# Patient Record
Sex: MALE | Race: OTHER | NOT HISPANIC OR LATINO | URBAN - METROPOLITAN AREA
[De-identification: names, ages, dates, MRNs, and addresses within clinical notes are randomized per-mention and may not be internally consistent; named-entity substitution may affect disease eponyms.]

---

## 2017-04-29 ENCOUNTER — EMERGENCY (EMERGENCY)
Facility: HOSPITAL | Age: 37
LOS: 1 days | Discharge: HOME | End: 2017-04-29
Attending: EMERGENCY MEDICINE | Admitting: EMERGENCY MEDICINE
Payer: MEDICAID

## 2017-04-29 DIAGNOSIS — R53.1 WEAKNESS: ICD-10-CM

## 2017-04-29 PROCEDURE — 93010 ELECTROCARDIOGRAM REPORT: CPT

## 2017-04-29 PROCEDURE — 99284 EMERGENCY DEPT VISIT MOD MDM: CPT | Mod: 25

## 2017-04-29 NOTE — ED ADULT NURSE NOTE - OBJECTIVE STATEMENT
Presents c/o "not feeling like myself", weakness and insomnia x3 months. Was seen by his PMD 2 weeks and told that he has a hyperactive thyroid. Was referred to psychiatry and started on Wellbutrin and Xanax. Pt was to follow with PMD in 2 weeks, but states that he had worsening fatigue recently. Pt A&Ox3. BUENO. Ambulates. Denies SI/HI. Denies CP/SOB. Respirations even/unlabored. Abd soft. Skin WDI.

## 2017-04-30 VITALS
RESPIRATION RATE: 18 BRPM | HEART RATE: 62 BPM | DIASTOLIC BLOOD PRESSURE: 85 MMHG | SYSTOLIC BLOOD PRESSURE: 130 MMHG | OXYGEN SATURATION: 96 %

## 2017-04-30 VITALS
OXYGEN SATURATION: 99 % | DIASTOLIC BLOOD PRESSURE: 84 MMHG | RESPIRATION RATE: 18 BRPM | HEART RATE: 62 BPM | TEMPERATURE: 98 F | SYSTOLIC BLOOD PRESSURE: 127 MMHG

## 2017-04-30 LAB
ALBUMIN SERPL ELPH-MCNC: 4.6 G/DL — SIGNIFICANT CHANGE UP (ref 3.3–5)
ALP SERPL-CCNC: 55 U/L — SIGNIFICANT CHANGE UP (ref 40–120)
ALT FLD-CCNC: 131 U/L RC — HIGH (ref 10–45)
ANION GAP SERPL CALC-SCNC: 14 MMOL/L — SIGNIFICANT CHANGE UP (ref 5–17)
AST SERPL-CCNC: 33 U/L — SIGNIFICANT CHANGE UP (ref 10–40)
BASOPHILS # BLD AUTO: 0.1 K/UL — SIGNIFICANT CHANGE UP (ref 0–0.2)
BASOPHILS NFR BLD AUTO: 0.5 % — SIGNIFICANT CHANGE UP (ref 0–2)
BILIRUB SERPL-MCNC: 0.3 MG/DL — SIGNIFICANT CHANGE UP (ref 0.2–1.2)
BUN SERPL-MCNC: 14 MG/DL — SIGNIFICANT CHANGE UP (ref 7–23)
CALCIUM SERPL-MCNC: 9.4 MG/DL — SIGNIFICANT CHANGE UP (ref 8.4–10.5)
CHLORIDE SERPL-SCNC: 103 MMOL/L — SIGNIFICANT CHANGE UP (ref 96–108)
CO2 SERPL-SCNC: 25 MMOL/L — SIGNIFICANT CHANGE UP (ref 22–31)
CREAT SERPL-MCNC: 0.92 MG/DL — SIGNIFICANT CHANGE UP (ref 0.5–1.3)
EOSINOPHIL # BLD AUTO: 0.1 K/UL — SIGNIFICANT CHANGE UP (ref 0–0.5)
EOSINOPHIL NFR BLD AUTO: 1 % — SIGNIFICANT CHANGE UP (ref 0–6)
GLUCOSE SERPL-MCNC: 93 MG/DL — SIGNIFICANT CHANGE UP (ref 70–99)
HCT VFR BLD CALC: 44.4 % — SIGNIFICANT CHANGE UP (ref 39–50)
HGB BLD-MCNC: 15.4 G/DL — SIGNIFICANT CHANGE UP (ref 13–17)
LYMPHOCYTES # BLD AUTO: 2.8 K/UL — SIGNIFICANT CHANGE UP (ref 1–3.3)
LYMPHOCYTES # BLD AUTO: 24.3 % — SIGNIFICANT CHANGE UP (ref 13–44)
MCHC RBC-ENTMCNC: 30.1 PG — SIGNIFICANT CHANGE UP (ref 27–34)
MCHC RBC-ENTMCNC: 34.6 GM/DL — SIGNIFICANT CHANGE UP (ref 32–36)
MCV RBC AUTO: 86.9 FL — SIGNIFICANT CHANGE UP (ref 80–100)
MONOCYTES # BLD AUTO: 0.9 K/UL — SIGNIFICANT CHANGE UP (ref 0–0.9)
MONOCYTES NFR BLD AUTO: 7.7 % — SIGNIFICANT CHANGE UP (ref 2–14)
NEUTROPHILS # BLD AUTO: 7.6 K/UL — HIGH (ref 1.8–7.4)
NEUTROPHILS NFR BLD AUTO: 66.5 % — SIGNIFICANT CHANGE UP (ref 43–77)
PLATELET # BLD AUTO: 285 K/UL — SIGNIFICANT CHANGE UP (ref 150–400)
POTASSIUM SERPL-MCNC: 3.6 MMOL/L — SIGNIFICANT CHANGE UP (ref 3.5–5.3)
POTASSIUM SERPL-SCNC: 3.6 MMOL/L — SIGNIFICANT CHANGE UP (ref 3.5–5.3)
PROT SERPL-MCNC: 7.3 G/DL — SIGNIFICANT CHANGE UP (ref 6–8.3)
RBC # BLD: 5.11 M/UL — SIGNIFICANT CHANGE UP (ref 4.2–5.8)
RBC # FLD: 11.8 % — SIGNIFICANT CHANGE UP (ref 10.3–14.5)
SODIUM SERPL-SCNC: 142 MMOL/L — SIGNIFICANT CHANGE UP (ref 135–145)
TSH SERPL-MCNC: 0.72 UIU/ML — SIGNIFICANT CHANGE UP (ref 0.27–4.2)
WBC # BLD: 11.4 K/UL — HIGH (ref 3.8–10.5)
WBC # FLD AUTO: 11.4 K/UL — HIGH (ref 3.8–10.5)

## 2017-04-30 PROCEDURE — 93005 ELECTROCARDIOGRAM TRACING: CPT

## 2017-04-30 PROCEDURE — 84443 ASSAY THYROID STIM HORMONE: CPT

## 2017-04-30 PROCEDURE — 80053 COMPREHEN METABOLIC PANEL: CPT

## 2017-04-30 PROCEDURE — 99284 EMERGENCY DEPT VISIT MOD MDM: CPT | Mod: 25

## 2017-04-30 PROCEDURE — 85027 COMPLETE CBC AUTOMATED: CPT

## 2017-04-30 RX ORDER — SODIUM CHLORIDE 9 MG/ML
1000 INJECTION, SOLUTION INTRAVENOUS ONCE
Qty: 0 | Refills: 0 | Status: COMPLETED | OUTPATIENT
Start: 2017-04-30 | End: 2017-04-30

## 2017-04-30 RX ORDER — ALPRAZOLAM 0.25 MG
0.5 TABLET ORAL ONCE
Qty: 0 | Refills: 0 | Status: DISCONTINUED | OUTPATIENT
Start: 2017-04-30 | End: 2017-04-30

## 2017-04-30 RX ADMIN — Medication 0.5 MILLIGRAM(S): at 01:41

## 2017-04-30 RX ADMIN — SODIUM CHLORIDE 1000 MILLILITER(S): 9 INJECTION, SOLUTION INTRAVENOUS at 00:58

## 2017-04-30 NOTE — ED PROVIDER NOTE - OBJECTIVE STATEMENT
38 yo [place] M PMHx Anxiety (Wellbutrin & Xanax) and recently diagnosed "borderline hyperthyroidism" presents with 3 month history of generalized fatigue and weakness. Per patient, he is a  and normally can work 50-60hrs/week with no issue, until 3 months ago when he has been unable to work for 5 hrs at a time. Endorses +bilateral LE weakness +Dizziness, +15lbs weight loss +perspirations. Only sleeps roughly 2-3 hours a night. Patient saw PCP 2-3 weeks ago and received routine blood work and evaluation and was told he is borderline hyperthyroid, but not prescribed any medication and told to continue to take his Wellbutrin/Xanax and improved sleep hygiene.   Non-smoker, no illicit drug use, has abstained from EtOH for 3 weeks but had drank 2-3 beers/d prior

## 2017-04-30 NOTE — ED PROVIDER NOTE - CARE PLAN
Principal Discharge DX:	Fatigue Principal Discharge DX:	Fatigue  Goal:	Resolution  Instructions for follow-up, activity and diet:	Please follow-up with the medicine clinic within 24-48 hrs ([phone])  Please use Melatonin to help sleep, refrain from using alcohol, drink coffee/stimulants sparingly, do not eat, drink or watch TV prior to bed, use your bed for sleep and sex only.  Please return to the ED for new/worsening symptoms Principal Discharge DX:	Fatigue  Goal:	Resolution  Instructions for follow-up, activity and diet:	Please follow-up with the medicine clinic within 24-48 hrs ([phone])  Please use Melatonin to help sleep, refrain from using alcohol, drink coffee/stimulants sparingly, do not eat, drink or watch TV prior to bed, use your bed for sleep and sex only.  Please return to the ED for new/worsening symptoms

## 2017-04-30 NOTE — ED PROVIDER NOTE - PLAN OF CARE
Resolution Please follow-up with the medicine clinic within 24-48 hrs ([phone])  Please use Melatonin to help sleep, refrain from using alcohol, drink coffee/stimulants sparingly, do not eat, drink or watch TV prior to bed, use your bed for sleep and sex only.  Please return to the ED for new/worsening symptoms

## 2017-04-30 NOTE — ED PROVIDER NOTE - CONSTITUTIONAL, MLM
normal... Tired appearing, well nourished, awake, alert, oriented to person, place, time/situation and in no apparent distress.

## 2017-04-30 NOTE — ED PROVIDER NOTE - ATTENDING CONTRIBUTION TO CARE
patient presenting with generalized fatigue. poor sleep hygiene. 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 HD stable. EKG NSR. no fever or chills. will check blood work and out patient f/u.

## 2017-11-05 ENCOUNTER — EMERGENCY (EMERGENCY)
Facility: HOSPITAL | Age: 37
LOS: 1 days | Discharge: HOME | End: 2017-11-05
Attending: EMERGENCY MEDICINE | Admitting: EMERGENCY MEDICINE
Payer: COMMERCIAL

## 2017-11-05 VITALS — OXYGEN SATURATION: 95 % | HEART RATE: 90 BPM | RESPIRATION RATE: 19 BRPM | TEMPERATURE: 101 F

## 2017-11-05 VITALS
RESPIRATION RATE: 18 BRPM | OXYGEN SATURATION: 90 % | TEMPERATURE: 100 F | HEART RATE: 103 BPM | SYSTOLIC BLOOD PRESSURE: 143 MMHG | DIASTOLIC BLOOD PRESSURE: 83 MMHG

## 2017-11-05 LAB
ALBUMIN SERPL ELPH-MCNC: 4.3 G/DL — SIGNIFICANT CHANGE UP (ref 3.3–5)
ALP SERPL-CCNC: 55 U/L — SIGNIFICANT CHANGE UP (ref 40–120)
ALT FLD-CCNC: 100 U/L RC — HIGH (ref 10–45)
ANION GAP SERPL CALC-SCNC: 15 MMOL/L — SIGNIFICANT CHANGE UP (ref 5–17)
AST SERPL-CCNC: 101 U/L — HIGH (ref 10–40)
BILIRUB SERPL-MCNC: 0.8 MG/DL — SIGNIFICANT CHANGE UP (ref 0.2–1.2)
BUN SERPL-MCNC: 23 MG/DL — SIGNIFICANT CHANGE UP (ref 7–23)
CALCIUM SERPL-MCNC: 8.7 MG/DL — SIGNIFICANT CHANGE UP (ref 8.4–10.5)
CHLORIDE SERPL-SCNC: 101 MMOL/L — SIGNIFICANT CHANGE UP (ref 96–108)
CO2 SERPL-SCNC: 22 MMOL/L — SIGNIFICANT CHANGE UP (ref 22–31)
CREAT SERPL-MCNC: 1.02 MG/DL — SIGNIFICANT CHANGE UP (ref 0.5–1.3)
EOSINOPHIL # BLD AUTO: 0.1 K/UL — SIGNIFICANT CHANGE UP (ref 0–0.5)
EOSINOPHIL NFR BLD AUTO: 1 % — SIGNIFICANT CHANGE UP (ref 0–6)
GAS PNL BLDV: SIGNIFICANT CHANGE UP
GLUCOSE SERPL-MCNC: 147 MG/DL — HIGH (ref 70–99)
HCT VFR BLD CALC: 40.5 % — SIGNIFICANT CHANGE UP (ref 39–50)
HGB BLD-MCNC: 13.8 G/DL — SIGNIFICANT CHANGE UP (ref 13–17)
LYMPHOCYTES # BLD AUTO: 0.9 K/UL — LOW (ref 1–3.3)
LYMPHOCYTES # BLD AUTO: 6 % — LOW (ref 13–44)
MCHC RBC-ENTMCNC: 30.3 PG — SIGNIFICANT CHANGE UP (ref 27–34)
MCHC RBC-ENTMCNC: 34.1 GM/DL — SIGNIFICANT CHANGE UP (ref 32–36)
MCV RBC AUTO: 88.8 FL — SIGNIFICANT CHANGE UP (ref 80–100)
MONOCYTES # BLD AUTO: 1 K/UL — HIGH (ref 0–0.9)
MONOCYTES NFR BLD AUTO: 10 % — SIGNIFICANT CHANGE UP (ref 2–14)
NEUTROPHILS # BLD AUTO: 11.7 K/UL — HIGH (ref 1.8–7.4)
NEUTROPHILS NFR BLD AUTO: 72 % — SIGNIFICANT CHANGE UP (ref 43–77)
PLATELET # BLD AUTO: 238 K/UL — SIGNIFICANT CHANGE UP (ref 150–400)
POTASSIUM SERPL-MCNC: 3.7 MMOL/L — SIGNIFICANT CHANGE UP (ref 3.5–5.3)
POTASSIUM SERPL-SCNC: 3.7 MMOL/L — SIGNIFICANT CHANGE UP (ref 3.5–5.3)
PROT SERPL-MCNC: 6.6 G/DL — SIGNIFICANT CHANGE UP (ref 6–8.3)
RBC # BLD: 4.56 M/UL — SIGNIFICANT CHANGE UP (ref 4.2–5.8)
RBC # FLD: 11.5 % — SIGNIFICANT CHANGE UP (ref 10.3–14.5)
SODIUM SERPL-SCNC: 138 MMOL/L — SIGNIFICANT CHANGE UP (ref 135–145)
WBC # BLD: 13.7 K/UL — HIGH (ref 3.8–10.5)
WBC # FLD AUTO: 13.7 K/UL — HIGH (ref 3.8–10.5)

## 2017-11-05 PROCEDURE — 84132 ASSAY OF SERUM POTASSIUM: CPT

## 2017-11-05 PROCEDURE — 96374 THER/PROPH/DIAG INJ IV PUSH: CPT

## 2017-11-05 PROCEDURE — 99284 EMERGENCY DEPT VISIT MOD MDM: CPT | Mod: 25

## 2017-11-05 PROCEDURE — 85014 HEMATOCRIT: CPT

## 2017-11-05 PROCEDURE — 84295 ASSAY OF SERUM SODIUM: CPT

## 2017-11-05 PROCEDURE — 80053 COMPREHEN METABOLIC PANEL: CPT

## 2017-11-05 PROCEDURE — 99285 EMERGENCY DEPT VISIT HI MDM: CPT | Mod: 25

## 2017-11-05 PROCEDURE — 93005 ELECTROCARDIOGRAM TRACING: CPT

## 2017-11-05 PROCEDURE — 76705 ECHO EXAM OF ABDOMEN: CPT

## 2017-11-05 PROCEDURE — 71046 X-RAY EXAM CHEST 2 VIEWS: CPT

## 2017-11-05 PROCEDURE — 82947 ASSAY GLUCOSE BLOOD QUANT: CPT

## 2017-11-05 PROCEDURE — 82330 ASSAY OF CALCIUM: CPT

## 2017-11-05 PROCEDURE — 76705 ECHO EXAM OF ABDOMEN: CPT | Mod: 26,RT

## 2017-11-05 PROCEDURE — 82803 BLOOD GASES ANY COMBINATION: CPT

## 2017-11-05 PROCEDURE — 82435 ASSAY OF BLOOD CHLORIDE: CPT

## 2017-11-05 PROCEDURE — 85027 COMPLETE CBC AUTOMATED: CPT

## 2017-11-05 PROCEDURE — 71020: CPT | Mod: 26

## 2017-11-05 PROCEDURE — 93010 ELECTROCARDIOGRAM REPORT: CPT

## 2017-11-05 PROCEDURE — 83605 ASSAY OF LACTIC ACID: CPT

## 2017-11-05 RX ORDER — KETOROLAC TROMETHAMINE 30 MG/ML
15 SYRINGE (ML) INJECTION ONCE
Qty: 0 | Refills: 0 | Status: DISCONTINUED | OUTPATIENT
Start: 2017-11-05 | End: 2017-11-05

## 2017-11-05 RX ORDER — SODIUM CHLORIDE 9 MG/ML
2000 INJECTION, SOLUTION INTRAVENOUS ONCE
Qty: 0 | Refills: 0 | Status: COMPLETED | OUTPATIENT
Start: 2017-11-05 | End: 2017-11-05

## 2017-11-05 RX ADMIN — Medication 15 MILLIGRAM(S): at 17:11

## 2017-11-05 RX ADMIN — Medication 15 MILLIGRAM(S): at 16:23

## 2017-11-05 RX ADMIN — SODIUM CHLORIDE 2000 MILLILITER(S): 9 INJECTION, SOLUTION INTRAVENOUS at 16:23

## 2017-11-05 NOTE — ED PROVIDER NOTE - PROGRESS NOTE DETAILS
Pt has pna, is hypoxic offered admission, pt now saturating 94% on RA.  The patient is leaving against medical advice. The patient has the capacity to refuse further medical evaluation and care. I had a lengthy discussion with the patient in which appropriate further evaluation and treatment was offered to the patient, and they declined. The patient understands that as a consequence of this decision they may be at risk for clinical deterioration including permanent disability and death and verbalized this understanding. Clear return precautions were discussed. The patient was urged to seek follow-up care, with appropriate referrals made as needed.  Pt refused admission.

## 2017-11-05 NOTE — ED PROVIDER NOTE - OBJECTIVE STATEMENT
37 YOM presents to ed with fever, weakness, cough x 2 days. Pt had near syncope x 2 times today while laying in bed. +productive cough with brown phlegm. Pt denies any abd pain +nausea no vomiting. Pt has diffuse body aches. 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

## 2017-11-05 NOTE — ED PROVIDER NOTE - NS ED ROS FT
CONSTITUTIONAL: + fevers, no chills  Eyes: no visual changes  Ears: no ear drainage, no ear pain  Nose: no nasal congestion  Mouth/Throat: no sore throat  Cardiovascular: No Chest pain  Respiratory: + SOB +Cough  Gastrointestinal: No n/v/d, no abd pain  Genitourinary: no dysuria, no hematuria  SKIN: no rashes.  NEURO: +headache   PSYCHIATRIC: no known mental health issues.

## 2017-11-05 NOTE — ED ADULT NURSE NOTE - CHIEF COMPLAINT QUOTE
fever/cough x "few days"; sent from urgent care; O2 sat in triage 90%, pt placed on 3L NC O2 sat increased to 95%

## 2017-11-05 NOTE — ED ADULT NURSE REASSESSMENT NOTE - NS ED NURSE REASSESS COMMENT FT1
patient did not want to stay. risks of going home against medical advice were explained by md. patient declined to stay. importance of follow up with primary md was explained. patient states he would make an appointment tomorrow with pmd.

## 2017-11-05 NOTE — ED PROVIDER NOTE - MEDICAL DECISION MAKING DETAILS
Rhonchi, fever, weakness, right upper quadrant abdominal pain. Will get CXR, right upper quadrant US, labs, reassess likely admit.

## 2017-11-05 NOTE — ED PROVIDER NOTE - ATTENDING CONTRIBUTION TO CARE
36 yo male, fever, cough, PNA on CXR (community-acquired).  Extensive conversation with patient and wife re: I believe he should stay for IV antibiotics given mild hypoxia and SOB at home.  Patient adamantly refuses, states he has been in the hospital before and there were "complications", will not go into detail, but states he will unequivocally not stay.  See AMA form.  Says he will call PCP tomorrow.  Will send with rx for abx.

## 2017-11-05 NOTE — ED ADULT NURSE NOTE - OBJECTIVE STATEMENT
37 y m came to the ed from urgent care with facial redness and a fever. patient states he never felt like he had a fever but noticed having redness around his eyes and some chest congestion. also c/o of ruq pain. patient has a dry cough. denies any chills, n/v/d. skin is warm and dry. denies any body aches. patient is a/ox3.

## 2017-11-06 ENCOUNTER — INPATIENT (INPATIENT)
Facility: HOSPITAL | Age: 37
LOS: 3 days | Discharge: HOME | DRG: 871 | End: 2017-11-09
Attending: GENERAL ACUTE CARE HOSPITAL | Admitting: GENERAL ACUTE CARE HOSPITAL
Payer: COMMERCIAL

## 2017-11-06 VITALS
DIASTOLIC BLOOD PRESSURE: 76 MMHG | RESPIRATION RATE: 18 BRPM | OXYGEN SATURATION: 89 % | SYSTOLIC BLOOD PRESSURE: 130 MMHG | HEART RATE: 96 BPM | TEMPERATURE: 98 F

## 2017-11-06 DIAGNOSIS — J18.9 PNEUMONIA, UNSPECIFIED ORGANISM: ICD-10-CM

## 2017-11-06 DIAGNOSIS — R55 SYNCOPE AND COLLAPSE: ICD-10-CM

## 2017-11-06 DIAGNOSIS — R74.0 NONSPECIFIC ELEVATION OF LEVELS OF TRANSAMINASE AND LACTIC ACID DEHYDROGENASE [LDH]: ICD-10-CM

## 2017-11-06 DIAGNOSIS — A41.9 SEPSIS, UNSPECIFIED ORGANISM: ICD-10-CM

## 2017-11-06 DIAGNOSIS — Z98.890 OTHER SPECIFIED POSTPROCEDURAL STATES: Chronic | ICD-10-CM

## 2017-11-06 LAB
ALBUMIN SERPL ELPH-MCNC: 3.8 G/DL — SIGNIFICANT CHANGE UP (ref 3.3–5)
ALP SERPL-CCNC: 50 U/L — SIGNIFICANT CHANGE UP (ref 40–120)
ALT FLD-CCNC: 65 U/L RC — HIGH (ref 10–45)
AMPHET UR-MCNC: NEGATIVE — SIGNIFICANT CHANGE UP
ANION GAP SERPL CALC-SCNC: 9 MMOL/L — SIGNIFICANT CHANGE UP (ref 5–17)
APPEARANCE UR: ABNORMAL
APTT BLD: 30.7 SEC — SIGNIFICANT CHANGE UP (ref 27.5–37.4)
AST SERPL-CCNC: 38 U/L — SIGNIFICANT CHANGE UP (ref 10–40)
BARBITURATES UR SCN-MCNC: NEGATIVE — SIGNIFICANT CHANGE UP
BASOPHILS # BLD AUTO: 0 K/UL — SIGNIFICANT CHANGE UP (ref 0–0.2)
BASOPHILS NFR BLD AUTO: 0.2 % — SIGNIFICANT CHANGE UP (ref 0–2)
BENZODIAZ UR-MCNC: NEGATIVE — SIGNIFICANT CHANGE UP
BILIRUB SERPL-MCNC: 0.4 MG/DL — SIGNIFICANT CHANGE UP (ref 0.2–1.2)
BILIRUB UR-MCNC: NEGATIVE — SIGNIFICANT CHANGE UP
BUN SERPL-MCNC: 21 MG/DL — SIGNIFICANT CHANGE UP (ref 7–23)
CALCIUM SERPL-MCNC: 8.1 MG/DL — LOW (ref 8.4–10.5)
CHLORIDE SERPL-SCNC: 102 MMOL/L — SIGNIFICANT CHANGE UP (ref 96–108)
CK MB BLD-MCNC: 0.8 % — SIGNIFICANT CHANGE UP (ref 0–3.5)
CK MB BLD-MCNC: 0.9 % — SIGNIFICANT CHANGE UP (ref 0–3.5)
CK MB CFR SERPL CALC: 2.1 NG/ML — SIGNIFICANT CHANGE UP (ref 0–6.7)
CK MB CFR SERPL CALC: 2.2 NG/ML — SIGNIFICANT CHANGE UP (ref 0–6.7)
CK SERPL-CCNC: 237 U/L — HIGH (ref 30–200)
CK SERPL-CCNC: 262 U/L — HIGH (ref 30–200)
CO2 SERPL-SCNC: 25 MMOL/L — SIGNIFICANT CHANGE UP (ref 22–31)
COCAINE METAB.OTHER UR-MCNC: NEGATIVE — SIGNIFICANT CHANGE UP
COLOR SPEC: YELLOW — SIGNIFICANT CHANGE UP
CREAT SERPL-MCNC: 0.95 MG/DL — SIGNIFICANT CHANGE UP (ref 0.5–1.3)
D DIMER BLD IA.RAPID-MCNC: 540 NG/ML DDU — HIGH
DIFF PNL FLD: NEGATIVE — SIGNIFICANT CHANGE UP
EOSINOPHIL # BLD AUTO: 0.2 K/UL — SIGNIFICANT CHANGE UP (ref 0–0.5)
EOSINOPHIL NFR BLD AUTO: 1.8 % — SIGNIFICANT CHANGE UP (ref 0–6)
FERRITIN SERPL-MCNC: 97 NG/ML — SIGNIFICANT CHANGE UP (ref 30–400)
GAS PNL BLDA: SIGNIFICANT CHANGE UP
GAS PNL BLDV: SIGNIFICANT CHANGE UP
GLUCOSE SERPL-MCNC: 155 MG/DL — HIGH (ref 70–99)
GLUCOSE UR QL: NEGATIVE — SIGNIFICANT CHANGE UP
GRAM STN FLD: SIGNIFICANT CHANGE UP
HCT VFR BLD CALC: 32.7 % — LOW (ref 39–50)
HGB BLD-MCNC: 11.3 G/DL — LOW (ref 13–17)
INR BLD: 1.19 RATIO — HIGH (ref 0.88–1.16)
KETONES UR-MCNC: ABNORMAL
LEUKOCYTE ESTERASE UR-ACNC: NEGATIVE — SIGNIFICANT CHANGE UP
LYMPHOCYTES # BLD AUTO: 1.6 K/UL — SIGNIFICANT CHANGE UP (ref 1–3.3)
LYMPHOCYTES # BLD AUTO: 12.9 % — LOW (ref 13–44)
MAGNESIUM SERPL-MCNC: 2 MG/DL — SIGNIFICANT CHANGE UP (ref 1.6–2.6)
MCHC RBC-ENTMCNC: 31.1 PG — SIGNIFICANT CHANGE UP (ref 27–34)
MCHC RBC-ENTMCNC: 34.6 GM/DL — SIGNIFICANT CHANGE UP (ref 32–36)
MCV RBC AUTO: 89.9 FL — SIGNIFICANT CHANGE UP (ref 80–100)
METHADONE UR-MCNC: NEGATIVE — SIGNIFICANT CHANGE UP
MONOCYTES # BLD AUTO: 0.8 K/UL — SIGNIFICANT CHANGE UP (ref 0–0.9)
MONOCYTES NFR BLD AUTO: 6.3 % — SIGNIFICANT CHANGE UP (ref 2–14)
MYOGLOBIN SERPL-MCNC: 29 NG/ML — SIGNIFICANT CHANGE UP (ref 16–96)
NEUTROPHILS # BLD AUTO: 9.9 K/UL — HIGH (ref 1.8–7.4)
NEUTROPHILS NFR BLD AUTO: 78.7 % — HIGH (ref 43–77)
NITRITE UR-MCNC: NEGATIVE — SIGNIFICANT CHANGE UP
NT-PROBNP SERPL-SCNC: 356 PG/ML — HIGH (ref 0–300)
OPIATES UR-MCNC: NEGATIVE — SIGNIFICANT CHANGE UP
OXYCODONE UR-MCNC: POSITIVE
PCP SPEC-MCNC: SIGNIFICANT CHANGE UP
PCP UR-MCNC: NEGATIVE — SIGNIFICANT CHANGE UP
PH UR: 5.5 — SIGNIFICANT CHANGE UP (ref 5–8)
PHOSPHATE SERPL-MCNC: 1.8 MG/DL — LOW (ref 2.5–4.5)
PLATELET # BLD AUTO: 183 K/UL — SIGNIFICANT CHANGE UP (ref 150–400)
POTASSIUM SERPL-MCNC: 3.5 MMOL/L — SIGNIFICANT CHANGE UP (ref 3.5–5.3)
POTASSIUM SERPL-SCNC: 3.5 MMOL/L — SIGNIFICANT CHANGE UP (ref 3.5–5.3)
PROT SERPL-MCNC: 5.8 G/DL — LOW (ref 6–8.3)
PROT UR-MCNC: 30 MG/DL
PROTHROM AB SERPL-ACNC: 13 SEC — HIGH (ref 9.8–12.7)
RAPID RVP RESULT: SIGNIFICANT CHANGE UP
RBC # BLD: 3.64 M/UL — LOW (ref 4.2–5.8)
RBC # FLD: 11.6 % — SIGNIFICANT CHANGE UP (ref 10.3–14.5)
SODIUM SERPL-SCNC: 136 MMOL/L — SIGNIFICANT CHANGE UP (ref 135–145)
SP GR SPEC: >1.03 — HIGH (ref 1.01–1.02)
SPECIMEN SOURCE: SIGNIFICANT CHANGE UP
THC UR QL: NEGATIVE — SIGNIFICANT CHANGE UP
TROPONIN T SERPL-MCNC: <0.01 NG/ML — SIGNIFICANT CHANGE UP (ref 0–0.06)
TROPONIN T SERPL-MCNC: <0.01 NG/ML — SIGNIFICANT CHANGE UP (ref 0–0.06)
UROBILINOGEN FLD QL: NEGATIVE — SIGNIFICANT CHANGE UP
WBC # BLD: 12.6 K/UL — HIGH (ref 3.8–10.5)
WBC # FLD AUTO: 12.6 K/UL — HIGH (ref 3.8–10.5)
WBC UR QL: SIGNIFICANT CHANGE UP /HPF (ref 0–5)

## 2017-11-06 PROCEDURE — 71250 CT THORAX DX C-: CPT | Mod: 26

## 2017-11-06 PROCEDURE — 99223 1ST HOSP IP/OBS HIGH 75: CPT | Mod: GC

## 2017-11-06 PROCEDURE — 99223 1ST HOSP IP/OBS HIGH 75: CPT

## 2017-11-06 PROCEDURE — 99285 EMERGENCY DEPT VISIT HI MDM: CPT | Mod: 25

## 2017-11-06 RX ORDER — SENNA PLUS 8.6 MG/1
2 TABLET ORAL AT BEDTIME
Qty: 0 | Refills: 0 | Status: DISCONTINUED | OUTPATIENT
Start: 2017-11-06 | End: 2017-11-09

## 2017-11-06 RX ORDER — ACETAMINOPHEN 500 MG
650 TABLET ORAL EVERY 6 HOURS
Qty: 0 | Refills: 0 | Status: DISCONTINUED | OUTPATIENT
Start: 2017-11-06 | End: 2017-11-06

## 2017-11-06 RX ORDER — OXYCODONE AND ACETAMINOPHEN 5; 325 MG/1; MG/1
1 TABLET ORAL EVERY 8 HOURS
Qty: 0 | Refills: 0 | Status: DISCONTINUED | OUTPATIENT
Start: 2017-11-06 | End: 2017-11-09

## 2017-11-06 RX ORDER — IPRATROPIUM/ALBUTEROL SULFATE 18-103MCG
3 AEROSOL WITH ADAPTER (GRAM) INHALATION EVERY 6 HOURS
Qty: 0 | Refills: 0 | Status: DISCONTINUED | OUTPATIENT
Start: 2017-11-06 | End: 2017-11-06

## 2017-11-06 RX ORDER — DOCUSATE SODIUM 100 MG
100 CAPSULE ORAL THREE TIMES A DAY
Qty: 0 | Refills: 0 | Status: DISCONTINUED | OUTPATIENT
Start: 2017-11-06 | End: 2017-11-09

## 2017-11-06 RX ORDER — AZITHROMYCIN 500 MG/1
500 TABLET, FILM COATED ORAL ONCE
Qty: 0 | Refills: 0 | Status: COMPLETED | OUTPATIENT
Start: 2017-11-06 | End: 2017-11-06

## 2017-11-06 RX ORDER — IPRATROPIUM/ALBUTEROL SULFATE 18-103MCG
3 AEROSOL WITH ADAPTER (GRAM) INHALATION EVERY 6 HOURS
Qty: 0 | Refills: 0 | Status: DISCONTINUED | OUTPATIENT
Start: 2017-11-06 | End: 2017-11-09

## 2017-11-06 RX ORDER — DULOXETINE HYDROCHLORIDE 30 MG/1
0 CAPSULE, DELAYED RELEASE ORAL
Qty: 0 | Refills: 0 | COMMUNITY

## 2017-11-06 RX ORDER — ACETAMINOPHEN 500 MG
650 TABLET ORAL ONCE
Qty: 0 | Refills: 0 | Status: COMPLETED | OUTPATIENT
Start: 2017-11-06 | End: 2017-11-06

## 2017-11-06 RX ORDER — ALPRAZOLAM 0.25 MG
0 TABLET ORAL
Qty: 0 | Refills: 0 | COMMUNITY

## 2017-11-06 RX ORDER — MORPHINE SULFATE 50 MG/1
1 CAPSULE, EXTENDED RELEASE ORAL ONCE
Qty: 0 | Refills: 0 | Status: DISCONTINUED | OUTPATIENT
Start: 2017-11-06 | End: 2017-11-06

## 2017-11-06 RX ORDER — CEFTRIAXONE 500 MG/1
1 INJECTION, POWDER, FOR SOLUTION INTRAMUSCULAR; INTRAVENOUS ONCE
Qty: 0 | Refills: 0 | Status: COMPLETED | OUTPATIENT
Start: 2017-11-06 | End: 2017-11-06

## 2017-11-06 RX ORDER — CEFTRIAXONE 500 MG/1
1 INJECTION, POWDER, FOR SOLUTION INTRAMUSCULAR; INTRAVENOUS EVERY 24 HOURS
Qty: 0 | Refills: 0 | Status: DISCONTINUED | OUTPATIENT
Start: 2017-11-07 | End: 2017-11-09

## 2017-11-06 RX ORDER — CEFTRIAXONE 500 MG/1
INJECTION, POWDER, FOR SOLUTION INTRAMUSCULAR; INTRAVENOUS
Qty: 0 | Refills: 0 | Status: DISCONTINUED | OUTPATIENT
Start: 2017-11-06 | End: 2017-11-09

## 2017-11-06 RX ORDER — AZITHROMYCIN 500 MG/1
TABLET, FILM COATED ORAL
Qty: 0 | Refills: 0 | Status: DISCONTINUED | OUTPATIENT
Start: 2017-11-06 | End: 2017-11-09

## 2017-11-06 RX ORDER — MELOXICAM 15 MG/1
0 TABLET ORAL
Qty: 0 | Refills: 0 | COMMUNITY

## 2017-11-06 RX ORDER — BUPROPION HYDROCHLORIDE 150 MG/1
0 TABLET, EXTENDED RELEASE ORAL
Qty: 0 | Refills: 0 | COMMUNITY

## 2017-11-06 RX ORDER — IPRATROPIUM/ALBUTEROL SULFATE 18-103MCG
3 AEROSOL WITH ADAPTER (GRAM) INHALATION ONCE
Qty: 0 | Refills: 0 | Status: COMPLETED | OUTPATIENT
Start: 2017-11-06 | End: 2017-11-06

## 2017-11-06 RX ORDER — SODIUM CHLORIDE 9 MG/ML
1000 INJECTION INTRAMUSCULAR; INTRAVENOUS; SUBCUTANEOUS
Qty: 0 | Refills: 0 | Status: DISCONTINUED | OUTPATIENT
Start: 2017-11-06 | End: 2017-11-06

## 2017-11-06 RX ORDER — AZITHROMYCIN 500 MG/1
500 TABLET, FILM COATED ORAL EVERY 24 HOURS
Qty: 0 | Refills: 0 | Status: DISCONTINUED | OUTPATIENT
Start: 2017-11-07 | End: 2017-11-09

## 2017-11-06 RX ORDER — GABAPENTIN 400 MG/1
300 CAPSULE ORAL AT BEDTIME
Qty: 0 | Refills: 0 | Status: DISCONTINUED | OUTPATIENT
Start: 2017-11-06 | End: 2017-11-09

## 2017-11-06 RX ORDER — SODIUM CHLORIDE 9 MG/ML
1000 INJECTION INTRAMUSCULAR; INTRAVENOUS; SUBCUTANEOUS ONCE
Qty: 0 | Refills: 0 | Status: COMPLETED | OUTPATIENT
Start: 2017-11-06 | End: 2017-11-06

## 2017-11-06 RX ADMIN — MORPHINE SULFATE 1 MILLIGRAM(S): 50 CAPSULE, EXTENDED RELEASE ORAL at 17:32

## 2017-11-06 RX ADMIN — MORPHINE SULFATE 1 MILLIGRAM(S): 50 CAPSULE, EXTENDED RELEASE ORAL at 18:42

## 2017-11-06 RX ADMIN — OXYCODONE AND ACETAMINOPHEN 1 TABLET(S): 5; 325 TABLET ORAL at 14:49

## 2017-11-06 RX ADMIN — Medication 3 MILLILITER(S): at 18:21

## 2017-11-06 RX ADMIN — SODIUM CHLORIDE 200 MILLILITER(S): 9 INJECTION INTRAMUSCULAR; INTRAVENOUS; SUBCUTANEOUS at 05:39

## 2017-11-06 RX ADMIN — Medication 3 MILLILITER(S): at 05:39

## 2017-11-06 RX ADMIN — Medication 40 MILLIGRAM(S): at 17:32

## 2017-11-06 RX ADMIN — Medication 650 MILLIGRAM(S): at 03:58

## 2017-11-06 RX ADMIN — Medication 3 MILLILITER(S): at 03:43

## 2017-11-06 RX ADMIN — Medication 3 MILLILITER(S): at 11:55

## 2017-11-06 RX ADMIN — SODIUM CHLORIDE 2000 MILLILITER(S): 9 INJECTION INTRAMUSCULAR; INTRAVENOUS; SUBCUTANEOUS at 00:45

## 2017-11-06 RX ADMIN — OXYCODONE AND ACETAMINOPHEN 1 TABLET(S): 5; 325 TABLET ORAL at 22:22

## 2017-11-06 RX ADMIN — Medication 3 MILLILITER(S): at 23:03

## 2017-11-06 RX ADMIN — CEFTRIAXONE 100 GRAM(S): 500 INJECTION, POWDER, FOR SOLUTION INTRAMUSCULAR; INTRAVENOUS at 14:41

## 2017-11-06 RX ADMIN — OXYCODONE AND ACETAMINOPHEN 1 TABLET(S): 5; 325 TABLET ORAL at 22:55

## 2017-11-06 RX ADMIN — OXYCODONE AND ACETAMINOPHEN 1 TABLET(S): 5; 325 TABLET ORAL at 15:35

## 2017-11-06 RX ADMIN — GABAPENTIN 300 MILLIGRAM(S): 400 CAPSULE ORAL at 22:22

## 2017-11-06 RX ADMIN — AZITHROMYCIN 250 MILLIGRAM(S): 500 TABLET, FILM COATED ORAL at 16:23

## 2017-11-06 NOTE — ED PROVIDER NOTE - ATTENDING CONTRIBUTION TO CARE
ATTENDING MD:  Oleg HOOKS, personally have seen and examined this patient.  I have discussed all aspects of care with the resident physician. Resident note reviewed and agree on plan of care and except where noted.  See HPI, PE, and MDM for details.     GEN: mildly ill appearing, coughing, AOx4  HEENT: mucus membranes are moist, oropharynx is within normal limits, NCAT, neck supple  CHEST: decreased breath sounds and crackles in RLL, otherwise CTA, no wheezing  CV: normal rate, regular rhythm   ABD: soft, nontender  : no CVAT  MSK: no extremity swelling or tenderness  SKIN: warm, dry    MDM: return for pneumonia, worsening symptoms, hypoxia. already s/p levofloxacin 750mg today and yesterday. will admit to medicine. given transaminitis will r/o influenza. Pt is not septic.

## 2017-11-06 NOTE — ED ADULT NURSE NOTE - OBJECTIVE STATEMENT
37y male c/o "not feeling well." A&Ox3, neuro intact. Hx of anxiety. Patient was in [facility] ED erlaier today c/o cough. N/v. Denies chest pain. 37y male c/o "not feeling well." A&Ox3, neuro intact. Hx of hyperthyroidism and anxiety. Patient was in [facility] ED earlier today c/o PNA. [facility] want to admit patient, patient sign out AMA. Given levaquin IV, sent home on levaquin PO. Patient returned feeling. N/v. Denies chest pain. 7M hyperthyroid, anxiety presents for admission for pneumonia.  Pt was seen here earlier today and was diagnosed with pneumonia but refused admission at that time.  Pt left AMA on Levaquin, but felt worse when he got home.  Pt states shortness of breath, fever/chills.  Pt originally had nausea/vomiting prior to first visit, but has not vomited since leaving hospital.  Pt took Tylenol at 9pm. 37y male c/o "not feeling well." A&Ox3, neuro intact. Hx of hyperthyroidism and anxiety. Patient was in [facility] ED earlier today dx PNA. [facility] want to admit patient, patient sign out AMA. Given levaquin IV, sent home on levaquin PO. Patient returned feeling worse. Presents SpO2 88% on room air, placed on 4L O2.  C/o nonproductive cough. Patient report nausea/vomiting earlier, denies vomit since leaving hospital. States fever at home, took Tylenol at 2100. Denies chest pain and diaphoresis. Patient appears anxious and fidgety.

## 2017-11-06 NOTE — ED PROVIDER NOTE - PHYSICAL EXAMINATION
Gen: AOx3, coughing, with O2 via NC  Head: NCAT  HEENT: PERRL, oral mucosa moist, normal conjunctiva, no throat erythema or exudates  Lung: course breath sounds, no resp distress  CV: rrr, no murmurs, Normal perfusion  Abd: soft, NTND, no CVA tenderness  MSK: No edema, no visible deformities  Neuro: No focal neurologic deficits, normal gait  Skin: abrasions on nose 2/2 mask at work  - Ronna Nina, DO

## 2017-11-06 NOTE — CONSULT NOTE ADULT - ASSESSMENT
38 yo, here with initially malaise and GI Sxs, followed by productive cough, fever, and noted multifocal pneumonia- CAP  He is at increased risk for Legionella  RVP negative, sputum GNRs on GS  I suspect bacterial etiology    Plan:  Agree with CTX + Azithro directed at CAP  Await further Micro data  Check urine Leg Ag  Follow temps and CBC/diff   D/w pt and wife at length

## 2017-11-06 NOTE — H&P ADULT - NSHPREVIEWOFSYSTEMS_GEN_ALL_CORE
REVIEW OF SYSTEMS:  CONSTITUTIONAL: +weakness. + fevers. +chills. +rigors. No weight loss. Good appetite.  EYES: No visual changes. No eye pain.  ENT: No hearing difficulty. No vertigo. No dysphagia. No Sinusitis/rhinorrhea.  NECK: No pain. No stiffness/rigidity.  CARDIAC: No chest pain. No palpitations.  RESPIRATORY: +cough. +SOB. No hemoptysis.  GASTROINTESTINAL: No abdominal pain. No nausea. +vomiting. No hematemesis. No diarrhea. +constipation. No melena. No hematochezia.  GENITOURINARY: No dysuria. No frequency. No hesitancy. No hematuria.  NEUROLOGICAL: No numbness/tingling. No focal weakness. No incontinence. No headache.  BACK: No back pain.  EXTREMITIES: No lower extremity edema. Full ROM.  SKIN: No rashes. No itching. No other lesions.  PSYCHIATRIC: No depression. +anxiety. No SI/HI.  ALLERGIC: No lip swelling. No hives.  All other review of systems is negative unless indicated above.  Unless indicated above, unable to assess ROS 2/2

## 2017-11-06 NOTE — ED ADULT NURSE REASSESSMENT NOTE - NS ED NURSE REASSESS COMMENT FT1
RVP negative. Patient admitted to medicine for PNA, RTM. Awaiting bed assignment
RVP negative. Patient given duoneb. Repeat vitals show patient febrile, tylenol PO given. Report given to holding RN. Wife at bedside

## 2017-11-06 NOTE — CONSULT NOTE ADULT - ATTENDING COMMENTS
Agree with above. Patient seen and examined. Patient with a very bad CT scan and mild dyspnea but able to lay flat on 2L NC and able to speak in full sentences  -Pneumonia - CAP - with fevers and mild hypoxia (91% on RA) - would continue Ceftriaxone/Zithromax. Check Legionella. F/u Sputum culture. Maintain O2 sat >90, Incentive spirometer and acapella.   -Fevers - followup all cultures.   -Would hold IVF - patient was receiving 200cc/hr but not hypotensive.  -Will need repeat CT scan in 6-8 weeks   -Pulmonary management as per Dr. Guy. Unclear occupational exposures.    At this point Mr. Patel is hemodynamically stable and not in respiratory distress. He does not require ICU level care at this time. Please reconsult if his clinical status changes.

## 2017-11-06 NOTE — PROGRESS NOTE ADULT - ASSESSMENT
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  No CP , Palpitations but sob upon minmal exertion which is not his usual   no personal or familial hx of clotts  no recent travel   ROS: still with fatigue, cough , sob    no GI / /Neuro /sych sx  1- : sepsis likely sec to pna however pt with sob on exertion , will check CT chest and consult pul    cont abx with levaquin , consider  D-dimer and US duplex   2- acute anemia in a young healthy male , unlcear reason .. will re check and check w/u   3- hoarseness d/w ENT  will await input   4- hypophasphate: replete and monitor     per pharmacy : pt has had xanax  0.5  , ritalin and vyvanse .. will check urine tox

## 2017-11-06 NOTE — ED PROVIDER NOTE - OBJECTIVE STATEMENT
37M hyperthyroid, anxiety presents for admission for pneumonia.  Pt was seen here earlier today and was diagnosed with pneumonia but refused admission at that time.  Pt left AMA on Levaquin, but felt worse when he got home.  Pt states shortness of breath, fever/chills.  Pt originally had nausea/vomiting prior to first visit, but has not vomited since leaving hospital.  Pt took Tylenol at 9pm.  PMD Juan Ch 37M hyperthyroid, anxiety presents for admission for pneumonia.  Pt was seen here earlier today and was diagnosed with pneumonia but refused admission at that time.  Pt left AMA on Levaquin, but felt worse when he got home.  Pt states shortness of breath, fever/chills.  Pt originally had nausea/vomiting prior to first visit, but has not vomited since leaving hospital.  Pt took Tylenol at 9pm.  Pt got levaquin in ED and took dose at home.    PMD Juan Matt

## 2017-11-06 NOTE — CONSULT NOTE ADULT - ASSESSMENT
Community aquired pneumonia with fever, L shift, leukocytosis and focal RLL consolidation    REC:    Continue abx for CAP  f/u imaging per clinical status  Check sats RA Community aquired pneumonia with fever, L shift, leukocytosis and multilobar infiltrates. Patient on NS at 200 ml /hour - possible superimposed pulm edema/capiillary leak in setting of sepsis.     REC:    Continue abx for CAP: change to ceftriaxone and azithromycin  f/u imaging per clinical status  DC IVF  Monitor oxygen sat  Legionella Ag and repeat PCR  Titrate nasal O2 to sat > 90%

## 2017-11-06 NOTE — PROGRESS NOTE ADULT - SUBJECTIVE AND OBJECTIVE BOX
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  No CP , Palpitations but sob upon minmal exertion which is not his usual   no personal or familial hx of clotts  no recent travel   ROS: still with fatigue, cough , sob    no GI / /Neuro /sych sx      Medications:   MEDICATIONS  (STANDING):  ALBUTerol/ipratropium for Nebulization 3 milliLiter(s) Nebulizer every 6 hours  docusate sodium 100 milliGRAM(s) Oral three times a day  gabapentin 300 milliGRAM(s) Oral at bedtime  levoFLOXacin IVPB 750 milliGRAM(s) IV Intermittent every 24 hours  sodium chloride 0.9%. 1000 milliLiter(s) (200 mL/Hr) IV Continuous <Continuous>    MEDICATIONS  (PRN):  senna 2 Tablet(s) Oral at bedtime PRN Constipation  Allergies  Tylenol Cold &amp; Flu Daytime (Short breath)  Intolerances        PAST MEDICAL & SURGICAL HISTORY:  Hyperthyroidism  Anxiety  H/O carpal tunnel repair      Social :    No smoking       No ETOH use            Vital Signs Last 24 Hrs  T(C): 37.1 (06 Nov 2017 08:55), Max: 38.5 (05 Nov 2017 16:13)  T(F): 98.8 (06 Nov 2017 08:55), Max: 101.3 (05 Nov 2017 16:13)  HR: 70 (06 Nov 2017 08:55) (70 - 103)  BP: 133/81 (06 Nov 2017 08:55) (125/61 - 153/78)  BP(mean): --  RR: 18 (06 Nov 2017 08:55) (18 - 20)  SpO2: 97% (06 Nov 2017 08:55) (89% - 99%)  CAPILLARY BLOOD GLUCOSE        Physical Exam:    General:  NAD but with hoarsness   HEENT:  Nonicteric, PERRLA  CV:  RRR, no murmur, no JVD  Lungs:  CTA B/L, no wheezes, rales, rhonchi  Abdomen:  Soft, non-tender, no distended  Extremities:  2+ pulses, no c/c, no edema  Skin:  Warm and dry, no rashes  Neuro:  AAOx3, non-focal, CN II-XII grossly intact  No Restraints    LABS:                        11.3   12.6  )-----------( 183      ( 06 Nov 2017 05:57 )             32.7     11-06    136  |  102  |  21  ----------------------------<  155<H>  3.5   |  25  |  0.95    Ca    8.1<L>      06 Nov 2017 05:57  Phos  1.8     11-06  Mg     2.0     11-06    TPro  5.8<L>  /  Alb  3.8  /  TBili  0.4  /  DBili  x   /  AST  38  /  ALT  65<H>  /  AlkPhos  50  11-06      Urinalysis Basic - ( 06 Nov 2017 02:34 )    Color: Yellow / Appearance: SL Turbid / SG: >1.030 / pH: x  Gluc: x / Ketone: Trace  / Bili: Negative / Urobili: Negative   Blood: x / Protein: 30 mg/dL / Nitrite: Negative   Leuk Esterase: Negative / RBC: x / WBC 6-10 /HPF   Sq Epi: x / Non Sq Epi: x / Bacteria: x        Ferritin, Serum: 97.0 ng/mL (11-06 @ 07:23)        RADIOLOGY & ADDITIONAL TESTS:    ---------------------------------------------------------------------------  I personally reviewed: [  x]EKG  no acute changes   RVR in V1    [X  ]CXR: cosolidation    --------------------------------------------------------------------------- 38 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  No CP , Palpitations but sob upon minmal exertion which is not his usual   no personal or familial hx of clotts  no recent travel   ROS: still with fatigue, cough , sob    no GI / /Neuro /sych sx      Medications:   MEDICATIONS  (STANDING):  ALBUTerol/ipratropium for Nebulization 3 milliLiter(s) Nebulizer every 6 hours  docusate sodium 100 milliGRAM(s) Oral three times a day  gabapentin 300 milliGRAM(s) Oral at bedtime  levoFLOXacin IVPB 750 milliGRAM(s) IV Intermittent every 24 hours  sodium chloride 0.9%. 1000 milliLiter(s) (200 mL/Hr) IV Continuous <Continuous>    MEDICATIONS  (PRN):  senna 2 Tablet(s) Oral at bedtime PRN Constipation  Allergies  Tylenol Cold &amp; Flu Daytime (Short breath)  Intolerances        PAST MEDICAL & SURGICAL HISTORY:  Hyperthyroidism  Anxiety  H/O carpal tunnel repair      Social :    No smoking       No ETOH use            Vital Signs Last 24 Hrs  T(C): 37.1 (06 Nov 2017 08:55), Max: 38.5 (05 Nov 2017 16:13)  T(F): 98.8 (06 Nov 2017 08:55), Max: 101.3 (05 Nov 2017 16:13)  HR: 70 (06 Nov 2017 08:55) (70 - 103)  BP: 133/81 (06 Nov 2017 08:55) (125/61 - 153/78)  BP(mean): --  RR: 18 (06 Nov 2017 08:55) (18 - 20)  SpO2: 97% (06 Nov 2017 08:55) (89% - 99%)  CAPILLARY BLOOD GLUCOSE        Physical Exam:    General:  NAD but with hoarsness   HEENT:  Nonicteric, PERRLA  CV:  RRR, no murmur, no JVD  Lungs:  CTA B/L, no wheezes, rales, rhonchi  Abdomen:  Soft, non-tender, no distended  Extremities:  2+ pulses, no c/c, no edema  Skin:  Warm and dry, no rashes  Neuro:  AAOx3, non-focal, CN II-XII grossly intact  No Restraints    LABS:                        11.3   12.6  )-----------( 183      ( 06 Nov 2017 05:57 )             32.7     11-06    136  |  102  |  21  ----------------------------<  155<H>  3.5   |  25  |  0.95    Ca    8.1<L>      06 Nov 2017 05:57  Phos  1.8     11-06  Mg     2.0     11-06    TPro  5.8<L>  /  Alb  3.8  /  TBili  0.4  /  DBili  x   /  AST  38  /  ALT  65<H>  /  AlkPhos  50  11-06      Urinalysis Basic - ( 06 Nov 2017 02:34 )    Color: Yellow / Appearance: SL Turbid / SG: >1.030 / pH: x  Gluc: x / Ketone: Trace  / Bili: Negative / Urobili: Negative   Blood: x / Protein: 30 mg/dL / Nitrite: Negative   Leuk Esterase: Negative / RBC: x / WBC 6-10 /HPF   Sq Epi: x / Non Sq Epi: x / Bacteria: x        Ferritin, Serum: 97.0 ng/mL (11-06 @ 07:23)        RADIOLOGY & ADDITIONAL TESTS:    ---------------------------------------------------------------------------  I personally reviewed: [  x]EKG  no acute changes   RVR in V1    [X  ]CXR: cosolidation    ---------------------------------------------------------------------------

## 2017-11-06 NOTE — CONSULT NOTE ADULT - SUBJECTIVE AND OBJECTIVE BOX
MICU CONSULT NOTE    CHIEF COMPLAINT: dyspnea    HPI:  Pt is a 38yo English-speaking M from [facility] with PMH hyperthyroidism, ulnar neuropathy (on oxycodone, meloxicam, and gabapentin), enlarged heart (semi-pro former ) p/w two days of fevers, chills, cough, and shortness of breath. He presented to the ED yesterday and left AMA after waiting for a bed; he was discharged with Levaquin. He presented again today with worsening symptoms.  Pulmonary medicine was consulted in the ED for CT chest concerning for PNA, and MICU was called for hypoxia (91% on room air, resolved with NC O2). The pt has a 13-mo child and wife at home with no new sick contacts. 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 The mask has irritated the bridge of his nose, leaving an open wound.  He was receiving 200cc/hr started overnight, stopped by pulmonary medicine after consult this AM. He has a cough productive of sputum (rust-colored yesterday, white today), fevers, chills, fatigue, malaise, and myalgias. He denies new headaches, changes in vision, runny nose, sore throat, chest pain, bone pain, dysuria, testicular pain, BRBPR, melena, diarrhea, worsening constipation, LE swelling. Also of note, pt was treated for bronchitis 8 weeks ago with amoxicillin and steroids with resolution of symptoms after taking all medications to completion as prescribed.     ED course: vitals Tmax 101.1degF, HR 70's-80's, 's/80's, RR 18, 99% on NC O2.  Medications: NS 1L + 200cc/hr x 6 hours, Duoneb x 3.   Labs: WBC 12.6, Hgb 11.3, Plts 103, Ca 8.1, VBG pH 7.39, lactate 1.0, paCO2 44, U/A trace keton/30 protein.  Micro: sputum cx GNR's, RVP negative, blood cx'2 x 2 collected pending    PAST MEDICAL & SURGICAL HISTORY:  Hyperthyroidism  Anxiety  H/O carpal tunnel repair    FAMILY HISTORY:  Family history of coronary artery bypass graft (Grandparent)    SOCIAL HISTORY:  Denies smoking, EtOH, recreational drug use  Works in piping, wears mask to avoid fiberglass inhalation  Sexually active with wife only, wears protection  13-mo child at home  No recent sick contacts  No recent travel  From [facility]    Allergies  Tylenol Cold &amp; Flu Daytime (Short breath)    Intolerances  N/A    HOME MEDICATIONS:  meloxicam 15mg q24h  oxycodone 5mg TID prn  gabapentin 300mg q24h    REVIEW OF SYSTEMS:  Constitutional: [ ] negative [x ] fevers [x ] chills [ ] weight loss [ ] weight gain  HEENT: [ ] negative [ ] dry eyes [ ] eye irritation [ ] postnasal drip [ ] nasal congestion [x] nasal bridge wound  CV: [x ] negative  [ ] chest pain [ ] orthopnea [ ] palpitations [ ] murmur  Resp: [ ] negative [x ] cough [ x] shortness of breath [ x] dyspnea [ ] wheezing [ ] sputum [ ] hemoptysis  GI: [x ] negative [ ] nausea [ ] vomiting [ ] diarrhea [ ] constipation [ ] abd pain [ ] dysphagia   : [x ] negative [ ] dysuria [ ] nocturia [ ] hematuria [ ] increased urinary frequency  Musculoskeletal: [ ] negative [ ] back pain [ ] myalgias [ ] arthralgias [ ] fracture [x] chronic R arm pain  Skin: [ ] negative [ ] rash [ ] itch [x] mid-upper-back rash  Neurological: [x ] negative [ ] headache [ ] dizziness [ ] syncope [ ] weakness [ ] numbness  Psychiatric: [x ] negative [ ] anxiety [ ] depression  Endocrine: [ ] negative [ ] diabetes [x ] thyroid problem  Hematologic/Lymphatic: [x ] negative [ ] anemia [ ] bleeding problem  Allergic/Immunologic: [x ] negative [ ] itchy eyes [ ] nasal discharge [ ] hives [ ] angioedema  [x ] All other systems negative  [ ] Unable to assess ROS because ________    OBJECTIVE:  ICU Vital Signs Last 24 Hrs  T(C): 37.1 (06 Nov 2017 08:55), Max: 38.5 (05 Nov 2017 16:13)  T(F): 98.8 (06 Nov 2017 08:55), Max: 101.3 (05 Nov 2017 16:13)  HR: 80 (06 Nov 2017 12:50) (70 - 103)  BP: 133/81 (06 Nov 2017 08:55) (125/61 - 153/78)  BP(mean): --  ABP: --  ABP(mean): --  RR: 18 (06 Nov 2017 12:50) (18 - 20)  SpO2: 91% (06 Nov 2017 12:50) (89% - 99%)    Vital Signs Last 24 Hrs  T(C): 37.1 (06 Nov 2017 08:55), Max: 38.5 (05 Nov 2017 16:13)  T(F): 98.8 (06 Nov 2017 08:55), Max: 101.3 (05 Nov 2017 16:13)  HR: 80 (06 Nov 2017 12:50) (70 - 103)  BP: 133/81 (06 Nov 2017 08:55) (125/61 - 153/78)  BP(mean): --  RR: 18 (06 Nov 2017 12:50) (18 - 20)  SpO2: 91% (06 Nov 2017 12:50) (89% - 99%)    PHYSICAL EXAM:  GENERAL: NAD lying flat in bed with NC in place, occasionally coughing, conversing in full sentences  HEAD:  Atraumatic, Normocephalic  EYES: EOMI, PERRLA, conjunctiva and sclera clear  ENMT: NC in place, bridge of nose irritated with healing wound, no nasal discharge, no oral lesions or palatal erythema  NECK: Supple, No JVD  NERVOUS SYSTEM: AOX3, motor and sensation grossly intact in b/l UE and b/l LE  PSYCHIATRIC: Appropriate affect and mood  CHEST/LUNG: coarse b/l breath sounds  HEART: Regular rate and rhythm; No murmurs, rubs, or gallops. No LE edema  ABDOMEN: Soft, Nontender, Nondistended; Bowel sounds present  EXTREMITIES:  2+ Peripheral Pulses, No clubbing, cyanosis  SKIN: No rashes or lesions    LINES:     HOSPITAL MEDICATIONS:  Standing Meds:  ALBUTerol/ipratropium for Nebulization 3 milliLiter(s) Nebulizer every 6 hours  azithromycin  IVPB 500 milliGRAM(s) IV Intermittent once  azithromycin  IVPB      cefTRIAXone   IVPB 1 Gram(s) IV Intermittent once  cefTRIAXone   IVPB      docusate sodium 100 milliGRAM(s) Oral three times a day  gabapentin 300 milliGRAM(s) Oral at bedtime      PRN Meds:  senna 2 Tablet(s) Oral at bedtime PRN      LABS:                        11.3   12.6  )-----------( 183      ( 06 Nov 2017 05:57 )             32.7     Hgb Trend: 11.3<--, 13.8<--  11-06    136  |  102  |  21  ----------------------------<  155<H>  3.5   |  25  |  0.95    Ca    8.1<L>      06 Nov 2017 05:57  Phos  1.8     11-06  Mg     2.0     11-06    TPro  5.8<L>  /  Alb  3.8  /  TBili  0.4  /  DBili  x   /  AST  38  /  ALT  65<H>  /  AlkPhos  50  11-06    Creatinine Trend: 0.95<--, 1.02<--    Urinalysis Basic - ( 06 Nov 2017 02:34 )    Color: Yellow / Appearance: SL Turbid / SG: >1.030 / pH: x  Gluc: x / Ketone: Trace  / Bili: Negative / Urobili: Negative   Blood: x / Protein: 30 mg/dL / Nitrite: Negative   Leuk Esterase: Negative / RBC: x / WBC 6-10 /HPF   Sq Epi: x / Non Sq Epi: x / Bacteria: x        Venous Blood Gas:  11-06 @ 05:57  7.39/44/45/26/80  VBG Lactate: 1.0  Venous Blood Gas:  11-05 @ 16:29  7.41/38/42/24/77  VBG Lactate: 2.1      MICROBIOLOGY:     Culture - Sputum . (11.06.17 @ 07:51)    Gram Stain:   Few polymorphonuclear leukocytes per low power field  Rare Squamous epithelial cells per low power field  Rare Gram Negative Rods per oil power field    Specimen Source: .Sputum Sputum cup received      Urinalysis + Microscopic Examination (11.06.17 @ 02:34)    Nitrite: Negative    Ketone - Urine: Trace    Urobilinogen: Negative    Specific Gravity: >1.030    Protein, Urine: 30 mg/dL    pH Urine: 5.5    Leukocyte Esterase Concentration: Negative    Glucose Qualitative, Urine: Negative    Blood, Urine: Negative    Urine Appearance: SL Turbid    Bilirubin: Negative    Color: Yellow    White Blood Cell - Urine: 6-10 /HPF      Rapid Respiratory Viral Panel (11.06.17 @ 01:28)    Rapid RVP Result: NotDetec: The Film[employer] RVP Rapid uses polymerase chain reaction (PCR) and melt  curve analysis to screen for adenovirus; coronavirus HKU1, NL63, 229E,  OC43; human metapneumovirus (hMPV); human enterovirus/rhinovirus  (Entero/RV); influenza A; influenza A/H1;influenza A/H3; influenza  A/H1-2009; influenza B; parainfluenza viruses 1, 2, 3, 4; respiratory  syncytial virus; Bordetella pertussis; Mycoplasma pneumoniae; and  Chlamydophila pneumoniae.        RADIOLOGY:  [ x] Reviewed and interpreted by me  CT chest read pending: on my read b/l PNA R>L  US abdomen RUQ Normal right upper quadrant abdominal ultrasound.  CXR: prelim read R middle L opacity concerning for infection.      EKG: pending

## 2017-11-06 NOTE — H&P ADULT - ASSESSMENT
37M from [facility], [place], c hx hyperthyroidism, anxiety, "athlete's heart", pw sepsis 2/2 CAP

## 2017-11-06 NOTE — CONSULT NOTE ADULT - ASSESSMENT
Pt is a 38yo English-speaking M from [facility], [place] with PMH hyperthyroidism, ulnar neuropathy (on oxycodone, meloxicam, and gabapentin), enlarged heart (semi-pro former ) p/w two days of fevers, chills, cough, and shortness of breath, admitted with community-acquired PNA.    #Community-acquired PNA: pt presents with mild leukocytosis, hemodynamically stable, dyspneic, hypoxic on RA with R>L mid-lung crackles, CXR and CT chest concerning for infection. He was discharged from ED (AMA) yesterday with Levaquin with worsening symptoms over past 24 hours. Lactate 2.1 yesterday improved to 1.0 today.  -Start ceftriaxone and azithromycin.  -Hold IV fluids  -Check urine legionella  -f/u blood cultures  -f/u sputum cx speciation and sensitivities.  -c/w NC O2, weaning as tolerated.  -f/u CT chest and CXR final reads    #Hx of thyroid disorder:     #Dispo: not a MICU candidate at this time. Please reconsult as needed.     Mathew Juan  MICU resident, PGY-2  [id number], [phone] Pt is a 36yo English-speaking M from [facility] with PMH hyperthyroidism, ulnar neuropathy (on oxycodone, meloxicam, and gabapentin), enlarged heart (semi-pro former ) p/w two days of fevers, chills, cough, and shortness of breath, admitted with community-acquired PNA.    #Community-acquired PNA: pt presents with mild leukocytosis, hemodynamically stable, dyspneic, hypoxic on RA with R>L mid-lung crackles, CXR and CT chest concerning for infection. He was discharged from ED (AMA) yesterday with Levaquin with worsening symptoms over past 24 hours. Lactate 2.1 yesterday improved to 1.0 today.  -Start ceftriaxone and azithromycin.  -Hold IV fluids  -Check urine legionella  -f/u blood cultures  -f/u sputum cx speciation and sensitivities.  -c/w NC O2, weaning as tolerated.  -f/u CT chest and CXR final reads  -RVP negative.  -U/A not concerning for infection.    #Hx of thyroid disorder: check baseline TSH.    #Ulnar neuropathy: c/w home gabapentin.    #Dispo: not a MICU candidate at this time. Please reconsult as needed.     Mathew Juan  MICU resident, PGY-2  [id number], [phone] Pt is a 36yo English-speaking M from [facility] with PMH hyperthyroidism, ulnar neuropathy (on oxycodone, meloxicam, and gabapentin), enlarged heart (semi-pro former ) p/w two days of fevers, chills, cough, and shortness of breath, admitted with community-acquired PNA.    #Community-acquired PNA: pt presents with mild leukocytosis, hemodynamically stable, dyspneic, hypoxic on RA with R>L mid-lung crackles, CXR and CT chest concerning for infection. He was discharged from ED (AMA) yesterday with Levaquin with worsening symptoms over past 24 hours. Lactate 2.1 yesterday improved to 1.0 today. Labs o/w show 8% bands on diff yesterday, now resolved.   -Start ceftriaxone and azithromycin.  -Hold IV fluids  -Check urine legionella  -f/u blood cultures  -f/u sputum cx speciation and sensitivities.  -c/w NC O2, weaning as tolerated.  -f/u CT chest and CXR final reads  -RVP negative.  -U/A not concerning for infection.  -check HIV    #Mild transaminitis: improved on repeat CMP today from yesterday. AST/'s yesterday.  -Consider hepatitis panel if no resolution.  -Abd US wnl's.     #Hx of thyroid disorder: check baseline TSH.    #Ulnar neuropathy: c/w home gabapentin.    #Dispo: not a MICU candidate at this time. Please reconsult as needed.     Mathew Juan  MICU resident, PGY-2  [id number], [phone] Pt is a 36yo English-speaking M from [facility] with PMH hyperthyroidism, ulnar neuropathy (on oxycodone, meloxicam, and gabapentin), enlarged heart (semi-pro former ) p/w two days of fevers, chills, cough, and shortness of breath, admitted with community-acquired PNA.    #Community-acquired PNA: pt presents with mild leukocytosis, hemodynamically stable, dyspneic, hypoxic on RA with R>L mid-lung crackles, CXR and CT chest concerning for infection. He was discharged from ED (AMA) yesterday with Levaquin with worsening symptoms over past 24 hours. Lactate 2.1 yesterday improved to 1.0 today. Labs o/w show 8% bands on diff yesterday, now resolved.   -Start ceftriaxone and azithromycin.  -Hold IV fluids  -Check urine legionella  -f/u blood cultures  -f/u sputum cx speciation and sensitivities.  -c/w NC O2, weaning as tolerated.  -f/u CT chest and CXR final reads  -RVP negative.  -U/A not concerning for infection.  -check HIV    #Mild transaminitis: improved on repeat CMP today from yesterday. AST/'s yesterday.  -Consider hepatitis panel if no resolution.  -Abd US wnl's.     #Hx of thyroid disorder: check baseline TSH.    #Ulnar neuropathy: c/w home gabapentin.    #Hypophosphatemia: replete    #Dispo: not a MICU candidate at this time. Please reconsult as needed.     Mathew Juan  MICU resident, PGY-2  [id number], [phone]

## 2017-11-06 NOTE — H&P ADULT - PROBLEM SELECTOR PLAN 3
- unclear etiology, social intermittent drinker  - pt states he's had transaminitis in the past  - f/u lfts and further workup as outpt

## 2017-11-06 NOTE — H&P ADULT - PROBLEM SELECTOR PLAN 1
- cont levaquin  - f/u blood and sputum culture  - duonebs, chest pt - associated with hypoxic respiratory failure, 93% on 3L NC  - cont levaquin  - f/u blood and sputum culture  - duonebs, chest pt

## 2017-11-06 NOTE — H&P ADULT - NSHPSOCIALHISTORY_GEN_ALL_CORE
Social History:    Marital Status: ( x ) , (  ) Single, (  ) , (  ) , (  )   # of Children: 1  Lives with: (  ) alone, ( x ) children, ( x ) spouse, (  ) parents, (  ) siblings, (  ) friends, (  ) other:   Occupation:     Substance Use/Illicit Drugs: (  ) never used, (  ) other:   Tobacco Usage: ( x ) never smoked, (  ) former smoker, (  ) current smoker, and Total Pack-Years:   Last Alcohol Usage/Frequency/Amount:  3 glasses of wine, 1 week ago

## 2017-11-06 NOTE — H&P ADULT - FAMILY HISTORY
Grandparent  Still living? Unknown  Family history of coronary artery bypass graft, Age at diagnosis: Age Unknown

## 2017-11-06 NOTE — H&P ADULT - HISTORY OF PRESENT ILLNESS
37M from [facility], c hx hyperthyroidism, anxiety, "athlete's heart", pw 2 days fever, chill, cough, presyncope, SOB.    Pt reports having productive cough with brown colored sputum, fevers, chills, body aches, and worsening SOB for the past 2 days. He had 2 episodes of vomiting/coughing up mucous yesterday. He also was lightheaded and passed out on his bed, felt very dry, was not making urine yesterday. Initially presented to ED earlier yesterday, but left AMA with levaquin, presents back because his symptoms were getting worse. He has had 2 doses of his levaquin so far. Reports having SOB at rest, worse on exertion. Denies CP, abd pain, diarrhea. 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    VS: Tm 101.1, P 81, /61, R 18, 93% on 3L NC  In the ED, received NS 1L, duoneb, tylenol

## 2017-11-06 NOTE — CONSULT NOTE ADULT - SUBJECTIVE AND OBJECTIVE BOX
PULMONARY CONSULT  Robles Guy MD  [phone]    Initial HPI on admission:  HPI:  37M from [place], c hx hyperthyroidism, anxiety, "athlete's heart", pw 2 days fever, chill, cough, presyncope, SOB.    Pt reports having productive cough with brown colored sputum, fevers, chills, body aches, and worsening SOB for the past 2 days. He had 2 episodes of vomiting/coughing up mucous yesterday. He also was lightheaded and passed out on his bed, felt very dry, was not making urine yesterday. Initially presented to ED earlier yesterday, but left AMA with levaquin, presents back because his symptoms were getting worse. He has had 2 doses of his levaquin so far. Reports having SOB at rest, worse on exertion. Denies CP, abd pain, diarrhea. 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 Denies history of COPD and is non smoker    VS: Tm 101.1, P 81, /61, R 18, 93% on 3L NC  In the ED, received NS 1L, duoneb, tylenol (06 Nov 2017 04:53)    BRIEF HOSPITAL COURSE: ***    PAST MEDICAL & SURGICAL HISTORY:  Hyperthyroidism  Anxiety  H/O carpal tunnel repair    Allergies    Tylenol Cold &amp; Flu Daytime (Short breath)    Intolerances      FAMILY HISTORY:  Family history of coronary artery bypass graft (Grandparent)    Social history: , spouse, non smoker    Review of Systems: REVIEW OF SYSTEMS:  CONSTITUTIONAL: +weakness. + fevers. +chills. +rigors. No weight loss. Good appetite.  EYES: No visual changes. No eye pain.  ENT: No hearing difficulty. No vertigo. No dysphagia. No Sinusitis/rhinorrhea.  NECK: No pain. No stiffness/rigidity.  CARDIAC: No chest pain. No palpitations.  RESPIRATORY: +cough. +SOB. No hemoptysis.  GASTROINTESTINAL: No abdominal pain. No nausea. +vomiting. No hematemesis. No diarrhea. +constipation. No melena. No hematochezia.  GENITOURINARY: No dysuria. No frequency. No hesitancy. No hematuria.  NEUROLOGICAL: No numbness/tingling. No focal weakness. No incontinence. No headache.  BACK: No back pain.  EXTREMITIES: No lower extremity edema. Full ROM.  SKIN: No rashes. No itching. No other lesions.  PSYCHIATRIC: No depression. +anxiety. No SI/HI.  ALLERGIC: No lip swelling. No hives.  All other review of systems is negative unless indicated above. Unless indicated above, unable to assess ROS 2/2	      Allergies and Intolerances:        Allergies:  	Tylenol Cold & Flu Daytime: Drug, Short breath      Medications:  MEDICATIONS  (STANDING):  ALBUTerol/ipratropium for Nebulization 3 milliLiter(s) Nebulizer every 6 hours  docusate sodium 100 milliGRAM(s) Oral three times a day  gabapentin 300 milliGRAM(s) Oral at bedtime  levoFLOXacin IVPB 750 milliGRAM(s) IV Intermittent every 24 hours  sodium chloride 0.9%. 1000 milliLiter(s) (200 mL/Hr) IV Continuous <Continuous>  sodium phosphate IVPB 15 milliMole(s) IV Intermittent every 6 hours    MEDICATIONS  (PRN):  senna 2 Tablet(s) Oral at bedtime PRN Constipation    Vital Signs Last 24 Hrs  T(C): 37.1 (06 Nov 2017 08:55), Max: 38.5 (05 Nov 2017 16:13)  T(F): 98.8 (06 Nov 2017 08:55), Max: 101.3 (05 Nov 2017 16:13)  HR: 70 (06 Nov 2017 08:55) (70 - 103)  BP: 133/81 (06 Nov 2017 08:55) (125/61 - 153/78)  BP(mean): --  RR: 18 (06 Nov 2017 08:55) (18 - 20)  SpO2: 97% (06 Nov 2017 08:55) (89% - 99%)    LABS:                        11.3   12.6  )-----------( 183      ( 06 Nov 2017 05:57 )             32.7     11-06    136  |  102  |  21  ----------------------------<  155<H>  3.5   |  25  |  0.95    Ca    8.1<L>      06 Nov 2017 05:57  Phos  1.8     11-06  Mg     2.0     11-06    TPro  5.8<L>  /  Alb  3.8  /  TBili  0.4  /  DBili  x   /  AST  38  /  ALT  65<H>  /  AlkPhos  50  11-06    Urinalysis Basic - ( 06 Nov 2017 02:34 )    Color: Yellow / Appearance: SL Turbid / SG: >1.030 / pH: x  Gluc: x / Ketone: Trace  / Bili: Negative / Urobili: Negative   Blood: x / Protein: 30 mg/dL / Nitrite: Negative   Leuk Esterase: Negative / RBC: x / WBC 6-10 /HPF   Sq Epi: x / Non Sq Epi: x / Bacteria: x            CULTURES:        Physical Examination:    General: MIldly toxic,, No acute distress.      HEENT: Pupils equal, reactive to light.  Symmetric.    PULM: Clear without wheeze or rhonchi    CVS: Regular rate and rhythm, no murmurs, rubs, or gallops    ABD: Soft, nondistended, nontender, normoactive bowel sounds, no masses    EXT: No edema, nontender    SKIN: Warm and well perfused, no rashes noted.    NEURO: Alert, oriented, interactive, nonfocal    RADIOLOGY REVIEWED PERSONALLY  CXR: RLL consolidation c/w neumonia: PA and lateral    CT chest:    TTE: PULMONARY CONSULT  Robles Guy MD  [phone]    Initial HPI on admission:  HPI:  37M from [place], c hx hyperthyroidism, anxiety, "athlete's heart", pw 2 days fever, chill, cough, presyncope, SOB.    Pt reports having productive cough with brown colored sputum, fevers, chills, body aches, and worsening SOB for the past 2 days. He had 2 episodes of vomiting/coughing up mucous yesterday. He also was lightheaded and passed out on his bed, felt very dry, was not making urine yesterday. Initially presented to ED earlier yesterday, but left AMA with levaquin, presents back because his symptoms were getting worse. He has had 2 doses of his levaquin so far. Reports having SOB at rest, worse on exertion. Denies CP, abd pain, diarrhea. 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 Denies history of COPD and is non smoker    VS: Tm 101.1, P 81, /61, R 18, 93% on 3L NC  In the ED, received NS 1L, duoneb, tylenol (06 Nov 2017 04:53)    BRIEF HOSPITAL COURSE: ***    PAST MEDICAL & SURGICAL HISTORY:  Hyperthyroidism  Anxiety  H/O carpal tunnel repair    Allergies    Tylenol Cold &amp; Flu Daytime (Short breath)    Intolerances      FAMILY HISTORY:  Family history of coronary artery bypass graft (Grandparent)    Social history: , spouse, non smoker    Review of Systems: REVIEW OF SYSTEMS:  CONSTITUTIONAL: +weakness. + fevers. +chills. +rigors. No weight loss. Good appetite.  EYES: No visual changes. No eye pain.  ENT: No hearing difficulty. No vertigo. No dysphagia. No Sinusitis/rhinorrhea.  NECK: No pain. No stiffness/rigidity.  CARDIAC: No chest pain. No palpitations.  RESPIRATORY: +cough. +SOB. No hemoptysis.  GASTROINTESTINAL: No abdominal pain. No nausea. +vomiting. No hematemesis. No diarrhea. +constipation. No melena. No hematochezia.  GENITOURINARY: No dysuria. No frequency. No hesitancy. No hematuria.  NEUROLOGICAL: No numbness/tingling. No focal weakness. No incontinence. No headache.  BACK: No back pain.  EXTREMITIES: No lower extremity edema. Full ROM.  SKIN: No rashes. No itching. No other lesions.  PSYCHIATRIC: No depression. +anxiety. No SI/HI.  ALLERGIC: No lip swelling. No hives.  All other review of systems is negative unless indicated above. Unless indicated above, unable to assess ROS 2/2	      Allergies and Intolerances:        Allergies:  	Tylenol Cold & Flu Daytime: Drug, Short breath      Medications:  MEDICATIONS  (STANDING):  ALBUTerol/ipratropium for Nebulization 3 milliLiter(s) Nebulizer every 6 hours  docusate sodium 100 milliGRAM(s) Oral three times a day  gabapentin 300 milliGRAM(s) Oral at bedtime  levoFLOXacin IVPB 750 milliGRAM(s) IV Intermittent every 24 hours  sodium chloride 0.9%. 1000 milliLiter(s) (200 mL/Hr) IV Continuous <Continuous>  sodium phosphate IVPB 15 milliMole(s) IV Intermittent every 6 hours    MEDICATIONS  (PRN):  senna 2 Tablet(s) Oral at bedtime PRN Constipation    Vital Signs Last 24 Hrs  T(C): 37.1 (06 Nov 2017 08:55), Max: 38.5 (05 Nov 2017 16:13)  T(F): 98.8 (06 Nov 2017 08:55), Max: 101.3 (05 Nov 2017 16:13)  HR: 70 (06 Nov 2017 08:55) (70 - 103)  BP: 133/81 (06 Nov 2017 08:55) (125/61 - 153/78)  BP(mean): --  RR: 18 (06 Nov 2017 08:55) (18 - 20)  SpO2: 97% (06 Nov 2017 08:55) (89% - 99%)    LABS:                        11.3   12.6  )-----------( 183      ( 06 Nov 2017 05:57 )             32.7     11-06    136  |  102  |  21  ----------------------------<  155<H>  3.5   |  25  |  0.95    Ca    8.1<L>      06 Nov 2017 05:57  Phos  1.8     11-06  Mg     2.0     11-06    TPro  5.8<L>  /  Alb  3.8  /  TBili  0.4  /  DBili  x   /  AST  38  /  ALT  65<H>  /  AlkPhos  50  11-06    Urinalysis Basic - ( 06 Nov 2017 02:34 )    Color: Yellow / Appearance: SL Turbid / SG: >1.030 / pH: x  Gluc: x / Ketone: Trace  / Bili: Negative / Urobili: Negative   Blood: x / Protein: 30 mg/dL / Nitrite: Negative   Leuk Esterase: Negative / RBC: x / WBC 6-10 /HPF   Sq Epi: x / Non Sq Epi: x / Bacteria: x            CULTURES:        Physical Examination:    General: MIldly toxic,, No acute distress.      HEENT: Pupils equal, reactive to light.  Symmetric.    PULM: Clear without wheeze or rhonchi    CVS: Regular rate and rhythm, no murmurs, rubs, or gallops    ABD: Soft, nondistended, nontender, normoactive bowel sounds, no masses    EXT: No edema, nontender    SKIN: Warm and well perfused, no rashes noted.    NEURO: Alert, oriented, interactive, nonfocal    RADIOLOGY REVIEWED PERSONALLY  CXR: RLL consolidation c/w neumonia: PA and lateral    CT chest: Diffuse Multilobar opacities, more pronounced RLL c/w multilobar pna    TTE:

## 2017-11-06 NOTE — H&P ADULT - NSHPLABSRESULTS_GEN_ALL_CORE
Personally reviewed labs.   Personally reviewed imaging.                           13.8   13.7  )-----------( 238      ( 05 Nov 2017 16:29 )             40.5       11-05    138  |  101  |  23  ----------------------------<  147<H>  3.7   |  22  |  1.02    Ca    8.7      05 Nov 2017 16:29    TPro  6.6  /  Alb  4.3  /  TBili  0.8  /  DBili  x   /  AST  101<H>  /  ALT  100<H>  /  AlkPhos  55  11-05            LIVER FUNCTIONS - ( 05 Nov 2017 16:29 )  Alb: 4.3 g/dL / Pro: 6.6 g/dL / ALK PHOS: 55 U/L / ALT: 100 U/L RC / AST: 101 U/L / GGT: x                 Urinalysis Basic - ( 06 Nov 2017 02:34 )    Color: Yellow / Appearance: SL Turbid / SG: >1.030 / pH: x  Gluc: x / Ketone: Trace  / Bili: Negative / Urobili: Negative   Blood: x / Protein: 30 mg/dL / Nitrite: Negative   Leuk Esterase: Negative / RBC: x / WBC 6-10 /HPF   Sq Epi: x / Non Sq Epi: x / Bacteria: x

## 2017-11-06 NOTE — H&P ADULT - NSHPPHYSICALEXAM_GEN_ALL_CORE
PHYSICAL EXAM:   GENERAL: Alert. Not confused. No acute distress. Not cachectic. Not obese. Well-developed.  HEAD:  Atraumatic. Normocephalic.  EYES: EOMI. PERRLA. Normal conjunctiva/sclera.  ENT: Neck supple. No JVD. Moist oral mucosa. Not edentulous. No thrush.  LYMPH: Normal supraclavicular/cervical lymph nodes.   CARDIAC: RRR. S1. S2. No murmur. No rub. No distant heart sounds.  LUNG/CHEST: +R sided wheezes. No rales. No rhonchi.  ABDOMEN: Soft. No tenderness. No distension. No fluid wave. Normal bowel sounds.  BACK: No midline/vertebral tenderness. No flank tenderness.  VASCULAR: +2 b/l radial or ulnar pulses. Palpable DP pulses.  EXTREMITIES:  No clubbing. No cyanosis. No edema. Moving all 4.  NEUROLOGY: A&Ox3. Non-focal exam. Cranial nerves intact. Normal speech.  PSYCH: Normal behavior. Normal affect.  SKIN: No jaundice. No erythema. No rash/lesion.  Vascular Access:       ICU Vital Signs Last 24 Hrs  T(C): 38.4 (06 Nov 2017 03:50), Max: 38.5 (05 Nov 2017 16:13)  T(F): 101.1 (06 Nov 2017 03:50), Max: 101.3 (05 Nov 2017 16:13)  HR: 81 (06 Nov 2017 03:50) (81 - 103)  BP: 125/61 (06 Nov 2017 03:50) (125/61 - 153/78)  BP(mean): --  ABP: --  ABP(mean): --  RR: 18 (06 Nov 2017 03:50) (18 - 20)  SpO2: 99% (06 Nov 2017 03:50) (89% - 99%)      I&O's Summary

## 2017-11-06 NOTE — CONSULT NOTE ADULT - SUBJECTIVE AND OBJECTIVE BOX
HPI:   Patient is a 37y male with little in way of active med problems, prior hyperthyroidism and R carpal tunnel surgery, works in construction- pipe fitting/insulation,  who noted weakness, malaise, nausea, followed by cough as of 4 days ago.  Discolored sputum noted, followed by fever to 103- seen here in ED yest, sent home with Levaquin, but with continued Sxs, returned early this AM- T101 on return, now on Ceftriaxone + Azithro.  No prior h/o lung dz, although treated for bronchitis ~ 2 months ago.  Nonsmoker, no travel or sick contacts.    REVIEW OF SYSTEMS:  All other review of systems negative (Comprehensive ROS)    PAST MEDICAL & SURGICAL HISTORY:  Hyperthyroidism  Anxiety  H/O carpal tunnel repair    Allergies  Tylenol Cold &amp; Flu Daytime (Short breath)    Antimicrobials Day # 1  azithromycin  IVPB      cefTRIAXone   IVPB        Other Medications:  ALBUTerol/ipratropium for Nebulization 3 milliLiter(s) Nebulizer every 6 hours  ALBUTerol/ipratropium for Nebulization 3 milliLiter(s) Nebulizer every 6 hours  docusate sodium 100 milliGRAM(s) Oral three times a day  gabapentin 300 milliGRAM(s) Oral at bedtime  oxyCODONE    5 mG/acetaminophen 325 mG 1 Tablet(s) Oral every 8 hours PRN  senna 2 Tablet(s) Oral at bedtime PRN      FAMILY HISTORY:  Family history of coronary artery bypass graft (Grandparent)      SOCIAL HISTORY:  Smoking: none   ETOH: social   Drug Use: denies        T(F): 98.5 (11-06-17 @ 18:13), Max: 101.1 (11-06-17 @ 03:50)  HR: 69 (11-06-17 @ 18:13)  BP: 143/84 (11-06-17 @ 18:13)  RR: 19 (11-06-17 @ 18:13)  SpO2: 92% (11-06-17 @ 18:13)  Wt(kg): --    PHYSICAL EXAM:  General: alert, no acute distress  Eyes:  anicteric, no conjunctival injection, no discharge  Oropharynx: no lesions or injection 	  Neck: supple, without adenopathy  Lungs: rales R   Heart: regular rate and rhythm; no murmur, rubs or gallops  Abdomen: soft, nondistended, nontender, without mass or organomegaly  Skin: no lesions  Extremities: no clubbing, cyanosis, or edema  Neurologic: alert, oriented, moves all extremities    LAB RESULTS:                        11.3   12.6  )-----------( 183      ( 06 Nov 2017 05:57 )             32.7     11-06    136  |  102  |  21  ----------------------------<  155<H>  3.5   |  25  |  0.95    Ca    8.1<L>      06 Nov 2017 05:57  Phos  1.8     11-06  Mg     2.0     11-06    TPro  5.8<L>  /  Alb  3.8  /  TBili  0.4  /  DBili  x   /  AST  38  /  ALT  65<H>  /  AlkPhos  50  11-06    LIVER FUNCTIONS - ( 06 Nov 2017 05:57 )  Alb: 3.8 g/dL / Pro: 5.8 g/dL / ALK PHOS: 50 U/L / ALT: 65 U/L RC / AST: 38 U/L / GGT: x           Urinalysis Basic - ( 06 Nov 2017 02:34 )    Color: Yellow / Appearance: SL Turbid / SG: >1.030 / pH: x  Gluc: x / Ketone: Trace  / Bili: Negative / Urobili: Negative   Blood: x / Protein: 30 mg/dL / Nitrite: Negative   Leuk Esterase: Negative / RBC: x / WBC 6-10 /HPF   Sq Epi: x / Non Sq Epi: x / Bacteria: x        MICROBIOLOGY:  RECENT CULTURES:  11-06 @ 07:51 .Sputum Sputum cup received       Few polymorphonuclear leukocytes per low power field  Rare Squamous epithelial cells per low power field  Rare Gram Negative Rods per oil power field     Rapid Respiratory Viral Panel (11.06.17 @ 01:28)    Rapid RVP Result: [facility]      RADIOLOGY REVIEWED:  CT Chest No Cont (11.06.17 @ 11:32) >  There are bilateral   groundglass opacities and consolidations throughout both lungs. No   pulmonary nodules.

## 2017-11-06 NOTE — H&P ADULT - ATTENDING COMMENTS
Pt signed out to NP service. Time spent 70 minutes on total encounter. Dr. Johnston to assume care in AM.

## 2017-11-06 NOTE — CHART NOTE - NSCHARTNOTEFT_GEN_A_CORE
JACQUE MAO  37y, Male with PMX of hypothyroid with 101 F, rustic sputum and sob from last night    Chief c/o: sob , fever, cough        Vital Signs:  Vital Signs Last 24 Hrs  T(C): 37.1 (06 Nov 2017 08:55), Max: 38.5 (05 Nov 2017 16:13)  T(F): 98.8 (06 Nov 2017 08:55), Max: 101.3 (05 Nov 2017 16:13)  HR: 80 (06 Nov 2017 12:50) (70 - 103)  BP: 133/81 (06 Nov 2017 08:55) (125/61 - 153/78)  BP(mean): --  RR: 18 (06 Nov 2017 12:50) (18 - 20)  SpO2: 91% (06 Nov 2017 12:50) (89% - 99%)    Labs:                        11.3   12.6  )-----------( 183      ( 06 Nov 2017 05:57 )             32.7     CBC Full  -  ( 06 Nov 2017 05:57 )  WBC Count : 12.6 K/uL  Hemoglobin : 11.3 g/dL  Hematocrit : 32.7 %  Platelet Count - Automated : 183 K/uL  Mean Cell Volume : 89.9 fl  Mean Cell Hemoglobin : 31.1 pg  Mean Cell Hemoglobin Concentration : 34.6 gm/dL  Auto Neutrophil # : 9.9 K/uL  Auto Lymphocyte # : 1.6 K/uL  Auto Monocyte # : 0.8 K/uL  Auto Eosinophil # : 0.2 K/uL  Auto Basophil # : 0.0 K/uL  Auto Neutrophil % : 78.7 %  Auto Lymphocyte % : 12.9 %  Auto Monocyte % : 6.3 %  Auto Eosinophil % : 1.8 %  Auto Basophil % : 0.2 %    11-06    136  |  102  |  21  ----------------------------<  155<H>  3.5   |  25  |  0.95    Ca    8.1<L>      06 Nov 2017 05:57  Phos  1.8     11-06  Mg     2.0     11-06    TPro  5.8<L>  /  Alb  3.8  /  TBili  0.4  /  DBili  x   /  AST  38  /  ALT  65<H>  /  AlkPhos  50  11-06        LIVER FUNCTIONS - ( 06 Nov 2017 05:57 )  Alb: 3.8 g/dL / Pro: 5.8 g/dL / ALK PHOS: 50 U/L / ALT: 65 U/L RC / AST: 38 U/L / GGT: x           ABG - ( 06 Nov 2017 13:23 )  pH: 7.40  /  pCO2: 46    /  pO2: 23    / HCO3: 28    / Base Excess: 2.9   /  SaO2: 35                Adult Advanced Hemodynamics Last 24 Hrs  CVP(mm Hg): --  CVP(cm H2O): --  CO: --  CI: --  PA: --  PA(mean): --  PCWP: --  SVR: --  SVRI: --  PVR: --  PVRI: --      Physical Exam:  PHYSICAL EXAM:      Constitutional:    Eyes:    ENMT:    Neck:    Breasts:    Back:    Respiratory:    Cardiovascular:    Gastrointestinal:    Genitourinary:    Rectal:    Extremities:    Vascular:    Neurological:    Skin:    Lymph Nodes:    Musculoskeletal:    Psychiatric:        Assesment / Plan: JACQUE AMO  37y, Male with PMX of hypothyroid with 101 F, rustic sputum and sob from last night    Chief c/o: sob , fever, cough        Vital Signs:  Vital Signs Last 24 Hrs  T(C): 37.1 (06 Nov 2017 08:55), Max: 38.5 (05 Nov 2017 16:13)  T(F): 98.8 (06 Nov 2017 08:55), Max: 101.3 (05 Nov 2017 16:13)  HR: 80 (06 Nov 2017 12:50) (70 - 103)  BP: 133/81 (06 Nov 2017 08:55) (125/61 - 153/78)  BP(mean): --  RR: 18 (06 Nov 2017 12:50) (18 - 20)  SpO2: 91% (06 Nov 2017 12:50) (89% - 99%)    Labs:                        11.3   12.6  )-----------( 183      ( 06 Nov 2017 05:57 )             32.7     CBC Full  -  ( 06 Nov 2017 05:57 )  WBC Count : 12.6 K/uL  Hemoglobin : 11.3 g/dL  Hematocrit : 32.7 %  Platelet Count - Automated : 183 K/uL  Mean Cell Volume : 89.9 fl  Mean Cell Hemoglobin : 31.1 pg  Mean Cell Hemoglobin Concentration : 34.6 gm/dL  Auto Neutrophil # : 9.9 K/uL  Auto Lymphocyte # : 1.6 K/uL  Auto Monocyte # : 0.8 K/uL  Auto Eosinophil # : 0.2 K/uL  Auto Basophil # : 0.0 K/uL  Auto Neutrophil % : 78.7 %  Auto Lymphocyte % : 12.9 %  Auto Monocyte % : 6.3 %  Auto Eosinophil % : 1.8 %  Auto Basophil % : 0.2 %    11-06    136  |  102  |  21  ----------------------------<  155<H>  3.5   |  25  |  0.95    Ca    8.1<L>      06 Nov 2017 05:57  Phos  1.8     11-06  Mg     2.0     11-06    TPro  5.8<L>  /  Alb  3.8  /  TBili  0.4  /  DBili  x   /  AST  38  /  ALT  65<H>  /  AlkPhos  50  11-06        LIVER FUNCTIONS - ( 06 Nov 2017 05:57 )  Alb: 3.8 g/dL / Pro: 5.8 g/dL / ALK PHOS: 50 U/L / ALT: 65 U/L RC / AST: 38 U/L / GGT: x           ABG - ( 06 Nov 2017 13:23 )  pH: 7.40  /  pCO2: 46    /  pO2: 23    / HCO3: 28    / Base Excess: 2.9   /  SaO2: 35                      Physical Exam:  PHYSICAL EXAM:      Constitutional:   normal build         ENT  no pharyngal congestion    Neck: supple      Back: pt is in [address]    Respiratory: decrease breath sound Lower lobe     Cardiovascular: s1 s2 NSR            Assesment / Plan:37y, Male with PMX of hypothyroid with 101 F, rustic sputum and sob from last night  Pulm - CT chest -  Multifocal PNA  Hypxia - corrected with supplimented oxygen  Pulm consult Dr Guy called  [facility] called - not candidate   ct ceftriaxone and zithromax  D/c with Dr Vickie Perla RPA-C

## 2017-11-07 LAB
ALBUMIN SERPL ELPH-MCNC: 4.4 G/DL — SIGNIFICANT CHANGE UP (ref 3.3–5)
ALP SERPL-CCNC: 72 U/L — SIGNIFICANT CHANGE UP (ref 40–120)
ALT FLD-CCNC: 69 U/L RC — HIGH (ref 10–45)
ANION GAP SERPL CALC-SCNC: 14 MMOL/L — SIGNIFICANT CHANGE UP (ref 5–17)
AST SERPL-CCNC: 28 U/L — SIGNIFICANT CHANGE UP (ref 10–40)
BASOPHILS # BLD AUTO: 0 K/UL — SIGNIFICANT CHANGE UP (ref 0–0.2)
BASOPHILS NFR BLD AUTO: 0 % — SIGNIFICANT CHANGE UP (ref 0–2)
BILIRUB SERPL-MCNC: 0.3 MG/DL — SIGNIFICANT CHANGE UP (ref 0.2–1.2)
BUN SERPL-MCNC: 11 MG/DL — SIGNIFICANT CHANGE UP (ref 7–23)
CALCIUM SERPL-MCNC: 9.2 MG/DL — SIGNIFICANT CHANGE UP (ref 8.4–10.5)
CHLORIDE SERPL-SCNC: 107 MMOL/L — SIGNIFICANT CHANGE UP (ref 96–108)
CK MB BLD-MCNC: 1 % — SIGNIFICANT CHANGE UP (ref 0–3.5)
CK MB CFR SERPL CALC: 1.7 NG/ML — SIGNIFICANT CHANGE UP (ref 0–6.7)
CK SERPL-CCNC: 170 U/L — SIGNIFICANT CHANGE UP (ref 30–200)
CO2 SERPL-SCNC: 24 MMOL/L — SIGNIFICANT CHANGE UP (ref 22–31)
CREAT SERPL-MCNC: 0.78 MG/DL — SIGNIFICANT CHANGE UP (ref 0.5–1.3)
EOSINOPHIL # BLD AUTO: 0 K/UL — SIGNIFICANT CHANGE UP (ref 0–0.5)
EOSINOPHIL NFR BLD AUTO: 0 % — SIGNIFICANT CHANGE UP (ref 0–6)
FERRITIN SERPL-MCNC: 139 NG/ML — SIGNIFICANT CHANGE UP (ref 30–400)
FOLATE SERPL-MCNC: 15.8 NG/ML — SIGNIFICANT CHANGE UP (ref 4.8–24.2)
GLUCOSE SERPL-MCNC: 199 MG/DL — HIGH (ref 70–99)
HCT VFR BLD CALC: 41.6 % — SIGNIFICANT CHANGE UP (ref 39–50)
HGB BLD-MCNC: 13.3 G/DL — SIGNIFICANT CHANGE UP (ref 13–17)
IRON SATN MFR SERPL: 21 UG/DL — LOW (ref 45–165)
IRON SATN MFR SERPL: 7 % — LOW (ref 16–55)
LDH SERPL L TO P-CCNC: 278 U/L — HIGH (ref 50–242)
LYMPHOCYTES # BLD AUTO: 0.8 K/UL — LOW (ref 1–3.3)
LYMPHOCYTES # BLD AUTO: 4 % — LOW (ref 13–44)
MCHC RBC-ENTMCNC: 29.1 PG — SIGNIFICANT CHANGE UP (ref 27–34)
MCHC RBC-ENTMCNC: 32 GM/DL — SIGNIFICANT CHANGE UP (ref 32–36)
MCV RBC AUTO: 91 FL — SIGNIFICANT CHANGE UP (ref 80–100)
MONOCYTES # BLD AUTO: 0.4 K/UL — SIGNIFICANT CHANGE UP (ref 0–0.9)
MONOCYTES NFR BLD AUTO: 4 % — SIGNIFICANT CHANGE UP (ref 2–14)
MYOGLOBIN SERPL-MCNC: <21 NG/ML — SIGNIFICANT CHANGE UP (ref 0–116)
NEUTROPHILS # BLD AUTO: 9.8 K/UL — HIGH (ref 1.8–7.4)
NEUTROPHILS NFR BLD AUTO: 87 % — HIGH (ref 43–77)
NEUTS BAND # BLD: 5 % — SIGNIFICANT CHANGE UP (ref 0–8)
PHOSPHATE SERPL-MCNC: 1.5 MG/DL — LOW (ref 2.5–4.5)
PLAT MORPH BLD: NORMAL — SIGNIFICANT CHANGE UP
PLATELET # BLD AUTO: 206 K/UL — SIGNIFICANT CHANGE UP (ref 150–400)
POTASSIUM SERPL-MCNC: 3.9 MMOL/L — SIGNIFICANT CHANGE UP (ref 3.5–5.3)
POTASSIUM SERPL-SCNC: 3.9 MMOL/L — SIGNIFICANT CHANGE UP (ref 3.5–5.3)
PROT SERPL-MCNC: 7.2 G/DL — SIGNIFICANT CHANGE UP (ref 6–8.3)
RAPID RVP RESULT: SIGNIFICANT CHANGE UP
RBC # BLD: 4.57 M/UL — SIGNIFICANT CHANGE UP (ref 4.2–5.8)
RBC # FLD: 11.4 % — SIGNIFICANT CHANGE UP (ref 10.3–14.5)
RBC BLD AUTO: SIGNIFICANT CHANGE UP
SODIUM SERPL-SCNC: 145 MMOL/L — SIGNIFICANT CHANGE UP (ref 135–145)
T4 FREE SERPL-MCNC: 0.9 NG/DL — SIGNIFICANT CHANGE UP (ref 0.9–1.8)
TIBC SERPL-MCNC: 291 UG/DL — SIGNIFICANT CHANGE UP (ref 220–430)
TROPONIN T SERPL-MCNC: <0.01 NG/ML — SIGNIFICANT CHANGE UP (ref 0–0.06)
TSH SERPL-MCNC: 0.37 UIU/ML — SIGNIFICANT CHANGE UP (ref 0.27–4.2)
UIBC SERPL-MCNC: 270 UG/DL — SIGNIFICANT CHANGE UP (ref 110–370)
VIT B12 SERPL-MCNC: 517 PG/ML — SIGNIFICANT CHANGE UP (ref 243–894)
WBC # BLD: 10.8 K/UL — HIGH (ref 3.8–10.5)
WBC # FLD AUTO: 10.8 K/UL — HIGH (ref 3.8–10.5)

## 2017-11-07 PROCEDURE — 71010: CPT | Mod: 26

## 2017-11-07 RX ORDER — SODIUM,POTASSIUM PHOSPHATES 278-250MG
1 POWDER IN PACKET (EA) ORAL
Qty: 0 | Refills: 0 | Status: COMPLETED | OUTPATIENT
Start: 2017-11-07 | End: 2017-11-09

## 2017-11-07 RX ORDER — ENOXAPARIN SODIUM 100 MG/ML
40 INJECTION SUBCUTANEOUS EVERY 24 HOURS
Qty: 0 | Refills: 0 | Status: DISCONTINUED | OUTPATIENT
Start: 2017-11-07 | End: 2017-11-09

## 2017-11-07 RX ADMIN — CEFTRIAXONE 100 GRAM(S): 500 INJECTION, POWDER, FOR SOLUTION INTRAMUSCULAR; INTRAVENOUS at 13:59

## 2017-11-07 RX ADMIN — OXYCODONE AND ACETAMINOPHEN 1 TABLET(S): 5; 325 TABLET ORAL at 22:31

## 2017-11-07 RX ADMIN — Medication 3 MILLILITER(S): at 23:04

## 2017-11-07 RX ADMIN — ENOXAPARIN SODIUM 40 MILLIGRAM(S): 100 INJECTION SUBCUTANEOUS at 14:41

## 2017-11-07 RX ADMIN — OXYCODONE AND ACETAMINOPHEN 1 TABLET(S): 5; 325 TABLET ORAL at 07:00

## 2017-11-07 RX ADMIN — OXYCODONE AND ACETAMINOPHEN 1 TABLET(S): 5; 325 TABLET ORAL at 06:29

## 2017-11-07 RX ADMIN — Medication 3 MILLILITER(S): at 05:49

## 2017-11-07 RX ADMIN — GABAPENTIN 300 MILLIGRAM(S): 400 CAPSULE ORAL at 22:31

## 2017-11-07 RX ADMIN — Medication 1 TABLET(S): at 17:39

## 2017-11-07 RX ADMIN — AZITHROMYCIN 250 MILLIGRAM(S): 500 TABLET, FILM COATED ORAL at 15:11

## 2017-11-07 RX ADMIN — Medication 3 MILLILITER(S): at 11:26

## 2017-11-07 RX ADMIN — Medication 3 MILLILITER(S): at 17:39

## 2017-11-07 RX ADMIN — OXYCODONE AND ACETAMINOPHEN 1 TABLET(S): 5; 325 TABLET ORAL at 14:41

## 2017-11-07 RX ADMIN — OXYCODONE AND ACETAMINOPHEN 1 TABLET(S): 5; 325 TABLET ORAL at 23:00

## 2017-11-07 RX ADMIN — OXYCODONE AND ACETAMINOPHEN 1 TABLET(S): 5; 325 TABLET ORAL at 15:10

## 2017-11-07 RX ADMIN — Medication 63.75 MILLIMOLE(S): at 16:28

## 2017-11-07 RX ADMIN — Medication 100 MILLIGRAM(S): at 14:41

## 2017-11-07 RX ADMIN — Medication 1 TABLET(S): at 22:31

## 2017-11-07 NOTE — PROGRESS NOTE ADULT - ASSESSMENT
Multilobar commujnity acquired pneumonia  Possible capillary leak/ALI - improved  Hypoxemic resp failure: clinically resolving    REC    Continue antibiotics\  LEgionella AG pending  CHeck RA sats 11/8

## 2017-11-07 NOTE — PROGRESS NOTE ADULT - SUBJECTIVE AND OBJECTIVE BOX
Follow-up Pulm Progress Note  Robles Guy MD  [phone]    AFebrile on ceftriaxone/Axithromycin  Repeat PCR neg; legionella ag pending  CXR this AM: incr density RLL conoslidation  Feels sign betterO2 sat 97% on nasal O2  Sppontaneously diuresing    Medications:  Vital Signs Last 24 Hrs  T(C): 36.6 (07 Nov 2017 08:35), Max: 37.6 (06 Nov 2017 15:51)  T(F): 97.9 (07 Nov 2017 08:35), Max: 99.6 (06 Nov 2017 15:51)  HR: 67 (07 Nov 2017 08:35) (58 - 90)  BP: 125/73 (07 Nov 2017 08:35) (120/72 - 143/84)  BP(mean): --  RR: 18 (07 Nov 2017 08:35) (18 - 19)  SpO2: 96% (07 Nov 2017 08:35) (91% - 98%)  ABG - ( 06 Nov 2017 13:23 )  pH: 7.40  /  pCO2: 46    /  pO2: 23    / HCO3: 28    / Base Excess: 2.9   /  SaO2: 35                  11-06 @ 07:01  -  11-07 @ 07:00  --------------------------------------------------------  IN: 240 mL / OUT: 0 mL / NET: 240 mL        LABS:                        13.3   10.8  )-----------( 206      ( 07 Nov 2017 06:39 )             41.6     11-07    145  |  107  |  11  ----------------------------<  199<H>  3.9   |  24  |  0.78    Ca    9.2      07 Nov 2017 06:39  Phos  1.5     11-07  Mg     2.0     11-06    TPro  7.2  /  Alb  4.4  /  TBili  0.3  /  DBili  x   /  AST  28  /  ALT  69<H>  /  AlkPhos  72  11-07      PT/INR - ( 06 Nov 2017 18:03 )   PT: 13.0 sec;   INR: 1.19 ratio         PTT - ( 06 Nov 2017 18:03 )  PTT:30.7 sec    Serum Pro-Brain Natriuretic Peptide: 356 pg/mL (11-06-17 @ 13:23)      CULTURES:  Culture Results:   Normal Respiratory Maddie present (11-06 @ 07:51)  Culture Results:   No growth to date. (11-06 @ 05:17)  Culture Results:   No growth to date. (11-06 @ 05:17)    Most recent blood culture -- 11-06 @ 07:51   -- -- .Sputum Sputum cup received 11-06 @ 07:51  Most recent blood culture -- 11-06 @ 05:17   -- -- .Blood Blood 11-06 @ 05:17      Physical Examination:  PULM:   CVS: Regular rate and rhythm, no murmurs, rubs, or gallops  ABD: Soft, non-tender  EXT:  No clubbing, cyanosis, or edema    RADIOLOGY REVIEWED  CXR:    CT chest:    TTE:

## 2017-11-07 NOTE — PROGRESS NOTE ADULT - ASSESSMENT
38 yo, here with initially malaise and GI Sxs, followed by productive cough, fever, and noted multifocal pneumonia- CAP  He is at increased risk for Legionella  RVP negative, sputum normal philip, Bld cxs negative- Tx remains empiric  Now afebrile, leukocytosis moderating    Plan:   Continue present regimen- CTX + Azithro while here  Await urine Leg Ag result  If continues to improve, can d/c home with Levaquin as previously prescribed

## 2017-11-07 NOTE — CONSULT NOTE ADULT - SUBJECTIVE AND OBJECTIVE BOX
CARDIOLOGY CONSULT NOTE  PROVIDER: Niko Parks MD  DATE: 11/7/17  REASON: chest pain        HPI:  38 yo M from [facility], with a history of hyperthyroidism, anxiety, "athlete's heart", p/w 2 days fevers, chills, productive cough, dizziness, and SOB. He states that while in the ER he developed chest pain, that was substernal, non-radiating, worsened by deep breathing. It was associated with SOB, but he had SOB without the pain too. Denies palpitations, syncope, N/V/D, diaphoresis. He states he had CP similar to this once about 2 years ago, but he had a cardiac work-up at that time including a stress test, which was normal, per the patient. 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 He has no symptoms at work. He was found to have a multilobar PNA on CT chest.        PAST MEDICAL & SURGICAL HISTORY:  Hyperthyroidism  Anxiety  H/O carpal tunnel repair        MEDICATIONS:  MEDICATIONS  (STANDING):  ALBUTerol/ipratropium for Nebulization 3 milliLiter(s) Nebulizer every 6 hours  azithromycin  IVPB      cefTRIAXone   IVPB      docusate sodium 100 milliGRAM(s) Oral three times a day  gabapentin 300 milliGRAM(s) Oral at bedtime    MEDICATIONS  (PRN):  oxyCODONE    5 mG/acetaminophen 325 mG 1 Tablet(s) Oral every 8 hours PRN back pain  senna 2 Tablet(s) Oral at bedtime PRN Constipation      FAMILY HISTORY:  Family history of coronary artery bypass graft (Grandparent)      SOCIAL HISTORY:  no tobacco or drugs; social alcohol      Allergies    Tylenol Cold &amp; Flu Daytime (Short breath)  	      REVIEW OF SYSTEMS:  CONSTITUTIONAL: No fever, weight loss, or fatigue  EYES: No eye pain, visual disturbances, or discharge  ENMT:  No difficulty hearing, tinnitus, vertigo; No sinus or throat pain  NECK: No pain or stiffness  RESPIRATORY: No cough, wheezing, chills or hemoptysis; No Shortness of Breath  CARDIOVASCULAR: No chest pain, palpitations, loss of consciousness, dizziness, or leg swelling  GASTROINTESTINAL: No abdominal or epigastric pain. No nausea, vomiting, or hematemesis; No diarrhea or constipation. No melena or hematochezia.  GENITOURINARY: No dysuria, frequency, hematuria, or incontinence  NEUROLOGICAL: No headaches, memory loss, loss of strength, numbness, or tremors  SKIN: No itching, burning, rashes, or lesions   	    PHYSICAL EXAM:  Vital Signs Last 24 Hrs  T(C): 36.6 (07 Nov 2017 08:35), Max: 37.6 (06 Nov 2017 15:51)  T(F): 97.9 (07 Nov 2017 08:35), Max: 99.6 (06 Nov 2017 15:51)  HR: 67 (07 Nov 2017 08:35) (58 - 90)  BP: 125/73 (07 Nov 2017 08:35) (120/72 - 143/84)  BP(mean): --  RR: 18 (07 Nov 2017 08:35) (18 - 19)  SpO2: 96% (07 Nov 2017 08:35) (91% - 98%)  I&O's Summary    06 Nov 2017 07:01  -  07 Nov 2017 07:00  --------------------------------------------------------  IN: 240 mL / OUT: 0 mL / NET: 240 mL        Telemetry: sinus 60's-90's    General: NAD, A+Ox3	  HEENT:   No JVD, Normal oral mucosa, EOMI	  Lymphatic: No lymphadenopathy  Cardiovascular: RRR, Normal S1 and S2, No murmurs/gallops/rubs  Respiratory: Lungs clear to auscultation	  Gastrointestinal:  Soft, Non-tender, + BS	  Skin: No rashes, No ecchymoses, No cyanosis	  Neurologic: Non-focal  Extremities: No clubbing, cyanosis or edema  Vascular: normal peripheral pulses palpable bilaterally    ECG: sinus rhythm, borderline QT, otherwise normal ECG    	  LABS:	 	                          13.3   10.8  )-----------( 206      ( 07 Nov 2017 06:39 )             41.6     11-07    145  |  107  |  11  ----------------------------<  199<H>  3.9   |  24  |  0.78    Ca    9.2      07 Nov 2017 06:39  Phos  1.5     11-07  Mg     2.0     11-06    TPro  7.2  /  Alb  4.4  /  TBili  0.3  /  DBili  x   /  AST  28  /  ALT  69<H>  /  AlkPhos  72  11-07    PT/INR - ( 06 Nov 2017 18:03 )   PT: 13.0 sec;   INR: 1.19 ratio         PTT - ( 06 Nov 2017 18:03 )  PTT:30.7 sec  proBNP: Serum Pro-Brain Natriuretic Peptide: 356 pg/mL (11-06 @ 13:23)      HEALTH ISSUES - PROBLEM Dx:  Pre-syncope: Pre-syncope  Transaminitis: Transaminitis  Sepsis, due to unspecified organism: Sepsis, due to unspecified organism  CAP (community acquired pneumonia): CAP (community acquired pneumonia)          ASSESSMENT/PLAN: 	  38 yo M from [facility], with a history of hyperthyroidism, anxiety, "athlete's heart", p/w 2 days fevers, chills, productive cough, dizziness, and SOB - with CP  CP is non-cardiac in nature - reproducible. He has no risk factors for CAD. CPK was mildly elevated with normal MB, MB index and troponin. Likely musculoskeletal CPK elevation. He has no exertional symptoms.  Echo was ordered yesterday. If echo is normal no further cardiac work-up is indicated as an inpatient. If echo shows right heart strain, would consider CTPA to rule out PE, since d-dimer was elevated (although most likely the d-dimer elevation is due to acute infectious process). No evidence of PE on ECG, and pt has no known risk factors for DVT/PE.

## 2017-11-07 NOTE — PROGRESS NOTE ADULT - SUBJECTIVE AND OBJECTIVE BOX
CC: f/u for CAP    Patient reports feeling better, coughing less, now mostly dry; still with weakness and SMITH    REVIEW OF SYSTEMS:  All other review of systems negative (Comprehensive ROS)    Antimicrobials Day # 2  azithromycin  IVPB      cefTRIAXone   IVPB        Other Medications Reviewed    T(F): 98.5 (11-07-17 @ 12:24), Max: 98.7 (11-07-17 @ 00:18)  HR: 72 (11-07-17 @ 12:24)  BP: 126/75 (11-07-17 @ 12:24)  RR: 18 (11-07-17 @ 12:24)  SpO2: 96% (11-07-17 @ 12:24)  Wt(kg): --    PHYSICAL EXAM:  General: alert, no acute distress  Eyes:  anicteric, no conjunctival injection, no discharge  Oropharynx: no lesions or injection 	  Neck: supple, without adenopathy  Lungs: faint rales R  Heart: regular rate and rhythm; no murmur, rubs or gallops  Abdomen: soft, nondistended, nontender, without mass or organomegaly  Skin: no lesions  Extremities: no clubbing, cyanosis, or edema  Neurologic: alert, oriented, moves all extremities    LAB RESULTS:                        13.3   10.8  )-----------( 206      ( 07 Nov 2017 06:39 )             41.6     11-07    145  |  107  |  11  ----------------------------<  199<H>  3.9   |  24  |  0.78    Ca    9.2      07 Nov 2017 06:39  Phos  1.5     11-07  Mg     2.0     11-06    TPro  7.2  /  Alb  4.4  /  TBili  0.3  /  DBili  x   /  AST  28  /  ALT  69<H>  /  AlkPhos  72  11-07    LIVER FUNCTIONS - ( 07 Nov 2017 06:39 )  Alb: 4.4 g/dL / Pro: 7.2 g/dL / ALK PHOS: 72 U/L / ALT: 69 U/L RC / AST: 28 U/L / GGT: x           Urinalysis Basic - ( 06 Nov 2017 02:34 )    Color: Yellow / Appearance: SL Turbid / SG: >1.030 / pH: x  Gluc: x / Ketone: Trace  / Bili: Negative / Urobili: Negative   Blood: x / Protein: 30 mg/dL / Nitrite: Negative   Leuk Esterase: Negative / RBC: x / WBC 6-10 /HPF   Sq Epi: x / Non Sq Epi: x / Bacteria: x      MICROBIOLOGY:  RECENT CULTURES:  11-06 @ 07:51 .Sputum Sputum cup received     Normal Respiratory Maddie present    Few polymorphonuclear leukocytes per low power field  Rare Squamous epithelial cells per low power field  Rare Gram Negative Rods per oil power field    11-06 @ 05:17 .Blood Blood     No growth to date.    Rapid Respiratory Viral Panel (11.07.17 @ 00:07)    Rapid RVP Result: [facility]    RADIOLOGY REVIEWED:  Xray Chest 1 View AP- PORTABLE-Urgent (11.07.17 @ 07:32) >  Worsening right lower lobe opacity.

## 2017-11-07 NOTE — PROGRESS NOTE ADULT - SUBJECTIVE AND OBJECTIVE BOX
Patient is a 37y old  Male who presents with a chief complaint of fever, cough, presyncope, SOB (2017 04:53)                                                               INTERVAL HPI/OVERNIGHT EVENTS:    REVIEW OF SYSTEMS:     CONSTITUTIONAL: No weakness, fevers or chills  RESPIRATORY: No cough, improving  shortness of breath  CARDIOVASCULAR: No chest pain or palpitations  GASTROINTESTINAL: No abdominal pain  . No nausea, vomiting  GENITOURINARY: No dysuria, frequency  NEUROLOGICAL: No numbness or weakness                                                                                                                                                                                                                                                                                    Medications:  MEDICATIONS  (STANDING):  ALBUTerol/ipratropium for Nebulization 3 milliLiter(s) Nebulizer every 6 hours  azithromycin  IVPB      cefTRIAXone   IVPB      docusate sodium 100 milliGRAM(s) Oral three times a day  enoxaparin Injectable 40 milliGRAM(s) SubCutaneous every 24 hours  gabapentin 300 milliGRAM(s) Oral at bedtime    MEDICATIONS  (PRN):  oxyCODONE    5 mG/acetaminophen 325 mG 1 Tablet(s) Oral every 8 hours PRN back pain  senna 2 Tablet(s) Oral at bedtime PRN Constipation       Allergies    Tylenol Cold &amp; Flu Daytime (Short breath)    Intolerances      Vital Signs Last 24 Hrs  T(C): 36.9 (2017 12:24), Max: 37.6 (2017 15:51)  T(F): 98.5 (2017 12:24), Max: 99.6 (2017 15:51)  HR: 72 (2017 12:24) (58 - 90)  BP: 126/75 (2017 12:24) (120/72 - 143/84)  BP(mean): --  RR: 18 (2017 12:24) (18 - 19)  SpO2: 96% (2017 12:24) (92% - 98%)  CAPILLARY BLOOD GLUCOSE           @ :  -   @ 07:00  --------------------------------------------------------  IN: 240 mL / OUT: 0 mL / NET: 240 mL     @ 07: @ 13:06  --------------------------------------------------------  IN: 320 mL / OUT: 0 mL / NET: 320 mL      Physical Exam:    Daily Height in cm: 185.42 (2017 18:13)    Daily Weight in k.9 (2017 04:24)  General:  Well appearing, NAD, not cachetic  HEENT:  Nonicteric, PERRLA  CV:  RRR, S1S2   Lungs:  decreased bs at bases and scattered ronchi/ wheezing   Abdomen:  Soft, non-tender, no distended, positive BS  Extremities:  2+ pulses, no c/c, no edema  Skin:  Warm and dry, no rashes  Neuro:  AAOx3, non-focal, grossly intact                                                                                                                                                                                                                                                                                                LABS:                               13.3   10.8  )-----------( 206      ( 2017 06:39 )             41.6                      11-    145  |  107  |  11  ----------------------------<  199<H>  3.9   |  24  |  0.78    Ca    9.2      2017 06:39  Phos  1.5       Mg     2.0         TPro  7.2  /  Alb  4.4  /  TBili  0.3  /  DBili  x   /  AST  28  /  ALT  69<H>  /  AlkPhos  72

## 2017-11-07 NOTE — PROGRESS NOTE ADULT - ASSESSMENT
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  No CP , Palpitations but sob upon minmal exertion which is not his usual   no personal or familial hx of clotts  no recent travel   ROS: still with fatigue, cough , sob    no GI / /Neuro /sych sx  1- sepsis likely sec to pna however pt with sob on exertion and hypoxia on exertoin,  CT chest: multkifocal pna ..   discussed with Dr. Guy and appreciate input   apprecite ID input    cont abx with cef /zithro  elevated D-dimer likely sec to infection however will check duplex   2- acute anemia in a young healthy male , repeat within normal .. monitor   3- hoarseness : await ent input   4- hypophasphate: replete and monitor   5- episode of CP last night :  now CP free .. d/w Dr. Parks .. appreciate input :  CP is non-cardiac in nature - reproducible. He has no risk factors for CAD. CPK was mildly elevated with normal MB, MB index and troponin. Likely musculoskeletal CPK elevation. He has no exertional symptoms.  Echo was ordered yesterday. If echo is normal no further cardiac work-up is indicated as an inpatient. If echo shows right heart strain, would consider CTPA to rule out PE, since d-dimer was elevated (although most likely the d-dimer elevation is due to acute infectious process). No evidence of PE on ECG, and pt has no known risk factors for DVT/PE.  6-: per pharmacy : pt has had xanax  0.5  , ritalin and vyvanse ..   urine tox neg except for opoid sec to morphine and oxy given in ER ./..  reports that he only occasionaly takes xanax and does not take any ritalin or vynase..  d/w pt and wife

## 2017-11-08 LAB
ALBUMIN SERPL ELPH-MCNC: 3.3 G/DL — SIGNIFICANT CHANGE UP (ref 3.3–5)
ALP SERPL-CCNC: 64 U/L — SIGNIFICANT CHANGE UP (ref 40–120)
ALT FLD-CCNC: 52 U/L RC — HIGH (ref 10–45)
ANION GAP SERPL CALC-SCNC: 13 MMOL/L — SIGNIFICANT CHANGE UP (ref 5–17)
AST SERPL-CCNC: 19 U/L — SIGNIFICANT CHANGE UP (ref 10–40)
BASOPHILS # BLD AUTO: 0 K/UL — SIGNIFICANT CHANGE UP (ref 0–0.2)
BASOPHILS NFR BLD AUTO: 0.4 % — SIGNIFICANT CHANGE UP (ref 0–2)
BILIRUB DIRECT SERPL-MCNC: 0.1 MG/DL — SIGNIFICANT CHANGE UP (ref 0–0.2)
BILIRUB INDIRECT FLD-MCNC: 0.1 MG/DL — LOW (ref 0.2–1)
BILIRUB SERPL-MCNC: 0.2 MG/DL — SIGNIFICANT CHANGE UP (ref 0.2–1.2)
BUN SERPL-MCNC: 17 MG/DL — SIGNIFICANT CHANGE UP (ref 7–23)
CALCIUM SERPL-MCNC: 8.6 MG/DL — SIGNIFICANT CHANGE UP (ref 8.4–10.5)
CHLORIDE SERPL-SCNC: 106 MMOL/L — SIGNIFICANT CHANGE UP (ref 96–108)
CK SERPL-CCNC: 58 U/L — SIGNIFICANT CHANGE UP (ref 30–200)
CO2 SERPL-SCNC: 24 MMOL/L — SIGNIFICANT CHANGE UP (ref 22–31)
CREAT SERPL-MCNC: 0.71 MG/DL — SIGNIFICANT CHANGE UP (ref 0.5–1.3)
CULTURE RESULTS: SIGNIFICANT CHANGE UP
EOSINOPHIL # BLD AUTO: 0.3 K/UL — SIGNIFICANT CHANGE UP (ref 0–0.5)
EOSINOPHIL NFR BLD AUTO: 3 % — SIGNIFICANT CHANGE UP (ref 0–6)
FUNGITELL: 34 PG/ML — SIGNIFICANT CHANGE UP (ref 0–59)
GLUCOSE SERPL-MCNC: 105 MG/DL — HIGH (ref 70–99)
HCT VFR BLD CALC: 37.8 % — LOW (ref 39–50)
HGB BLD-MCNC: 12.7 G/DL — LOW (ref 13–17)
LEGIONELLA AG UR QL: NEGATIVE — SIGNIFICANT CHANGE UP
LYMPHOCYTES # BLD AUTO: 2.8 K/UL — SIGNIFICANT CHANGE UP (ref 1–3.3)
LYMPHOCYTES # BLD AUTO: 30.6 % — SIGNIFICANT CHANGE UP (ref 13–44)
MAGNESIUM SERPL-MCNC: 2.3 MG/DL — SIGNIFICANT CHANGE UP (ref 1.6–2.6)
MCHC RBC-ENTMCNC: 30.5 PG — SIGNIFICANT CHANGE UP (ref 27–34)
MCHC RBC-ENTMCNC: 33.7 GM/DL — SIGNIFICANT CHANGE UP (ref 32–36)
MCV RBC AUTO: 90.5 FL — SIGNIFICANT CHANGE UP (ref 80–100)
MONOCYTES # BLD AUTO: 0.5 K/UL — SIGNIFICANT CHANGE UP (ref 0–0.9)
MONOCYTES NFR BLD AUTO: 5.5 % — SIGNIFICANT CHANGE UP (ref 2–14)
NEUTROPHILS # BLD AUTO: 5.6 K/UL — SIGNIFICANT CHANGE UP (ref 1.8–7.4)
NEUTROPHILS NFR BLD AUTO: 60.5 % — SIGNIFICANT CHANGE UP (ref 43–77)
PHOSPHATE SERPL-MCNC: 4.2 MG/DL — SIGNIFICANT CHANGE UP (ref 2.5–4.5)
PLATELET # BLD AUTO: 225 K/UL — SIGNIFICANT CHANGE UP (ref 150–400)
POTASSIUM SERPL-MCNC: 4 MMOL/L — SIGNIFICANT CHANGE UP (ref 3.5–5.3)
POTASSIUM SERPL-SCNC: 4 MMOL/L — SIGNIFICANT CHANGE UP (ref 3.5–5.3)
PROT SERPL-MCNC: 5.9 G/DL — LOW (ref 6–8.3)
RBC # BLD: 4.18 M/UL — LOW (ref 4.2–5.8)
RBC # FLD: 11.6 % — SIGNIFICANT CHANGE UP (ref 10.3–14.5)
SODIUM SERPL-SCNC: 143 MMOL/L — SIGNIFICANT CHANGE UP (ref 135–145)
SPECIMEN SOURCE: SIGNIFICANT CHANGE UP
WBC # BLD: 9.2 K/UL — SIGNIFICANT CHANGE UP (ref 3.8–10.5)
WBC # FLD AUTO: 9.2 K/UL — SIGNIFICANT CHANGE UP (ref 3.8–10.5)

## 2017-11-08 PROCEDURE — 93306 TTE W/DOPPLER COMPLETE: CPT | Mod: 26

## 2017-11-08 PROCEDURE — 93970 EXTREMITY STUDY: CPT | Mod: 26

## 2017-11-08 RX ORDER — VALACYCLOVIR 500 MG/1
2000 TABLET, FILM COATED ORAL EVERY 12 HOURS
Qty: 0 | Refills: 0 | Status: COMPLETED | OUTPATIENT
Start: 2017-11-08 | End: 2017-11-09

## 2017-11-08 RX ORDER — ACYCLOVIR 50 MG/G
1 OINTMENT TOPICAL ONCE
Qty: 0 | Refills: 0 | Status: COMPLETED | OUTPATIENT
Start: 2017-11-08 | End: 2017-11-08

## 2017-11-08 RX ADMIN — Medication 3 MILLILITER(S): at 05:36

## 2017-11-08 RX ADMIN — CEFTRIAXONE 100 GRAM(S): 500 INJECTION, POWDER, FOR SOLUTION INTRAMUSCULAR; INTRAVENOUS at 15:14

## 2017-11-08 RX ADMIN — OXYCODONE AND ACETAMINOPHEN 1 TABLET(S): 5; 325 TABLET ORAL at 09:02

## 2017-11-08 RX ADMIN — VALACYCLOVIR 2000 MILLIGRAM(S): 500 TABLET, FILM COATED ORAL at 15:09

## 2017-11-08 RX ADMIN — Medication 3 MILLILITER(S): at 18:36

## 2017-11-08 RX ADMIN — AZITHROMYCIN 250 MILLIGRAM(S): 500 TABLET, FILM COATED ORAL at 15:15

## 2017-11-08 RX ADMIN — Medication 63.75 MILLIMOLE(S): at 00:10

## 2017-11-08 RX ADMIN — Medication 3 MILLILITER(S): at 23:08

## 2017-11-08 RX ADMIN — Medication 100 MILLIGRAM(S): at 15:09

## 2017-11-08 RX ADMIN — Medication 1 TABLET(S): at 15:09

## 2017-11-08 RX ADMIN — Medication 1 TABLET(S): at 18:36

## 2017-11-08 RX ADMIN — OXYCODONE AND ACETAMINOPHEN 1 TABLET(S): 5; 325 TABLET ORAL at 10:00

## 2017-11-08 RX ADMIN — Medication 1 TABLET(S): at 09:02

## 2017-11-08 RX ADMIN — OXYCODONE AND ACETAMINOPHEN 1 TABLET(S): 5; 325 TABLET ORAL at 19:44

## 2017-11-08 RX ADMIN — Medication 1 TABLET(S): at 22:13

## 2017-11-08 RX ADMIN — Medication 3 MILLILITER(S): at 15:09

## 2017-11-08 RX ADMIN — ACYCLOVIR 1 APPLICATION(S): 50 OINTMENT TOPICAL at 23:08

## 2017-11-08 RX ADMIN — OXYCODONE AND ACETAMINOPHEN 1 TABLET(S): 5; 325 TABLET ORAL at 20:40

## 2017-11-08 NOTE — PROGRESS NOTE ADULT - SUBJECTIVE AND OBJECTIVE BOX
Follow-up Pulm Progress Note  Robles Guy MD  [phone]    AFebrile on ceftriaxone/Axithromycin  c/o weakness, but no sign dyspnea/cough  Legionella Ag neg    Medications:  Vital Signs Last 24 Hrs  T(C): 36.6 (07 Nov 2017 08:35), Max: 37.6 (06 Nov 2017 15:51)  T(F): 97.9 (07 Nov 2017 08:35), Max: 99.6 (06 Nov 2017 15:51)  HR: 67 (07 Nov 2017 08:35) (58 - 90)  BP: 125/73 (07 Nov 2017 08:35) (120/72 - 143/84)  BP(mean): --  RR: 18 (07 Nov 2017 08:35) (18 - 19)  SpO2: 96% (07 Nov 2017 08:35) (91% - 98%)  ABG - ( 06 Nov 2017 13:23 )  pH: 7.40  /  pCO2: 46    /  pO2: 23    / HCO3: 28    / Base Excess: 2.9   /  SaO2: 35          11-06 @ 07:01  -  11-07 @ 07:00  --------------------------------------------------------  IN: 240 mL / OUT: 0 mL / NET: 240 mL        LABS:                        13.3   10.8  )-----------( 206      ( 07 Nov 2017 06:39 )             41.6     11-07    145  |  107  |  11  ----------------------------<  199<H>  3.9   |  24  |  0.78    Ca    9.2      07 Nov 2017 06:39  Phos  1.5     11-07  Mg     2.0     11-06    TPro  7.2  /  Alb  4.4  /  TBili  0.3  /  DBili  x   /  AST  28  /  ALT  69<H>  /  AlkPhos  72  11-07      PT/INR - ( 06 Nov 2017 18:03 )   PT: 13.0 sec;   INR: 1.19 ratio         PTT - ( 06 Nov 2017 18:03 )  PTT:30.7 sec    Serum Pro-Brain Natriuretic Peptide: 356 pg/mL (11-06-17 @ 13:23)      CULTURES:  Culture Results:   Normal Respiratory Maddie present (11-06 @ 07:51)  Culture Results:   No growth to date. (11-06 @ 05:17)  Culture Results:   No growth to date. (11-06 @ 05:17)    Most recent blood culture -- 11-06 @ 07:51   -- -- .Sputum Sputum cup received 11-06 @ 07:51  Most recent blood culture -- 11-06 @ 05:17   -- -- .Blood Blood 11-06 @ 05:17      Physical Examination:  PULM: Without wheeze or rhonchi  CVS: Regular rate and rhythm, no murmurs, rubs, or gallops  ABD: Soft, non-tender  EXT:  No clubbing, cyanosis, or edema    RADIOLOGY REVIEWED  CXR:    CT chest:    TTE:

## 2017-11-08 NOTE — PROVIDER CONTACT NOTE (OTHER) - ASSESSMENT
Patient fast asleep,easily arousable.denies chest discomfort ,dizziness.Patient states he is very tired and hence in a deep sleep.Vitals BP-125/79,heart rate 57/mt,O 2 sat 98% on 2 liters via nasal cannulaHeart rate currently in the 50's isnus kale..

## 2017-11-08 NOTE — PROGRESS NOTE ADULT - SUBJECTIVE AND OBJECTIVE BOX
Patient is a 37y old  Male who presents with a chief complaint of fever, cough, presyncope, SOB (2017 04:53)                                                               INTERVAL HPI/OVERNIGHT EVENTS:    REVIEW OF SYSTEMS:     CONSTITUTIONAL: No weakness, fevers or chills  RESPIRATORY: No cough, wheezing,  No shortness of breath  CARDIOVASCULAR: No chest pain or palpitations  GASTROINTESTINAL: No abdominal pain  . No nausea, vomiting, or hematemesis; No diarrhea or constipation. No melena or hematochezia.  GENITOURINARY: No dysuria, frequency   NEUROLOGICAL: No numbness or weakness                                                                                                                                                                                                                                                                                     Medications:  MEDICATIONS  (STANDING):  acyclovir Topical 5% Ointment 1 Application(s) Topical once  ALBUTerol/ipratropium for Nebulization 3 milliLiter(s) Nebulizer every 6 hours  azithromycin  IVPB      cefTRIAXone   IVPB      docusate sodium 100 milliGRAM(s) Oral three times a day  enoxaparin Injectable 40 milliGRAM(s) SubCutaneous every 24 hours  gabapentin 300 milliGRAM(s) Oral at bedtime  potassium acid phosphate/sodium acid phosphate tablet (K-PHOS No. 2) 1 Tablet(s) Oral four times a day with meals  valACYclovir 2000 milliGRAM(s) Oral every 12 hours    MEDICATIONS  (PRN):  oxyCODONE    5 mG/acetaminophen 325 mG 1 Tablet(s) Oral every 8 hours PRN back pain  senna 2 Tablet(s) Oral at bedtime PRN Constipation       Allergies    Tylenol Cold &amp; Flu Daytime (Short breath)    Intolerances      Vital Signs Last 24 Hrs  T(C): 36.7 (2017 20:42), Max: 37 (2017 06:39)  T(F): 98.1 (2017 20:42), Max: 98.6 (2017 06:39)  HR: 66 (2017 21:45) (56 - 66)  BP: 138/84 (2017 21:45) (125/79 - 151/95)  BP(mean): --  RR: 18 (2017 20:43) (16 - 18)  SpO2: 98% (2017 20:43) (93% - 99%)  CAPILLARY BLOOD GLUCOSE           @ 07:01  -   @ 07:00  --------------------------------------------------------  IN: 1470 mL / OUT: 0 mL / NET: 1470 mL     @ 07:01  -   @ 22:37  --------------------------------------------------------  IN: 960 mL / OUT: 0 mL / NET: 960 mL      Physical Exam:     Daily Weight in k (2017 06:39)  General:  Well appearing, NAD  HEENT:  Nonicteric, PERRLA  CV:  RRR, S1S2   Lungs:  decreased bs on Lbase  otherwise CT A  Abdomen:  Soft, non-tender, no distended, positive BS  Extremities:  2+ pulses, no c/c, no edema  Neuro:  AAOx3, non-focal, grossly intact                                                                                                                                                                                                                                                                                                LABS:                               12.7   9.2   )-----------( 225      ( 2017 07:09 )             37.8                          143  |  106  |  17  ----------------------------<  105<H>  4.0   |  24  |  0.71    Ca    8.6      2017 07:09  Phos  4.2       Mg     2.3         TPro  5.9<L>  /  Alb  3.3  /  TBili  0.2  /  DBili  0.1  /  AST  19  /  ALT  52<H>  /  AlkPhos  64

## 2017-11-08 NOTE — PROVIDER CONTACT NOTE (OTHER) - ASSESSMENT
Patient fast asleep,easily arousable,denies dizziness,chest discomfort and SOB.Vitals BP-130/71,heart rate 57/mt O 2 sat 98% on 2 liters oxygen via nasal cannula.Respiratory rate 17/mt.Patient not on any  beta blockers.

## 2017-11-08 NOTE — PROGRESS NOTE ADULT - SUBJECTIVE AND OBJECTIVE BOX
CC: f/u for CAP    Patient reports continues to feel better, coughing less, SMITH less, off O2    REVIEW OF SYSTEMS:  All other review of systems negative (Comprehensive ROS)    Antimicrobials Day # 3  azithromycin  IVPB      cefTRIAXone   IVPB        Other Medications Reviewed    Vital Signs Last 24 Hrs  T(F): 98.3 (08 Nov 2017 11:57), Max: 98.6 (07 Nov 2017 20:30)  HR: 58 (08 Nov 2017 11:57) (56 - 64)  BP: 141/88 (08 Nov 2017 11:57) (114/71 - 141/88)  BP(mean): --  RR: 16 (08 Nov 2017 11:57) (16 - 19)  SpO2: 97% (08 Nov 2017 11:57) (94% - 99%)    PHYSICAL EXAM:  General: alert, no acute distress  Eyes:  anicteric, no conjunctival injection, no discharge  Oropharynx: no lesions or injection 	  Neck: supple, without adenopathy  Lungs: faint rales R  Heart: regular rate and rhythm; no murmur, rubs or gallops  Abdomen: soft, nondistended, nontender, without mass or organomegaly  Skin: no lesions  Extremities: no clubbing, cyanosis, or edema  Neurologic: alert, oriented, moves all extremities    LAB RESULTS:                        12.7   9.2   )-----------( 225      ( 08 Nov 2017 07:09 )             37.8   11-08    143  |  106  |  17  ----------------------------<  105<H>  4.0   |  24  |  0.71    Ca    8.6      08 Nov 2017 07:09  Phos  4.2     11-08  Mg     2.3     11-08    TPro  5.9<L>  /  Alb  3.3  /  TBili  0.2  /  DBili  0.1  /  AST  19  /  ALT  52<H>  /  AlkPhos  64  11-08     MICROBIOLOGY:  RECENT CULTURES:  11-06 @ 07:51 .Sputum Sputum cup received     Normal Respiratory Maddie present    Few polymorphonuclear leukocytes per low power field  Rare Squamous epithelial cells per low power field  Rare Gram Negative Rods per oil power field    11-06 @ 05:17 .Blood Blood     No growth to date.    Rapid Respiratory Viral Panel (11.07.17 @ 00:07)    Rapid RVP Result: NotDetec    Legionella pneumophila Antigen, Urine (11.06.17 @ 19:24)    Legionella Antigen, Urine: Negative      RADIOLOGY REVIEWED:  Xray Chest 1 View AP- PORTABLE-Urgent (11.07.17 @ 07:32) >  Worsening right lower lobe opacity.    VA Duplex Lower Ext Vein Scan, Bilat (11.08.17 @ 14:03) >  No evidence of deep vein thrombosis of either lower extremity.

## 2017-11-08 NOTE — PROGRESS NOTE ADULT - ASSESSMENT
38 yo, here with initially malaise and GI Sxs, followed by productive cough, fever, and noted multifocal pneumonia- CAP  At increased risk for Legionella, but urine antigen negative  RVP negative, sputum normal philip, Bld Cxs negative- Tx empiric  Remains afebrile, subjectively improved, leukocytosis resolved    Plan:   Continue present regimen- CTX + Azithro while here  Given Tx will remain empiric and clinical improvement, no objection to d/c home in AM with Levaquin x 5 days  D/w Dr Guy

## 2017-11-08 NOTE — PROGRESS NOTE ADULT - ASSESSMENT
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  No CP , Palpitations but sob upon minmal exertion which is not his usual   no personal or familial hx of clotts  no recent travel   ROS: still with fatigue, cough , sob    no GI / /Neuro /sych sx  1- sepsis sec to pna  CT chest: multkifocal pna ..   discussed with Dr. Guy and appreciate input   apprecite ID input    cont abx with cef /zithro  elevated D-dimer likely sec to infection however . f/u duplex   2- acute anemia in a: young healthy male , repeat within normal .. monitor   3- hoarseness :imprvoing   4- hypophasphate: replete and monitor   5- episode of CP last night :  now CP free .. .. appreciate input :  CP is non-cardiac in nature - reproducible. He has no risk factors for CAD. CPK was mildly elevated with normal MB, MB index and troponin. Likely musculoskeletal CPK elevation. He has no exertional symptoms.   If echo is normal no further cardiac work-up is indicated as an inpatient. If echo shows right heart strain, would consider CTPA to rule out PE, since d-dimer was elevated (although most likely the d-dimer elevation is due to acute infectious process). No evidence of PE on ECG, and pt has no known risk factors for DVT/PE.  6-: per pharmacy : pt has had xanax  0.5  , ritalin and vyvanse ..   urine tox neg except for opoid sec to morphine and oxy given in ER ./..  reports that he only occasionaly takes xanax and does not take any ritalin or vynase..  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  No CP , Palpitations but sob upon minmal exertion which is not his usual   no personal or familial hx of clotts  no recent travel   ROS: still with fatigue, cough , sob    no GI / /Neuro /sych sx  1- sepsis sec to pna  CT chest: multkifocal pna ..   discussed with Dr. Gyu and appreciate input   apprecite ID input    cont abx with cef /zithro  elevated D-dimer likely sec to infection however . f/u duplex   check O2 sat on RA on exertion to evaluate if hypoxia improving   2- acute anemia in a: young healthy male , repeat within normal .. monitor   3- hoarseness :imprvoing   4- hypophasphate: replete and monitor   5- episode of CP last night :  now CP free .. .. appreciate input :  CP is non-cardiac in nature - reproducible. He has no risk factors for CAD. CPK was mildly elevated with normal MB, MB index and troponin. Likely musculoskeletal CPK elevation. He has no exertional symptoms.   If echo is normal no further cardiac work-up is indicated as an inpatient. If echo shows right heart strain, would consider CTPA to rule out PE, since d-dimer was elevated (although most likely the d-dimer elevation is due to acute infectious process). No evidence of PE on ECG, and pt has no known risk factors for DVT/PE.  6-: per pharmacy : pt has had xanax  0.5  , ritalin and vyvanse ..   urine tox neg except for opoid sec to morphine and oxy given in ER ./..  reports that he only occasionaly takes xanax and does not take any ritalin or vynase..  d/w pt and NP

## 2017-11-08 NOTE — PROGRESS NOTE ADULT - SUBJECTIVE AND OBJECTIVE BOX
CARDIOLOGY FOLLOW UP NOTE - DR. VLAD GRISSOM    Patient states no new complaints.    MEDICATIONS  (STANDING):  ALBUTerol/ipratropium for Nebulization 3 milliLiter(s) Nebulizer every 6 hours  azithromycin  IVPB      cefTRIAXone   IVPB      docusate sodium 100 milliGRAM(s) Oral three times a day  enoxaparin Injectable 40 milliGRAM(s) SubCutaneous every 24 hours  gabapentin 300 milliGRAM(s) Oral at bedtime  potassium acid phosphate/sodium acid phosphate tablet (K-PHOS No. 2) 1 Tablet(s) Oral four times a day with meals  valACYclovir 2000 milliGRAM(s) Oral every 12 hours        PHYSICAL EXAM:  Vital Signs Last 24 Hrs  T(C): 36.8 (08 Nov 2017 11:57), Max: 37 (07 Nov 2017 20:30)  T(F): 98.3 (08 Nov 2017 11:57), Max: 98.6 (07 Nov 2017 20:30)  HR: 58 (08 Nov 2017 11:57) (56 - 64)  BP: 141/88 (08 Nov 2017 11:57) (114/71 - 141/88)  BP(mean): --  RR: 16 (08 Nov 2017 11:57) (16 - 19)  SpO2: 97% (08 Nov 2017 11:57) (94% - 99%)  I&O's Summary    07 Nov 2017 07:01  -  08 Nov 2017 07:00  --------------------------------------------------------  IN: 1470 mL / OUT: 0 mL / NET: 1470 mL    08 Nov 2017 07:01  -  08 Nov 2017 15:15  --------------------------------------------------------  IN: 720 mL / OUT: 0 mL / NET: 720 mL        Telemetry:  sinus 60's-90's    General: NAD, A+Ox3	  HEENT:   No JVD  Cardiovascular: RRR, Normal S1 and S2, No murmurs/gallops/rubs  Respiratory: coarse BS	  Gastrointestinal:  Soft, Non-tender, + BS	  Extremities: No edema    	    LABS:                          12.7   9.2   )-----------( 225      ( 08 Nov 2017 07:09 )             37.8     11-08    143  |  106  |  17  ----------------------------<  105<H>  4.0   |  24  |  0.71    Ca    8.6      08 Nov 2017 07:09  Phos  4.2     11-08  Mg     2.3     11-08    TPro  5.9<L>  /  Alb  3.3  /  TBili  0.2  /  DBili  0.1  /  AST  19  /  ALT  52<H>  /  AlkPhos  64  11-08    proBNP:   PT/INR - ( 06 Nov 2017 18:03 )   PT: 13.0 sec;   INR: 1.19 ratio         PTT - ( 06 Nov 2017 18:03 )  PTT:30.7 sec        HEALTH ISSUES - PROBLEM Dx:  Pre-syncope: Pre-syncope  Transaminitis: Transaminitis  Sepsis, due to unspecified organism: Sepsis, due to unspecified organism  CAP (community acquired pneumonia): CAP (community acquired pneumonia)        Assessment and Plan:  36 yo M from [facility], with a history of hyperthyroidism, anxiety, "athlete's heart", p/w 2 days fevers, chills, productive cough, dizziness, and SOB - with CP  CP is non-cardiac in nature - reproducible. He has not had recurrence of pain in the past two days. He has no risk factors for CAD. CPK was mildly elevated with normal MB, MB index and troponin. Likely musculoskeletal CPK elevation. He has no exertional symptoms.  Echo was ordered. I called the echo lab and they will do it today. If echo is normal no further cardiac work-up is indicated as an inpatient. If echo shows right heart strain, would consider CTPA to rule out PE, since d-dimer was elevated (although most likely the d-dimer elevation is due to acute infectious process). No evidence of PE on ECG, and pt has no known risk factors for DVT/PE.

## 2017-11-08 NOTE — PROGRESS NOTE ADULT - ASSESSMENT
Multilobar commujnity acquired pneumonia  Possible capillary leak/ALI - improved  Hypoxemic resp failure: clinically resolving    REC    Continue antibiotics  CHeck RA sats 11/8  DC planning per clinical status: prob fri/sat

## 2017-11-09 ENCOUNTER — TRANSCRIPTION ENCOUNTER (OUTPATIENT)
Age: 37
End: 2017-11-09

## 2017-11-09 VITALS
TEMPERATURE: 98 F | DIASTOLIC BLOOD PRESSURE: 81 MMHG | HEART RATE: 65 BPM | RESPIRATION RATE: 18 BRPM | OXYGEN SATURATION: 96 % | SYSTOLIC BLOOD PRESSURE: 143 MMHG

## 2017-11-09 LAB
ANION GAP SERPL CALC-SCNC: 15 MMOL/L — SIGNIFICANT CHANGE UP (ref 5–17)
BASOPHILS # BLD AUTO: 0.1 K/UL — SIGNIFICANT CHANGE UP (ref 0–0.2)
BASOPHILS NFR BLD AUTO: 0.7 % — SIGNIFICANT CHANGE UP (ref 0–2)
BUN SERPL-MCNC: 14 MG/DL — SIGNIFICANT CHANGE UP (ref 7–23)
CALCIUM SERPL-MCNC: 9.2 MG/DL — SIGNIFICANT CHANGE UP (ref 8.4–10.5)
CHLORIDE SERPL-SCNC: 104 MMOL/L — SIGNIFICANT CHANGE UP (ref 96–108)
CO2 SERPL-SCNC: 24 MMOL/L — SIGNIFICANT CHANGE UP (ref 22–31)
CREAT SERPL-MCNC: 0.8 MG/DL — SIGNIFICANT CHANGE UP (ref 0.5–1.3)
EOSINOPHIL # BLD AUTO: 0.3 K/UL — SIGNIFICANT CHANGE UP (ref 0–0.5)
EOSINOPHIL NFR BLD AUTO: 3.4 % — SIGNIFICANT CHANGE UP (ref 0–6)
GLUCOSE SERPL-MCNC: 100 MG/DL — HIGH (ref 70–99)
HCT VFR BLD CALC: 42.2 % — SIGNIFICANT CHANGE UP (ref 39–50)
HGB BLD-MCNC: 14.4 G/DL — SIGNIFICANT CHANGE UP (ref 13–17)
LYMPHOCYTES # BLD AUTO: 2.4 K/UL — SIGNIFICANT CHANGE UP (ref 1–3.3)
LYMPHOCYTES # BLD AUTO: 29.4 % — SIGNIFICANT CHANGE UP (ref 13–44)
MCHC RBC-ENTMCNC: 30.5 PG — SIGNIFICANT CHANGE UP (ref 27–34)
MCHC RBC-ENTMCNC: 34.2 GM/DL — SIGNIFICANT CHANGE UP (ref 32–36)
MCV RBC AUTO: 89.1 FL — SIGNIFICANT CHANGE UP (ref 80–100)
MONOCYTES # BLD AUTO: 0.5 K/UL — SIGNIFICANT CHANGE UP (ref 0–0.9)
MONOCYTES NFR BLD AUTO: 6.4 % — SIGNIFICANT CHANGE UP (ref 2–14)
NEUTROPHILS # BLD AUTO: 4.9 K/UL — SIGNIFICANT CHANGE UP (ref 1.8–7.4)
NEUTROPHILS NFR BLD AUTO: 60.1 % — SIGNIFICANT CHANGE UP (ref 43–77)
PLATELET # BLD AUTO: 288 K/UL — SIGNIFICANT CHANGE UP (ref 150–400)
POTASSIUM SERPL-MCNC: 4.1 MMOL/L — SIGNIFICANT CHANGE UP (ref 3.5–5.3)
POTASSIUM SERPL-SCNC: 4.1 MMOL/L — SIGNIFICANT CHANGE UP (ref 3.5–5.3)
RBC # BLD: 4.73 M/UL — SIGNIFICANT CHANGE UP (ref 4.2–5.8)
RBC # FLD: 11.6 % — SIGNIFICANT CHANGE UP (ref 10.3–14.5)
SODIUM SERPL-SCNC: 143 MMOL/L — SIGNIFICANT CHANGE UP (ref 135–145)
WBC # BLD: 8.1 K/UL — SIGNIFICANT CHANGE UP (ref 3.8–10.5)
WBC # FLD AUTO: 8.1 K/UL — SIGNIFICANT CHANGE UP (ref 3.8–10.5)

## 2017-11-09 PROCEDURE — 83735 ASSAY OF MAGNESIUM: CPT

## 2017-11-09 PROCEDURE — 94640 AIRWAY INHALATION TREATMENT: CPT

## 2017-11-09 PROCEDURE — 85014 HEMATOCRIT: CPT

## 2017-11-09 PROCEDURE — 85027 COMPLETE CBC AUTOMATED: CPT

## 2017-11-09 PROCEDURE — 82728 ASSAY OF FERRITIN: CPT

## 2017-11-09 PROCEDURE — 82947 ASSAY GLUCOSE BLOOD QUANT: CPT

## 2017-11-09 PROCEDURE — 84295 ASSAY OF SERUM SODIUM: CPT

## 2017-11-09 PROCEDURE — 83605 ASSAY OF LACTIC ACID: CPT

## 2017-11-09 PROCEDURE — 83880 ASSAY OF NATRIURETIC PEPTIDE: CPT

## 2017-11-09 PROCEDURE — 83874 ASSAY OF MYOGLOBIN: CPT

## 2017-11-09 PROCEDURE — 80048 BASIC METABOLIC PNL TOTAL CA: CPT

## 2017-11-09 PROCEDURE — 80307 DRUG TEST PRSMV CHEM ANLYZR: CPT

## 2017-11-09 PROCEDURE — 87070 CULTURE OTHR SPECIMN AEROBIC: CPT

## 2017-11-09 PROCEDURE — 85730 THROMBOPLASTIN TIME PARTIAL: CPT

## 2017-11-09 PROCEDURE — 80076 HEPATIC FUNCTION PANEL: CPT

## 2017-11-09 PROCEDURE — 85610 PROTHROMBIN TIME: CPT

## 2017-11-09 PROCEDURE — 87633 RESP VIRUS 12-25 TARGETS: CPT

## 2017-11-09 PROCEDURE — 83615 LACTATE (LD) (LDH) ENZYME: CPT

## 2017-11-09 PROCEDURE — 84443 ASSAY THYROID STIM HORMONE: CPT

## 2017-11-09 PROCEDURE — 87486 CHLMYD PNEUM DNA AMP PROBE: CPT

## 2017-11-09 PROCEDURE — 80053 COMPREHEN METABOLIC PANEL: CPT

## 2017-11-09 PROCEDURE — 87040 BLOOD CULTURE FOR BACTERIA: CPT

## 2017-11-09 PROCEDURE — 85379 FIBRIN DEGRADATION QUANT: CPT

## 2017-11-09 PROCEDURE — 71250 CT THORAX DX C-: CPT

## 2017-11-09 PROCEDURE — 82803 BLOOD GASES ANY COMBINATION: CPT

## 2017-11-09 PROCEDURE — 87798 DETECT AGENT NOS DNA AMP: CPT

## 2017-11-09 PROCEDURE — 93970 EXTREMITY STUDY: CPT

## 2017-11-09 PROCEDURE — 81001 URINALYSIS AUTO W/SCOPE: CPT

## 2017-11-09 PROCEDURE — 99285 EMERGENCY DEPT VISIT HI MDM: CPT

## 2017-11-09 PROCEDURE — 82607 VITAMIN B-12: CPT

## 2017-11-09 PROCEDURE — 82330 ASSAY OF CALCIUM: CPT

## 2017-11-09 PROCEDURE — 84100 ASSAY OF PHOSPHORUS: CPT

## 2017-11-09 PROCEDURE — 84439 ASSAY OF FREE THYROXINE: CPT

## 2017-11-09 PROCEDURE — 82435 ASSAY OF BLOOD CHLORIDE: CPT

## 2017-11-09 PROCEDURE — 82553 CREATINE MB FRACTION: CPT

## 2017-11-09 PROCEDURE — 71045 X-RAY EXAM CHEST 1 VIEW: CPT

## 2017-11-09 PROCEDURE — 87449 NOS EACH ORGANISM AG IA: CPT

## 2017-11-09 PROCEDURE — 83550 IRON BINDING TEST: CPT

## 2017-11-09 PROCEDURE — 82746 ASSAY OF FOLIC ACID SERUM: CPT

## 2017-11-09 PROCEDURE — 93306 TTE W/DOPPLER COMPLETE: CPT

## 2017-11-09 PROCEDURE — 84132 ASSAY OF SERUM POTASSIUM: CPT

## 2017-11-09 PROCEDURE — 87581 M.PNEUMON DNA AMP PROBE: CPT

## 2017-11-09 PROCEDURE — 84484 ASSAY OF TROPONIN QUANT: CPT

## 2017-11-09 PROCEDURE — 82550 ASSAY OF CK (CPK): CPT

## 2017-11-09 RX ORDER — SENNA PLUS 8.6 MG/1
2 TABLET ORAL
Qty: 60 | Refills: 0 | OUTPATIENT
Start: 2017-11-09 | End: 2017-12-09

## 2017-11-09 RX ORDER — DOCUSATE SODIUM 100 MG
1 CAPSULE ORAL
Qty: 90 | Refills: 0 | OUTPATIENT
Start: 2017-11-09 | End: 2017-12-09

## 2017-11-09 RX ORDER — MELOXICAM 15 MG/1
1 TABLET ORAL
Qty: 0 | Refills: 0 | COMMUNITY

## 2017-11-09 RX ADMIN — OXYCODONE AND ACETAMINOPHEN 1 TABLET(S): 5; 325 TABLET ORAL at 05:12

## 2017-11-09 RX ADMIN — OXYCODONE AND ACETAMINOPHEN 1 TABLET(S): 5; 325 TABLET ORAL at 05:45

## 2017-11-09 RX ADMIN — Medication 3 MILLILITER(S): at 13:17

## 2017-11-09 RX ADMIN — Medication 1 TABLET(S): at 09:29

## 2017-11-09 RX ADMIN — VALACYCLOVIR 2000 MILLIGRAM(S): 500 TABLET, FILM COATED ORAL at 04:50

## 2017-11-09 RX ADMIN — Medication 3 MILLILITER(S): at 04:50

## 2017-11-09 RX ADMIN — OXYCODONE AND ACETAMINOPHEN 1 TABLET(S): 5; 325 TABLET ORAL at 13:11

## 2017-11-09 RX ADMIN — AZITHROMYCIN 250 MILLIGRAM(S): 500 TABLET, FILM COATED ORAL at 13:11

## 2017-11-09 RX ADMIN — CEFTRIAXONE 100 GRAM(S): 500 INJECTION, POWDER, FOR SOLUTION INTRAMUSCULAR; INTRAVENOUS at 13:12

## 2017-11-09 RX ADMIN — Medication 1 TABLET(S): at 13:11

## 2017-11-09 NOTE — PROGRESS NOTE ADULT - ASSESSMENT
Multilobar commujnity acquired pneumonia: rapid clinical improvement    REC    Complete course with po levquin  DC planning  Outpt f/u CXR in 4-6 weeks

## 2017-11-09 NOTE — DISCHARGE NOTE ADULT - CARE PLAN
Principal Discharge DX:	Sepsis, due to unspecified organism  Goal:	completed resolution of infection  Instructions for follow-up, activity and diet:	Follow up with PCP with 1 week of discharge  Secondary Diagnosis:	CAP (community acquired pneumonia)  Goal:	Continue Levaquin x 5 days  Secondary Diagnosis:	Transaminitis  Secondary Diagnosis:	Anxiety Principal Discharge DX:	Sepsis, due to unspecified organism  Goal:	completed resolution of infection  Instructions for follow-up, activity and diet:	Follow up with PCP with 1 week of discharge  Secondary Diagnosis:	CAP (community acquired pneumonia)  Goal:	Complete  Levaquin course  Instructions for follow-up, activity and diet:	Repeat chest xray outpatient in 4-6 weeks  Secondary Diagnosis:	Transaminitis  Secondary Diagnosis:	Anxiety

## 2017-11-09 NOTE — DISCHARGE NOTE ADULT - MEDICATION SUMMARY - MEDICATIONS TO STOP TAKING
I will STOP taking the medications listed below when I get home from the hospital:    DULoxetine  -- milligram(s) by mouth once a day, As Needed    meloxicam 10 mg oral capsule  -- cap(s) by mouth once a day, As Needed    meloxicam 15 mg oral tablet  -- 1 tab(s) by mouth once a day, As Needed

## 2017-11-09 NOTE — DISCHARGE NOTE ADULT - PATIENT PORTAL LINK FT
“You can access the [facility] Patient Portal, offered by [facility], by registering with the following website: [email or web address]”

## 2017-11-09 NOTE — DISCHARGE NOTE ADULT - PLAN OF CARE
completed resolution of infection Follow up with PCP with 1 week of discharge Continue Levaquin x 5 days Complete  Levaquin course Repeat chest xray outpatient in 4-6 weeks

## 2017-11-09 NOTE — DISCHARGE NOTE ADULT - CARE PROVIDER_API CALL
Juan Castro (MD), Family Medicine  [address] [facility]  [address]  [place] [address]  Phone: [phone]  Fax: [phone]

## 2017-11-09 NOTE — PROVIDER CONTACT NOTE (OTHER) - ASSESSMENT
Patient asymptomatic. Patient denies shortness of breath. Patient's O2 Saturation Pre Activity was 98% on Room Air and Post Activity was 95% on Room Air. Patient has an active order for PO Gabapentin 300 mg at bedtime. Patient refusing PO Gabapentin. Patient educated on the benefits of PO Gabapentin and patient verbalizes understanding of education but continues to refuse PO Gabapentin. Patient states that the PO Gabapentin makes him sleepy and groggy and that he will follow up regarding the PO Gabapentin with his outpatient MD.

## 2017-11-09 NOTE — PROGRESS NOTE ADULT - SUBJECTIVE AND OBJECTIVE BOX
Patient is a 37y old  Male who presents with a chief complaint of fever, cough, presyncope, SOB (2017 10:14)                                                               INTERVAL HPI/OVERNIGHT EVENTS:    REVIEW OF SYSTEMS:     CONSTITUTIONAL: No weakness, fevers or chills  RESPIRATORY: No cough, wheezing,  No shortness of breath  CARDIOVASCULAR: No chest pain or palpitations  GASTROINTESTINAL: No abdominal pain  . No nausea, vomiting, nl bm   GENITOURINARY: No dysuria, frequency   NEUROLOGICAL: No numbness or weakness                                                                                                                                                                                                                                                                             Medications:  MEDICATIONS  (STANDING):  ALBUTerol/ipratropium for Nebulization 3 milliLiter(s) Nebulizer every 6 hours  azithromycin  IVPB 500 milliGRAM(s) IV Intermittent every 24 hours  azithromycin  IVPB      cefTRIAXone   IVPB 1 Gram(s) IV Intermittent every 24 hours  cefTRIAXone   IVPB      docusate sodium 100 milliGRAM(s) Oral three times a day  enoxaparin Injectable 40 milliGRAM(s) SubCutaneous every 24 hours  gabapentin 300 milliGRAM(s) Oral at bedtime    MEDICATIONS  (PRN):  oxyCODONE    5 mG/acetaminophen 325 mG 1 Tablet(s) Oral every 8 hours PRN back pain  senna 2 Tablet(s) Oral at bedtime PRN Constipation       Allergies    Tylenol Cold &amp; Flu Daytime (Short breath)    Intolerances      Vital Signs Last 24 Hrs  T(C): 36.8 (2017 12:13), Max: 36.8 (2017 12:13)  T(F): 98.3 (2017 12:13), Max: 98.3 (2017 12:13)  HR: 65 (2017 12:13) (60 - 66)  BP: 143/81 (2017 12:13) (138/84 - 151/95)  BP(mean): --  RR: 18 (2017 12:13) (18 - 18)  SpO2: 96% (2017 12:13) (93% - 98%)  CAPILLARY BLOOD GLUCOSE           @ 07:01  -   @ 07:00  --------------------------------------------------------  IN: 1180 mL / OUT: 0 mL / NET: 1180 mL     @ 07:01  -   @ 15:16  --------------------------------------------------------  IN: 660 mL / OUT: 0 mL / NET: 660 mL      Physical Exam:     Daily Weight in k (2017 04:51)  General:  Well appearing, NAD  HEENT:  Nonicteric, PERRLA  CV:  RRR, S1S2   Lungs:  CTA   Abdomen:  Soft, non-tender, no distended, positive BS  Extremities:  2+ pulses, no c/c, no edema  Skin:  Warm and dry, no rashes  :  No hart  Neuro:  AAOx3, non-focal, grossly intact                                                                                                                                                                                                                                                                                                LABS:                               14.4   8.1   )-----------( 288      ( 2017 06:54 )             42.2                          143  |  104  |  14  ----------------------------<  100<H>  4.1   |  24  |  0.80    Ca    9.2      2017 06:54  Phos  4.2       Mg     2.3         TPro  5.9<L>  /  Alb  3.3  /  TBili  0.2  /  DBili  0.1  /  AST  19  /  ALT  52<H>  /  AlkPhos  64

## 2017-11-09 NOTE — PROGRESS NOTE ADULT - ASSESSMENT
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  No CP , Palpitations but sob upon minmal exertion which is not his usual   no personal or familial hx of clotts  no recent travel   ROS: still with fatigue, cough , sob    no GI / /Neuro /sych sx  1- sepsis sec to pna  CT chest: multkifocal pna ..   d/w Dr. Milan : apprecite ID input, will change to po levaquin    elevated D-dimer likely sec to infection however .  duplex : neg  2- acute anemia in a: young healthy male , repeat within normal .. monitor   3- hoarseness :imprvoing   4- hypophasphate:  improved   5- episode of CP :  now CP free .. .. appreciate input :  Echo NL   no further w/u   6-: per pharmacy : pt has had xanax  0.5  , ritalin and vyvanse .. encouraged to f/u with radha and minimze meds / avoid     d/w pt   with NP and Dr. 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  No CP , Palpitations but sob upon minmal exertion which is not his usual   no personal or familial hx of clotts  no recent travel   ROS: still with fatigue, cough , sob    no GI / /Neuro /sych sx  1- sepsis sec to pna  CT chest: multkifocal pna ..   d/w Dr. Milan : apprecite ID input, will change to po levaquin    elevated D-dimer likely sec to infection however .  duplex : neg  hypoxia resolved ...   2- acute anemia in a: young healthy male , repeat within normal .. monitor   3- hoarseness :imprvoing   4- hypophasphate:  improved   5- episode of CP :  now CP free .. .. appreciate input :  Echo NL   no further w/u   6-: anxiety :  per pharmacy : pt has had xanax  0.5  , ritalin and vyvanse .. pt says he tried them but they never worked and stopped all meds .... encouraged to f/u with radha and minimze meds / avoid     d/w pt   with NP and Dr. Castro

## 2017-11-09 NOTE — PROGRESS NOTE ADULT - PROVIDER SPECIALTY LIST ADULT
Cardiology
Infectious Disease
Infectious Disease
Internal Medicine
Pulmonology
Internal Medicine

## 2017-11-09 NOTE — DISCHARGE NOTE ADULT - MEDICATION SUMMARY - MEDICATIONS TO TAKE
I will START or STAY ON the medications listed below when I get home from the hospital:    acetaminophen-oxycodone 5/325 tablet  -- 1 tab(s) by mouth every 8 hours, As Needed  -- Indication: For Pain    Santino Patel  is medically cleared to return to work 11/14/17  -- Indication: For work note    gabapentin 300 mg oral capsule  -- 1 cap(s) by mouth once a day (at bedtime)  -- Indication: For H/O carpal tunnel repair    docusate sodium 100 mg oral capsule  -- 1 cap(s) by mouth 3 times a day  -- Indication: For Constipation    senna oral tablet  -- 2 tab(s) by mouth once a day (at bedtime), As needed, Constipation  -- Indication: For Constipation    Levaquin 750 mg oral tablet  -- 1 tab(s) by mouth once a day   -- Avoid prolonged or excessive exposure to direct and/or artificial sunlight while taking this medication.  Do not take dairy products, antacids, or iron preparations within one hour of this medication.  Finish all this medication unless otherwise directed by prescriber.  May cause drowsiness or dizziness.  Medication should be taken with plenty of water.    -- Indication: For CAP (community acquired pneumonia)

## 2017-11-09 NOTE — DISCHARGE NOTE ADULT - ADDITIONAL INSTRUCTIONS
Follow up with PCP with 1 week of discharge Follow up with PCP on Monday  - Repeat chest xray outpatient in 4-6 weeks

## 2017-11-09 NOTE — PROGRESS NOTE ADULT - SUBJECTIVE AND OBJECTIVE BOX
Follow-up Pulm Progress Note  Robles Guy MD  [phone]    Notes reviewed  AFebrile on ceftriaxone/Axithromycin  Feels well - wants to go home  TTE essentially normal    Medications:  Vital Signs Last 24 Hrs  T(C): 36.6 (07 Nov 2017 08:35), Max: 37.6 (06 Nov 2017 15:51)  T(F): 97.9 (07 Nov 2017 08:35), Max: 99.6 (06 Nov 2017 15:51)  HR: 67 (07 Nov 2017 08:35) (58 - 90)  BP: 125/73 (07 Nov 2017 08:35) (120/72 - 143/84)  BP(mean): --  RR: 18 (07 Nov 2017 08:35) (18 - 19)  SpO2: 96% (07 Nov 2017 08:35) (91% - 98%)  ABG - ( 06 Nov 2017 13:23 )  pH: 7.40  /  pCO2: 46    /  pO2: 23    / HCO3: 28    / Base Excess: 2.9   /  SaO2: 35          11-06 @ 07:01  -  11-07 @ 07:00  --------------------------------------------------------  IN: 240 mL / OUT: 0 mL / NET: 240 mL        LABS:                        13.3   10.8  )-----------( 206      ( 07 Nov 2017 06:39 )             41.6     11-07    145  |  107  |  11  ----------------------------<  199<H>  3.9   |  24  |  0.78    Ca    9.2      07 Nov 2017 06:39  Phos  1.5     11-07  Mg     2.0     11-06    TPro  7.2  /  Alb  4.4  /  TBili  0.3  /  DBili  x   /  AST  28  /  ALT  69<H>  /  AlkPhos  72  11-07      PT/INR - ( 06 Nov 2017 18:03 )   PT: 13.0 sec;   INR: 1.19 ratio         PTT - ( 06 Nov 2017 18:03 )  PTT:30.7 sec    Serum Pro-Brain Natriuretic Peptide: 356 pg/mL (11-06-17 @ 13:23)      CULTURES:  Culture Results:   Normal Respiratory Maddie present (11-06 @ 07:51)  Culture Results:   No growth to date. (11-06 @ 05:17)  Culture Results:   No growth to date. (11-06 @ 05:17)    Most recent blood culture -- 11-06 @ 07:51   -- -- .Sputum Sputum cup received 11-06 @ 07:51  Most recent blood culture -- 11-06 @ 05:17   -- -- .Blood Blood 11-06 @ 05:17      Physical Examination:  PULM: Without wheeze or rhonchi  CVS: Regular rate and rhythm, no murmurs, rubs, or gallops  ABD: Soft, non-tender  EXT:  No clubbing, cyanosis, or edema    RADIOLOGY REVIEWED  CXR:    CT chest:    TTE:

## 2017-11-11 LAB
CULTURE RESULTS: SIGNIFICANT CHANGE UP
CULTURE RESULTS: SIGNIFICANT CHANGE UP
SPECIMEN SOURCE: SIGNIFICANT CHANGE UP
SPECIMEN SOURCE: SIGNIFICANT CHANGE UP

## 2018-04-28 ENCOUNTER — INPATIENT (INPATIENT)
Facility: HOSPITAL | Age: 38
LOS: 2 days | Discharge: LEFT BEFORE DISCHARGE | DRG: 286 | End: 2018-05-01
Attending: HOSPITALIST | Admitting: INTERNAL MEDICINE
Payer: MEDICAID

## 2018-04-28 VITALS
SYSTOLIC BLOOD PRESSURE: 90 MMHG | TEMPERATURE: 100 F | HEART RATE: 78 BPM | RESPIRATION RATE: 18 BRPM | OXYGEN SATURATION: 88 % | DIASTOLIC BLOOD PRESSURE: 75 MMHG

## 2018-04-28 DIAGNOSIS — I51.4 MYOCARDITIS, UNSPECIFIED: ICD-10-CM

## 2018-04-28 DIAGNOSIS — Z98.890 OTHER SPECIFIED POSTPROCEDURAL STATES: Chronic | ICD-10-CM

## 2018-04-28 LAB
ALBUMIN SERPL ELPH-MCNC: 3.8 G/DL — SIGNIFICANT CHANGE UP (ref 3.3–5)
ALBUMIN SERPL ELPH-MCNC: 3.9 G/DL — SIGNIFICANT CHANGE UP (ref 3.3–5)
ALBUMIN SERPL ELPH-MCNC: 4.1 G/DL — SIGNIFICANT CHANGE UP (ref 3.3–5)
ALP SERPL-CCNC: 53 U/L — SIGNIFICANT CHANGE UP (ref 40–120)
ALP SERPL-CCNC: 58 U/L — SIGNIFICANT CHANGE UP (ref 40–120)
ALP SERPL-CCNC: 60 U/L — SIGNIFICANT CHANGE UP (ref 40–120)
ALT FLD-CCNC: 46 U/L — HIGH (ref 10–45)
ALT FLD-CCNC: 48 U/L — HIGH (ref 10–45)
ALT FLD-CCNC: 51 U/L — HIGH (ref 10–45)
ANION GAP SERPL CALC-SCNC: 13 MMOL/L — SIGNIFICANT CHANGE UP (ref 5–17)
ANION GAP SERPL CALC-SCNC: 13 MMOL/L — SIGNIFICANT CHANGE UP (ref 5–17)
ANION GAP SERPL CALC-SCNC: 16 MMOL/L — SIGNIFICANT CHANGE UP (ref 5–17)
APPEARANCE UR: CLEAR — SIGNIFICANT CHANGE UP
APTT BLD: 108.7 SEC — HIGH (ref 27.5–37.4)
APTT BLD: 48.2 SEC — HIGH (ref 27.5–37.4)
AST SERPL-CCNC: 72 U/L — HIGH (ref 10–40)
AST SERPL-CCNC: 73 U/L — HIGH (ref 10–40)
AST SERPL-CCNC: 86 U/L — HIGH (ref 10–40)
BACTERIA # UR AUTO: NEGATIVE — SIGNIFICANT CHANGE UP
BASE EXCESS BLDMV CALC-SCNC: 3.1 MMOL/L — HIGH (ref -3–3)
BASE EXCESS BLDV CALC-SCNC: -1.5 MMOL/L — SIGNIFICANT CHANGE UP (ref -2–2)
BASOPHILS # BLD AUTO: 0 K/UL — SIGNIFICANT CHANGE UP (ref 0–0.2)
BASOPHILS NFR BLD AUTO: 0.1 % — SIGNIFICANT CHANGE UP (ref 0–2)
BASOPHILS NFR BLD AUTO: 0.2 % — SIGNIFICANT CHANGE UP (ref 0–2)
BASOPHILS NFR BLD AUTO: 0.3 % — SIGNIFICANT CHANGE UP (ref 0–2)
BILIRUB SERPL-MCNC: 0.7 MG/DL — SIGNIFICANT CHANGE UP (ref 0.2–1.2)
BILIRUB SERPL-MCNC: 0.8 MG/DL — SIGNIFICANT CHANGE UP (ref 0.2–1.2)
BILIRUB SERPL-MCNC: 0.9 MG/DL — SIGNIFICANT CHANGE UP (ref 0.2–1.2)
BILIRUB UR-MCNC: NEGATIVE — SIGNIFICANT CHANGE UP
BLD GP AB SCN SERPL QL: NEGATIVE — SIGNIFICANT CHANGE UP
BUN SERPL-MCNC: 17 MG/DL — SIGNIFICANT CHANGE UP (ref 7–23)
BUN SERPL-MCNC: 19 MG/DL — SIGNIFICANT CHANGE UP (ref 7–23)
BUN SERPL-MCNC: 20 MG/DL — SIGNIFICANT CHANGE UP (ref 7–23)
CA-I SERPL-SCNC: 1.1 MMOL/L — LOW (ref 1.12–1.3)
CALCIUM SERPL-MCNC: 8.3 MG/DL — LOW (ref 8.4–10.5)
CALCIUM SERPL-MCNC: 8.4 MG/DL — SIGNIFICANT CHANGE UP (ref 8.4–10.5)
CALCIUM SERPL-MCNC: 8.4 MG/DL — SIGNIFICANT CHANGE UP (ref 8.4–10.5)
CHLORIDE BLDV-SCNC: 108 MMOL/L — SIGNIFICANT CHANGE UP (ref 96–108)
CHLORIDE SERPL-SCNC: 104 MMOL/L — SIGNIFICANT CHANGE UP (ref 96–108)
CHLORIDE SERPL-SCNC: 105 MMOL/L — SIGNIFICANT CHANGE UP (ref 96–108)
CHLORIDE SERPL-SCNC: 105 MMOL/L — SIGNIFICANT CHANGE UP (ref 96–108)
CK MB BLD-MCNC: 10 % — HIGH (ref 0–3.5)
CK MB BLD-MCNC: 9.7 % — HIGH (ref 0–3.5)
CK MB CFR SERPL CALC: 53.8 NG/ML — HIGH (ref 0–6.7)
CK MB CFR SERPL CALC: 64.6 NG/ML — HIGH (ref 0–6.7)
CK MB CFR SERPL CALC: 73.1 NG/ML — HIGH (ref 0–6.7)
CK SERPL-CCNC: 572 U/L — HIGH (ref 30–200)
CK SERPL-CCNC: 668 U/L — HIGH (ref 30–200)
CK SERPL-CCNC: 734 U/L — HIGH (ref 30–200)
CO2 BLDMV-SCNC: 30 MMOL/L — HIGH (ref 21–29)
CO2 BLDV-SCNC: 27 MMOL/L — SIGNIFICANT CHANGE UP (ref 22–30)
CO2 SERPL-SCNC: 22 MMOL/L — SIGNIFICANT CHANGE UP (ref 22–31)
CO2 SERPL-SCNC: 22 MMOL/L — SIGNIFICANT CHANGE UP (ref 22–31)
CO2 SERPL-SCNC: 24 MMOL/L — SIGNIFICANT CHANGE UP (ref 22–31)
COLOR SPEC: YELLOW — SIGNIFICANT CHANGE UP
CREAT SERPL-MCNC: 0.82 MG/DL — SIGNIFICANT CHANGE UP (ref 0.5–1.3)
CREAT SERPL-MCNC: 0.89 MG/DL — SIGNIFICANT CHANGE UP (ref 0.5–1.3)
CREAT SERPL-MCNC: 0.93 MG/DL — SIGNIFICANT CHANGE UP (ref 0.5–1.3)
DIFF PNL FLD: NEGATIVE — SIGNIFICANT CHANGE UP
EOSINOPHIL # BLD AUTO: 0 K/UL — SIGNIFICANT CHANGE UP (ref 0–0.5)
EOSINOPHIL NFR BLD AUTO: 0.1 % — SIGNIFICANT CHANGE UP (ref 0–6)
EPI CELLS # UR: 0 /HPF — SIGNIFICANT CHANGE UP (ref 0–5)
GAS PNL BLDMV: SIGNIFICANT CHANGE UP
GAS PNL BLDV: 136 MMOL/L — SIGNIFICANT CHANGE UP (ref 136–145)
GAS PNL BLDV: SIGNIFICANT CHANGE UP
GLUCOSE BLDV-MCNC: 145 MG/DL — HIGH (ref 70–99)
GLUCOSE SERPL-MCNC: 116 MG/DL — HIGH (ref 70–99)
GLUCOSE SERPL-MCNC: 145 MG/DL — HIGH (ref 70–99)
GLUCOSE SERPL-MCNC: 152 MG/DL — HIGH (ref 70–99)
GLUCOSE UR QL: NEGATIVE MG/DL — SIGNIFICANT CHANGE UP
HBA1C BLD-MCNC: 5.6 % — SIGNIFICANT CHANGE UP (ref 4–5.6)
HCO3 BLDMV-SCNC: 28 MMOL/L — SIGNIFICANT CHANGE UP (ref 20–28)
HCO3 BLDV-SCNC: 25 MMOL/L — SIGNIFICANT CHANGE UP (ref 21–29)
HCT VFR BLD CALC: 41.3 % — SIGNIFICANT CHANGE UP (ref 39–50)
HCT VFR BLD CALC: 44 % — SIGNIFICANT CHANGE UP (ref 39–50)
HCT VFR BLD CALC: 45.9 % — SIGNIFICANT CHANGE UP (ref 39–50)
HCT VFR BLDA CALC: 46 % — SIGNIFICANT CHANGE UP (ref 39–50)
HGB BLD CALC-MCNC: 14.8 G/DL — SIGNIFICANT CHANGE UP (ref 13–17)
HGB BLD-MCNC: 13.5 G/DL — SIGNIFICANT CHANGE UP (ref 13–17)
HGB BLD-MCNC: 14.9 G/DL — SIGNIFICANT CHANGE UP (ref 13–17)
HGB BLD-MCNC: 15.1 G/DL — SIGNIFICANT CHANGE UP (ref 13–17)
HIV 1 & 2 AB SERPL IA.RAPID: SIGNIFICANT CHANGE UP
HOROWITZ INDEX BLDMV+IHG-RTO: 60 — SIGNIFICANT CHANGE UP
HOROWITZ INDEX BLDV+IHG-RTO: SIGNIFICANT CHANGE UP
HYALINE CASTS # UR AUTO: 2 /LPF — SIGNIFICANT CHANGE UP (ref 0–7)
INR BLD: 1.13 RATIO — SIGNIFICANT CHANGE UP (ref 0.88–1.16)
INR BLD: 1.15 RATIO — SIGNIFICANT CHANGE UP (ref 0.88–1.16)
KETONES UR-MCNC: NEGATIVE — SIGNIFICANT CHANGE UP
LACTATE BLDV-MCNC: 2.7 MMOL/L — HIGH (ref 0.7–2)
LEUKOCYTE ESTERASE UR-ACNC: NEGATIVE — SIGNIFICANT CHANGE UP
LYMPHOCYTES # BLD AUTO: 1 K/UL — SIGNIFICANT CHANGE UP (ref 1–3.3)
LYMPHOCYTES # BLD AUTO: 1.2 K/UL — SIGNIFICANT CHANGE UP (ref 1–3.3)
LYMPHOCYTES # BLD AUTO: 1.6 K/UL — SIGNIFICANT CHANGE UP (ref 1–3.3)
LYMPHOCYTES # BLD AUTO: 10.6 % — LOW (ref 13–44)
LYMPHOCYTES # BLD AUTO: 5.3 % — LOW (ref 13–44)
LYMPHOCYTES # BLD AUTO: 7 % — LOW (ref 13–44)
MAGNESIUM SERPL-MCNC: 2.1 MG/DL — SIGNIFICANT CHANGE UP (ref 1.6–2.6)
MCHC RBC-ENTMCNC: 28.3 PG — SIGNIFICANT CHANGE UP (ref 27–34)
MCHC RBC-ENTMCNC: 28.6 PG — SIGNIFICANT CHANGE UP (ref 27–34)
MCHC RBC-ENTMCNC: 29.3 PG — SIGNIFICANT CHANGE UP (ref 27–34)
MCHC RBC-ENTMCNC: 32.7 GM/DL — SIGNIFICANT CHANGE UP (ref 32–36)
MCHC RBC-ENTMCNC: 32.8 GM/DL — SIGNIFICANT CHANGE UP (ref 32–36)
MCHC RBC-ENTMCNC: 33.8 GM/DL — SIGNIFICANT CHANGE UP (ref 32–36)
MCV RBC AUTO: 86.6 FL — SIGNIFICANT CHANGE UP (ref 80–100)
MCV RBC AUTO: 86.9 FL — SIGNIFICANT CHANGE UP (ref 80–100)
MCV RBC AUTO: 87.1 FL — SIGNIFICANT CHANGE UP (ref 80–100)
MONOCYTES # BLD AUTO: 0.8 K/UL — SIGNIFICANT CHANGE UP (ref 0–0.9)
MONOCYTES # BLD AUTO: 0.8 K/UL — SIGNIFICANT CHANGE UP (ref 0–0.9)
MONOCYTES # BLD AUTO: 0.9 K/UL — SIGNIFICANT CHANGE UP (ref 0–0.9)
MONOCYTES NFR BLD AUTO: 4.2 % — SIGNIFICANT CHANGE UP (ref 2–14)
MONOCYTES NFR BLD AUTO: 4.9 % — SIGNIFICANT CHANGE UP (ref 2–14)
MONOCYTES NFR BLD AUTO: 5.9 % — SIGNIFICANT CHANGE UP (ref 2–14)
NEUTROPHILS # BLD AUTO: 12.2 K/UL — HIGH (ref 1.8–7.4)
NEUTROPHILS # BLD AUTO: 14.4 K/UL — HIGH (ref 1.8–7.4)
NEUTROPHILS # BLD AUTO: 17.7 K/UL — HIGH (ref 1.8–7.4)
NEUTROPHILS NFR BLD AUTO: 83.1 % — HIGH (ref 43–77)
NEUTROPHILS NFR BLD AUTO: 87.8 % — HIGH (ref 43–77)
NEUTROPHILS NFR BLD AUTO: 90.4 % — HIGH (ref 43–77)
NITRITE UR-MCNC: NEGATIVE — SIGNIFICANT CHANGE UP
NT-PROBNP SERPL-SCNC: 3147 PG/ML — HIGH (ref 0–300)
O2 CT VFR BLD CALC: 39 MMHG — SIGNIFICANT CHANGE UP (ref 30–65)
PCO2 BLDMV: 48 MMHG — SIGNIFICANT CHANGE UP (ref 30–65)
PCO2 BLDV: 52 MMHG — HIGH (ref 35–50)
PH BLDMV: 7.39 — SIGNIFICANT CHANGE UP (ref 7.32–7.45)
PH BLDV: 7.3 — LOW (ref 7.35–7.45)
PH UR: 5.5 — SIGNIFICANT CHANGE UP (ref 5–8)
PHOSPHATE SERPL-MCNC: 3.5 MG/DL — SIGNIFICANT CHANGE UP (ref 2.5–4.5)
PLATELET # BLD AUTO: 252 K/UL — SIGNIFICANT CHANGE UP (ref 150–400)
PLATELET # BLD AUTO: 253 K/UL — SIGNIFICANT CHANGE UP (ref 150–400)
PLATELET # BLD AUTO: 268 K/UL — SIGNIFICANT CHANGE UP (ref 150–400)
PO2 BLDV: 20 MMHG — LOW (ref 25–45)
POTASSIUM BLDV-SCNC: 4.8 MMOL/L — SIGNIFICANT CHANGE UP (ref 3.5–5)
POTASSIUM SERPL-MCNC: 3.7 MMOL/L — SIGNIFICANT CHANGE UP (ref 3.5–5.3)
POTASSIUM SERPL-MCNC: 4 MMOL/L — SIGNIFICANT CHANGE UP (ref 3.5–5.3)
POTASSIUM SERPL-MCNC: 5.3 MMOL/L — SIGNIFICANT CHANGE UP (ref 3.5–5.3)
POTASSIUM SERPL-SCNC: 3.7 MMOL/L — SIGNIFICANT CHANGE UP (ref 3.5–5.3)
POTASSIUM SERPL-SCNC: 4 MMOL/L — SIGNIFICANT CHANGE UP (ref 3.5–5.3)
POTASSIUM SERPL-SCNC: 5.3 MMOL/L — SIGNIFICANT CHANGE UP (ref 3.5–5.3)
PROT SERPL-MCNC: 6.3 G/DL — SIGNIFICANT CHANGE UP (ref 6–8.3)
PROT SERPL-MCNC: 6.4 G/DL — SIGNIFICANT CHANGE UP (ref 6–8.3)
PROT SERPL-MCNC: 6.7 G/DL — SIGNIFICANT CHANGE UP (ref 6–8.3)
PROT UR-MCNC: NEGATIVE MG/DL — SIGNIFICANT CHANGE UP
PROTHROM AB SERPL-ACNC: 12.4 SEC — SIGNIFICANT CHANGE UP (ref 9.8–12.7)
PROTHROM AB SERPL-ACNC: 12.6 SEC — SIGNIFICANT CHANGE UP (ref 9.8–12.7)
RAPID RVP RESULT: SIGNIFICANT CHANGE UP
RBC # BLD: 4.77 M/UL — SIGNIFICANT CHANGE UP (ref 4.2–5.8)
RBC # BLD: 5.07 M/UL — SIGNIFICANT CHANGE UP (ref 4.2–5.8)
RBC # BLD: 5.27 M/UL — SIGNIFICANT CHANGE UP (ref 4.2–5.8)
RBC # FLD: 12 % — SIGNIFICANT CHANGE UP (ref 10.3–14.5)
RBC CASTS # UR COMP ASSIST: 1 /HPF — SIGNIFICANT CHANGE UP (ref 0–4)
RH IG SCN BLD-IMP: POSITIVE — SIGNIFICANT CHANGE UP
SAO2 % BLDMV: 67 % — SIGNIFICANT CHANGE UP (ref 60–90)
SAO2 % BLDV: 26 % — LOW (ref 67–88)
SODIUM SERPL-SCNC: 140 MMOL/L — SIGNIFICANT CHANGE UP (ref 135–145)
SODIUM SERPL-SCNC: 142 MMOL/L — SIGNIFICANT CHANGE UP (ref 135–145)
SODIUM SERPL-SCNC: 142 MMOL/L — SIGNIFICANT CHANGE UP (ref 135–145)
SP GR SPEC: 1.01 — SIGNIFICANT CHANGE UP (ref 1.01–1.02)
TROPONIN T SERPL-MCNC: 0.69 NG/ML — HIGH (ref 0–0.06)
TROPONIN T SERPL-MCNC: 0.71 NG/ML — HIGH (ref 0–0.06)
TROPONIN T SERPL-MCNC: 0.72 NG/ML — HIGH (ref 0–0.06)
UROBILINOGEN FLD QL: NEGATIVE MG/DL — SIGNIFICANT CHANGE UP
WBC # BLD: 14.6 K/UL — HIGH (ref 3.8–10.5)
WBC # BLD: 16.4 K/UL — HIGH (ref 3.8–10.5)
WBC # BLD: 19.6 K/UL — HIGH (ref 3.8–10.5)
WBC # FLD AUTO: 14.6 K/UL — HIGH (ref 3.8–10.5)
WBC # FLD AUTO: 16.4 K/UL — HIGH (ref 3.8–10.5)
WBC # FLD AUTO: 19.6 K/UL — HIGH (ref 3.8–10.5)
WBC UR QL: 0 /HPF — SIGNIFICANT CHANGE UP (ref 0–5)

## 2018-04-28 PROCEDURE — 99152 MOD SED SAME PHYS/QHP 5/>YRS: CPT

## 2018-04-28 PROCEDURE — 71045 X-RAY EXAM CHEST 1 VIEW: CPT | Mod: 26

## 2018-04-28 PROCEDURE — 99291 CRITICAL CARE FIRST HOUR: CPT

## 2018-04-28 PROCEDURE — 99223 1ST HOSP IP/OBS HIGH 75: CPT | Mod: 25,GC

## 2018-04-28 PROCEDURE — 93010 ELECTROCARDIOGRAM REPORT: CPT

## 2018-04-28 PROCEDURE — 93306 TTE W/DOPPLER COMPLETE: CPT | Mod: 26

## 2018-04-28 PROCEDURE — 93460 R&L HRT ART/VENTRICLE ANGIO: CPT | Mod: 26

## 2018-04-28 PROCEDURE — 93308 TTE F-UP OR LMTD: CPT | Mod: 26

## 2018-04-28 RX ORDER — HEPARIN SODIUM 5000 [USP'U]/ML
5600 INJECTION INTRAVENOUS; SUBCUTANEOUS EVERY 6 HOURS
Qty: 0 | Refills: 0 | Status: DISCONTINUED | OUTPATIENT
Start: 2018-04-28 | End: 2018-04-28

## 2018-04-28 RX ORDER — PIPERACILLIN AND TAZOBACTAM 4; .5 G/20ML; G/20ML
3.38 INJECTION, POWDER, LYOPHILIZED, FOR SOLUTION INTRAVENOUS ONCE
Qty: 0 | Refills: 0 | Status: COMPLETED | OUTPATIENT
Start: 2018-04-28 | End: 2018-04-28

## 2018-04-28 RX ORDER — BUPRENORPHINE AND NALOXONE 2; .5 MG/1; MG/1
1 TABLET SUBLINGUAL DAILY
Qty: 0 | Refills: 0 | Status: DISCONTINUED | OUTPATIENT
Start: 2018-04-28 | End: 2018-05-01

## 2018-04-28 RX ORDER — COLCHICINE 0.6 MG
0.6 TABLET ORAL
Qty: 0 | Refills: 0 | Status: DISCONTINUED | OUTPATIENT
Start: 2018-04-28 | End: 2018-04-28

## 2018-04-28 RX ORDER — CLOPIDOGREL BISULFATE 75 MG/1
75 TABLET, FILM COATED ORAL DAILY
Qty: 0 | Refills: 0 | Status: DISCONTINUED | OUTPATIENT
Start: 2018-04-28 | End: 2018-04-28

## 2018-04-28 RX ORDER — PIPERACILLIN AND TAZOBACTAM 4; .5 G/20ML; G/20ML
3.38 INJECTION, POWDER, LYOPHILIZED, FOR SOLUTION INTRAVENOUS EVERY 8 HOURS
Qty: 0 | Refills: 0 | Status: DISCONTINUED | OUTPATIENT
Start: 2018-04-28 | End: 2018-04-30

## 2018-04-28 RX ORDER — SODIUM CHLORIDE 9 MG/ML
3 INJECTION INTRAMUSCULAR; INTRAVENOUS; SUBCUTANEOUS ONCE
Qty: 0 | Refills: 0 | Status: COMPLETED | OUTPATIENT
Start: 2018-04-28 | End: 2018-04-28

## 2018-04-28 RX ORDER — FUROSEMIDE 40 MG
20 TABLET ORAL ONCE
Qty: 0 | Refills: 0 | Status: COMPLETED | OUTPATIENT
Start: 2018-04-28 | End: 2018-04-28

## 2018-04-28 RX ORDER — DOBUTAMINE HCL 250MG/20ML
5 VIAL (ML) INTRAVENOUS
Qty: 500 | Refills: 0 | Status: DISCONTINUED | OUTPATIENT
Start: 2018-04-28 | End: 2018-04-28

## 2018-04-28 RX ORDER — ACETAMINOPHEN 500 MG
975 TABLET ORAL ONCE
Qty: 0 | Refills: 0 | Status: COMPLETED | OUTPATIENT
Start: 2018-04-28 | End: 2018-04-28

## 2018-04-28 RX ORDER — POTASSIUM CHLORIDE 20 MEQ
40 PACKET (EA) ORAL ONCE
Qty: 0 | Refills: 0 | Status: COMPLETED | OUTPATIENT
Start: 2018-04-28 | End: 2018-04-28

## 2018-04-28 RX ORDER — HEPARIN SODIUM 5000 [USP'U]/ML
INJECTION INTRAVENOUS; SUBCUTANEOUS
Qty: 25000 | Refills: 0 | Status: DISCONTINUED | OUTPATIENT
Start: 2018-04-28 | End: 2018-04-28

## 2018-04-28 RX ORDER — ATORVASTATIN CALCIUM 80 MG/1
80 TABLET, FILM COATED ORAL AT BEDTIME
Qty: 0 | Refills: 0 | Status: DISCONTINUED | OUTPATIENT
Start: 2018-04-28 | End: 2018-04-28

## 2018-04-28 RX ORDER — INFLUENZA VIRUS VACCINE 15; 15; 15; 15 UG/.5ML; UG/.5ML; UG/.5ML; UG/.5ML
0.5 SUSPENSION INTRAMUSCULAR ONCE
Qty: 0 | Refills: 0 | Status: DISCONTINUED | OUTPATIENT
Start: 2018-04-28 | End: 2018-05-01

## 2018-04-28 RX ORDER — ASPIRIN/CALCIUM CARB/MAGNESIUM 324 MG
650 TABLET ORAL THREE TIMES A DAY
Qty: 0 | Refills: 0 | Status: DISCONTINUED | OUTPATIENT
Start: 2018-04-28 | End: 2018-04-28

## 2018-04-28 RX ORDER — HEPARIN SODIUM 5000 [USP'U]/ML
6000 INJECTION INTRAVENOUS; SUBCUTANEOUS EVERY 6 HOURS
Qty: 0 | Refills: 0 | Status: DISCONTINUED | OUTPATIENT
Start: 2018-04-28 | End: 2018-04-28

## 2018-04-28 RX ORDER — HEPARIN SODIUM 5000 [USP'U]/ML
5000 INJECTION INTRAVENOUS; SUBCUTANEOUS EVERY 8 HOURS
Qty: 0 | Refills: 0 | Status: DISCONTINUED | OUTPATIENT
Start: 2018-04-28 | End: 2018-05-01

## 2018-04-28 RX ORDER — VANCOMYCIN HCL 1 G
1000 VIAL (EA) INTRAVENOUS EVERY 12 HOURS
Qty: 0 | Refills: 0 | Status: DISCONTINUED | OUTPATIENT
Start: 2018-04-28 | End: 2018-04-30

## 2018-04-28 RX ORDER — PANTOPRAZOLE SODIUM 20 MG/1
40 TABLET, DELAYED RELEASE ORAL
Qty: 0 | Refills: 0 | Status: DISCONTINUED | OUTPATIENT
Start: 2018-04-28 | End: 2018-04-28

## 2018-04-28 RX ADMIN — BUPRENORPHINE AND NALOXONE 1 TABLET(S): 2; .5 TABLET SUBLINGUAL at 21:04

## 2018-04-28 RX ADMIN — Medication 20 MILLIGRAM(S): at 12:52

## 2018-04-28 RX ADMIN — Medication 975 MILLIGRAM(S): at 14:13

## 2018-04-28 RX ADMIN — Medication 20 MILLIGRAM(S): at 16:50

## 2018-04-28 RX ADMIN — SODIUM CHLORIDE 3 MILLILITER(S): 9 INJECTION INTRAMUSCULAR; INTRAVENOUS; SUBCUTANEOUS at 12:11

## 2018-04-28 RX ADMIN — BUPRENORPHINE AND NALOXONE 1 TABLET(S): 2; .5 TABLET SUBLINGUAL at 00:00

## 2018-04-28 RX ADMIN — PIPERACILLIN AND TAZOBACTAM 25 GRAM(S): 4; .5 INJECTION, POWDER, LYOPHILIZED, FOR SOLUTION INTRAVENOUS at 21:04

## 2018-04-28 RX ADMIN — HEPARIN SODIUM 1000 UNIT(S)/HR: 5000 INJECTION INTRAVENOUS; SUBCUTANEOUS at 13:17

## 2018-04-28 RX ADMIN — Medication 0.6 MILLIGRAM(S): at 16:51

## 2018-04-28 RX ADMIN — Medication 40 MILLIEQUIVALENT(S): at 22:29

## 2018-04-28 RX ADMIN — PIPERACILLIN AND TAZOBACTAM 200 GRAM(S): 4; .5 INJECTION, POWDER, LYOPHILIZED, FOR SOLUTION INTRAVENOUS at 14:13

## 2018-04-28 RX ADMIN — Medication 250 MILLIGRAM(S): at 16:52

## 2018-04-28 NOTE — ED PROVIDER NOTE - OBJECTIVE STATEMENT
39yo male transfer from OSH for chest pain and sob. Pt. on suboxone, concern for flash pulm edema. xray showed b/l infiltrates, started on bipap last night, but started vomiting,. Desat to 80's, improved on NRB. heparin started after troponin elevated. Pt. given fluids due to hypotension. 37yo male transfer from OSH for chest pain and sob. Pt. on suboxone and narcan for opiod dependence. Last night OSH had concern for flash pulm edema with xray showing b/l infiltrates, started on bipap last night, but started vomiting,. Desat to 80's, improved on NRB. heparin started after troponin elevated. Pt. given fluids due to hypotension. On arrival in ED, patient reporting shortness of breath, O2sat 88% on RA, mildly improved with nasal cannula. Denies cp, abdominal pain, weakness, fevers, recent illnesses.

## 2018-04-28 NOTE — CHART NOTE - NSCHARTNOTEFT_GEN_A_CORE
====================  CCU MIDNIGHT ROUNDS  ====================    JACQUE MAO  [id number]  Patient is a 38y old  Male who presents with a chief complaint of Hypoxic respiratory failure (28 Apr 2018 14:13)    ====================  SUMMARY:  38M with history of opioid dependence now on Suboxone and admission in 11/2017 for PNA who initially presented to OSH with sharp substernal chest pain and dyspnea, transferred to [facility] with cardiac enzyme elevation and diffuse SUSAN on ECG. Later found to be febrile with evidence of volume overload c/w acute HF (EF 25%, RV systolic dysfunction with areas of hypokinesis on TTE). Now s/p cardiac cath with no significant coronary disease. S/p [place] placement and IV diuresis with good response. Differential includes myopericarditis, NICM, stress cardiomyopathy.   ====================        ====================  NEW EVENTS:  ====================        ====================  VITALS (Last 12 hrs):  ====================    T(C): 37 (04-28-18 @ 19:15), Max: 38.3 (04-28-18 @ 14:30)  T(F): 98.6 (04-28-18 @ 19:15), Max: 101 (04-28-18 @ 14:30)  HR: 68 (04-28-18 @ 21:00) (64 - 78)  BP: 95/58 (04-28-18 @ 21:00) (83/54 - 102/68)  BP(mean): 70 (04-28-18 @ 21:00) (61 - 78)  ABP: --  ABP(mean): --  RR: 20 (04-28-18 @ 21:00) (16 - 28)  SpO2: 99% (04-28-18 @ 21:00) (85% - 99%)  Wt(kg): --  CVP(mm Hg): 6 (04-28-18 @ 21:00) (5 - 8)  CVP(cm H2O): --  CO: 6 (04-28-18 @ 20:30) (6 - 6)  CI: 2.7 (04-28-18 @ 20:30) (2.7 - 2.7)  PA: 35/16 (04-28-18 @ 21:00) (35/16 - 49/20)  PA(mean): 24 (04-28-18 @ 21:00) (23 - 31)  PCWP: --  SVR: 838 (04-28-18 @ 20:30) (838 - 838)  PVR: --      I&O's Summary:    28 Apr 2018 07:01  -  28 Apr 2018 22:16  --------------------------------------------------------  IN: 240 mL / OUT: 400 mL / NET: -160 mL        ====================  NEW LABS:  ====================                        13.5   14.6  )-----------( 252      ( 28 Apr 2018 20:14 )             41.3     04-28    142  |  105  |  17  ----------------------------<  116<H>  3.7   |  24  |  0.82    Ca    8.3<L>      28 Apr 2018 20:13  Phos  3.5     04-28  Mg     2.1     04-28    TPro  6.3  /  Alb  3.8  /  TBili  0.8  /  DBili  x   /  AST  86<H>  /  ALT  46<H>  /  AlkPhos  53  04-28    PT/INR - ( 28 Apr 2018 15:47 )   PT: 12.4 sec;   INR: 1.13 ratio  PTT - ( 28 Apr 2018 15:47 )  PTT:48.2 sec    CARDIAC MARKERS ( 28 Apr 2018 20:13 )  x     / 0.72 ng/mL / 734 U/L / x     / 73.1 ng/mL  CARDIAC MARKERS ( 28 Apr 2018 15:47 )  x     / 0.69 ng/mL / 668 U/L / x     / 64.6 ng/mL  CARDIAC MARKERS ( 28 Apr 2018 12:10 )  x     / 0.71 ng/mL / 572 U/L / x     / 53.8 ng/mL      ====================  A/P:  ====================        Roxana Mckeon MD PGY1 ====================  CCU MIDNIGHT ROUNDS  ====================    JACQUE MAO  [id number]  Patient is a 38y old  Male who presents with a chief complaint of Hypoxic respiratory failure (28 Apr 2018 14:13)    ====================  SUMMARY:  38M with history of opioid dependence now on Suboxone and admission in 11/2017 for PNA who initially presented to OSH with sharp substernal chest pain and dyspnea, transferred to [facility] with cardiac enzyme elevation and diffuse SUSAN on ECG. Later found to be febrile with evidence of volume overload c/w acute HF (EF 25%, RV systolic dysfunction with areas of hypokinesis on TTE). Now s/p cardiac cath with no significant coronary disease. S/p [place] placement and IV diuresis with good response. Differential includes myopericarditis, NICM, stress cardiomyopathy.   ====================        ====================  NEW EVENTS:  No new events. Resting comfortably on high-flow O2.   At 2030, CO 4.8, CI 2.24, SVO2 67%. Most recent hemodynamics va [place], CVP 5, PA 39/19/26,   ====================        ====================  VITALS (Last 12 hrs):  ====================    T(C): 37 (04-28-18 @ 19:15), Max: 38.3 (04-28-18 @ 14:30)  T(F): 98.6 (04-28-18 @ 19:15), Max: 101 (04-28-18 @ 14:30)  HR: 68 (04-28-18 @ 21:00) (64 - 78)  BP: 95/58 (04-28-18 @ 21:00) (83/54 - 102/68)  BP(mean): 70 (04-28-18 @ 21:00) (61 - 78)  ABP: --  ABP(mean): --  RR: 20 (04-28-18 @ 21:00) (16 - 28)  SpO2: 99% (04-28-18 @ 21:00) (85% - 99%)  Wt(kg): --  CVP(mm Hg): 6 (04-28-18 @ 21:00) (5 - 8)  CVP(cm H2O): --  CO: 6 (04-28-18 @ 20:30) (6 - 6)  CI: 2.7 (04-28-18 @ 20:30) (2.7 - 2.7)  PA: 35/16 (04-28-18 @ 21:00) (35/16 - 49/20)  PA(mean): 24 (04-28-18 @ 21:00) (23 - 31)  PCWP: --  SVR: 838 (04-28-18 @ 20:30) (838 - 838)  PVR: --      I&O's Summary:    28 Apr 2018 07:01  -  28 Apr 2018 22:16  --------------------------------------------------------  IN: 240 mL / OUT: 400 mL / NET: -160 mL        ====================  NEW LABS:  ====================                        13.5   14.6  )-----------( 252      ( 28 Apr 2018 20:14 )             41.3     04-28    142  |  105  |  17  ----------------------------<  116<H>  3.7   |  24  |  0.82    Ca    8.3<L>      28 Apr 2018 20:13  Phos  3.5     04-28  Mg     2.1     04-28    TPro  6.3  /  Alb  3.8  /  TBili  0.8  /  DBili  x   /  AST  86<H>  /  ALT  46<H>  /  AlkPhos  53  04-28    PT/INR - ( 28 Apr 2018 15:47 )   PT: 12.4 sec;   INR: 1.13 ratio  PTT - ( 28 Apr 2018 15:47 )  PTT:48.2 sec    CARDIAC MARKERS ( 28 Apr 2018 20:13 )  x     / 0.72 ng/mL / 734 U/L / x     / 73.1 ng/mL  CARDIAC MARKERS ( 28 Apr 2018 15:47 )  x     / 0.69 ng/mL / 668 U/L / x     / 64.6 ng/mL  CARDIAC MARKERS ( 28 Apr 2018 12:10 )  x     / 0.71 ng/mL / 572 U/L / x     / 53.8 ng/mL      ====================  A/P:  ====================  - Now off ACS protocol, continue to trend cardiac enzymes  - Continue vanco, Zosyn for concern for infection (fever, leukocytosis), f/u BCx  - IV diuresis PRN for acute HFrEF  - Check TSH, repeat Utox  - Monitor BP, hemodynamics with Fabio Mckeon MD PGY1 ====================  CCU MIDNIGHT ROUNDS  ====================    JACQUE MAO  [id number]  Patient is a 38y old  Male who presents with a chief complaint of Hypoxic respiratory failure (28 Apr 2018 14:13)    ====================  SUMMARY:  38M with history of opioid dependence now on Suboxone and admission in 11/2017 for PNA who initially presented to OSH with sharp substernal chest pain and dyspnea, transferred to [facility] with cardiac enzyme elevation and diffuse T wave abnormalities on ECG. Later found to be febrile with evidence of volume overload c/w acute HF (EF 25%, RV systolic dysfunction with areas of hypokinesis on TTE). Now s/p cardiac cath with no significant coronary disease. S/p [place] placement and IV diuresis with good response. Differential includes myocarditis, NICM 2/2 cocaine use, stress cardiomyopathy.   ====================        ====================  NEW EVENTS:  No new events. Resting comfortably on high-flow O2.   At 2030, CO 4.8, CI 2.24, SVO2 67%. Most recent hemodynamics va [place], CVP 7, PA 38/16/23, PCWP 16, SVO2 67%.   ====================        ====================  VITALS (Last 12 hrs):  ====================    T(C): 37 (04-28-18 @ 19:15), Max: 38.3 (04-28-18 @ 14:30)  T(F): 98.6 (04-28-18 @ 19:15), Max: 101 (04-28-18 @ 14:30)  HR: 68 (04-28-18 @ 21:00) (64 - 78)  BP: 95/58 (04-28-18 @ 21:00) (83/54 - 102/68)  BP(mean): 70 (04-28-18 @ 21:00) (61 - 78)  ABP: --  ABP(mean): --  RR: 20 (04-28-18 @ 21:00) (16 - 28)  SpO2: 99% (04-28-18 @ 21:00) (85% - 99%)  Wt(kg): --  CVP(mm Hg): 6 (04-28-18 @ 21:00) (5 - 8)  CVP(cm H2O): --  CO: 6 (04-28-18 @ 20:30) (6 - 6)  CI: 2.7 (04-28-18 @ 20:30) (2.7 - 2.7)  PA: 35/16 (04-28-18 @ 21:00) (35/16 - 49/20)  PA(mean): 24 (04-28-18 @ 21:00) (23 - 31)  PCWP: --  SVR: 838 (04-28-18 @ 20:30) (838 - 838)  PVR: --      I&O's Summary:    28 Apr 2018 07:01  -  28 Apr 2018 22:16  --------------------------------------------------------  IN: 240 mL / OUT: 400 mL / NET: -160 mL        ====================  NEW LABS:  ====================                        13.5   14.6  )-----------( 252      ( 28 Apr 2018 20:14 )             41.3     04-28    142  |  105  |  17  ----------------------------<  116<H>  3.7   |  24  |  0.82    Ca    8.3<L>      28 Apr 2018 20:13  Phos  3.5     04-28  Mg     2.1     04-28    TPro  6.3  /  Alb  3.8  /  TBili  0.8  /  DBili  x   /  AST  86<H>  /  ALT  46<H>  /  AlkPhos  53  04-28    PT/INR - ( 28 Apr 2018 15:47 )   PT: 12.4 sec;   INR: 1.13 ratio  PTT - ( 28 Apr 2018 15:47 )  PTT:48.2 sec    CARDIAC MARKERS ( 28 Apr 2018 20:13 )  x     / 0.72 ng/mL / 734 U/L / x     / 73.1 ng/mL  CARDIAC MARKERS ( 28 Apr 2018 15:47 )  x     / 0.69 ng/mL / 668 U/L / x     / 64.6 ng/mL  CARDIAC MARKERS ( 28 Apr 2018 12:10 )  x     / 0.71 ng/mL / 572 U/L / x     / 53.8 ng/mL      ====================  A/P:  ====================  - Continue to trend cardiac enzymes, serial ECGs -- peaked T waves resolving, enzymes uptrending  - Continue Jignesh lange for concern for infection (fever, leukocytosis), f/u BCx, UCx  - IV diuresis PRN for acute HFrEF -- will give IV Lasix 20mg now for evidence of overload on exam (S3 gallop, +JVD)  - TSH, repeat Utox pending  - Monitor BP, hemodynamics with Fabio Mckeon MD PGY1 ====================  CCU MIDNIGHT ROUNDS  ====================    JACQUE MAO  [id number]  Patient is a 38y old  Male who presents with a chief complaint of Hypoxic respiratory failure (28 Apr 2018 14:13)    ====================  SUMMARY:  38M with history of opioid dependence now on Suboxone and admission in 11/2017 for PNA who initially presented to OSH with sharp substernal chest pain and dyspnea, transferred to [facility] with cardiac enzyme elevation and diffuse T wave abnormalities on ECG. Later found to be febrile with evidence of volume overload c/w acute HF (EF 25%, RV systolic dysfunction with areas of hypokinesis on TTE). Now s/p cardiac cath with no significant coronary disease. S/p [place] placement and IV diuresis with good response. Differential includes myocarditis, NICM 2/2 cocaine use, stress cardiomyopathy.   ====================        ====================  NEW EVENTS:  No new events. Resting comfortably on high-flow O2.   Most recent hemodynamics via [place], CVP 7, PA 38/16/23, PCWP 16, SVO2 67%, CO 4.8, CI 2.24.   ====================        ====================  VITALS (Last 12 hrs):  ====================    T(C): 37 (04-28-18 @ 19:15), Max: 38.3 (04-28-18 @ 14:30)  T(F): 98.6 (04-28-18 @ 19:15), Max: 101 (04-28-18 @ 14:30)  HR: 68 (04-28-18 @ 21:00) (64 - 78)  BP: 95/58 (04-28-18 @ 21:00) (83/54 - 102/68)  BP(mean): 70 (04-28-18 @ 21:00) (61 - 78)  ABP: --  ABP(mean): --  RR: 20 (04-28-18 @ 21:00) (16 - 28)  SpO2: 99% (04-28-18 @ 21:00) (85% - 99%)  Wt(kg): --  CVP(mm Hg): 6 (04-28-18 @ 21:00) (5 - 8)  CVP(cm H2O): --  CO: 6 (04-28-18 @ 20:30) (6 - 6)  CI: 2.7 (04-28-18 @ 20:30) (2.7 - 2.7)  PA: 35/16 (04-28-18 @ 21:00) (35/16 - 49/20)  PA(mean): 24 (04-28-18 @ 21:00) (23 - 31)  PCWP: --  SVR: 838 (04-28-18 @ 20:30) (838 - 838)  PVR: --      I&O's Summary:    28 Apr 2018 07:01  -  28 Apr 2018 22:16  --------------------------------------------------------  IN: 240 mL / OUT: 400 mL / NET: -160 mL        ====================  NEW LABS:  ====================                        13.5   14.6  )-----------( 252      ( 28 Apr 2018 20:14 )             41.3     04-28    142  |  105  |  17  ----------------------------<  116<H>  3.7   |  24  |  0.82    Ca    8.3<L>      28 Apr 2018 20:13  Phos  3.5     04-28  Mg     2.1     04-28    TPro  6.3  /  Alb  3.8  /  TBili  0.8  /  DBili  x   /  AST  86<H>  /  ALT  46<H>  /  AlkPhos  53  04-28    PT/INR - ( 28 Apr 2018 15:47 )   PT: 12.4 sec;   INR: 1.13 ratio  PTT - ( 28 Apr 2018 15:47 )  PTT:48.2 sec    CARDIAC MARKERS ( 28 Apr 2018 20:13 )  x     / 0.72 ng/mL / 734 U/L / x     / 73.1 ng/mL  CARDIAC MARKERS ( 28 Apr 2018 15:47 )  x     / 0.69 ng/mL / 668 U/L / x     / 64.6 ng/mL  CARDIAC MARKERS ( 28 Apr 2018 12:10 )  x     / 0.71 ng/mL / 572 U/L / x     / 53.8 ng/mL      ====================  A/P:  ====================  - Continue to trend cardiac enzymes, serial ECGs -- peaked T waves resolving, enzymes uptrending  - Continue vancoJoanasyn for concern for infection (fever, leukocytosis), f/u BCx, UCx  - IV diuresis PRN for acute HFrEF -- will give IV Lasix 20mg now for evidence of overload on exam (S3 gallop, +JVD)  - TSH, repeat Utox pending  - Monitor BP, hemodynamics with Fabio Mckeon MD PGY1

## 2018-04-28 NOTE — H&P ADULT - NSHPPHYSICALEXAM_GEN_ALL_CORE
General: Ill appearing, moderate respiratory distress  Eyes: PERRL, EOMI, clear conjunctive bilaterally  HENT: Normal oral mucosa  CV: Regular rate and rhythm, normal S1/S2 w/ no murmur or rub  Pulm: Bibasilar crackles, tachypneic, no accessory muscle use  GI: Soft, non-tender, non-distended  MSK: Atraumatic, normal strength  Skin: Pale, warm, dry  Neuro: Awake, alert, oriented x 4, no focal deficits

## 2018-04-28 NOTE — CONSULT NOTE ADULT - ATTENDING COMMENTS
Note written 4/29  Briefly, 39 y/o M w/ family h/o early CAD, opiate abuse (on Suboxone), prior cocaine use (per pt last use 5 years prior) who presented to an OSH w/ SOB and pleuritic CP that was positional (improved with sitting up) for several days; found to be in respiratory distress with positive troponin so transferred for further management. CXR with pulmonary edema. Initially required Bipap then HFNC to maintain sats. Received ACS protocol and diuretics with some improvement. Labs notable for troponin T 0.6, tox positive for opiates/cocaine. EKG with possible hyperacute TW vs. pericarditis. TTE with EF 25-30%, apical hypokinesis/akinesis with preserved basal function. Underwent RHC/LHC 4/28 which showed nl coronaries and slightly elevated filling pressures with borderline low CI (2.2). Repeat HD show preserved CO. Received diuretics overnight and feels improved now. On exam, JVD approx 5 cm, RRR, no S3 audible, clear lungs, nontender abdomen, no edema, WWP. Labs reviewed - stable BUN/Cr. Trop T downtrending. Overall, stage C HF, NYHA class II-III with possible takotsubo vs. myopericarditis likely exacerbated by substance use (although pt denies).   - currently borderline hypotensive so hold on BB (especially in light of cocaine however can use coreg ultimately)  - maintain net even; hold on diuretics for now as filling pressures normal  - consider d/c'ing abx if cultures negative  - OOB to chair  - discussed case with patient and emphasized importance of abstinence which he understands  - agree with cardiac MRI when stabilized    Will sign off for now; please call with questions.

## 2018-04-28 NOTE — ED ADULT NURSE REASSESSMENT NOTE - NS ED NURSE REASSESS COMMENT FT1
Patient refusing bipap at this time. Patient educated on importance of bipap. Patient states he is claustrophobic and cannot tolerate bipap. ED MD Lela Ambrosio made aware. Patient placed back on nonrebreather. A&OX3. Safety and comfort measures provided. Family at bedside.

## 2018-04-28 NOTE — CONSULT NOTE ADULT - ASSESSMENT
38M w/ no PMHx (prior hospitalization 11/2017 for PNA), Opiate abuse (on Suboxone) who presented to an OSH w/ CP and SOB and was Tx to [facility] w/ + 38M w/ no PMHx (prior hospitalization 11/2017 for PNA), Opiate abuse (on Suboxone) who presented to an OSH w/ CP and SOB and was Tx to [facility] w/ +biomarkers, TW changes (concerning for hyperacute TWs although unchanged on subsequent ECGs). The patient was found to be in acute HFrEF (EF:25%, +RVSD w/ hypokinesis of the mid-apical septum, apex, distal lateral and inferolateral walls). Clinically the pt is hypervolemic w/ signs of end organ hypoperfusion (elevated lactate, transaminitis). He responded well to IV diuresis. DDx includes NICM 2/2 Cocaine use, ICM, Focal Myocarditis, Takotsubo/Stress Cardiomyopathy.    Plan:    1. Acute HFrEF  -After speaking w/ Interventional Cardiology will continue to treat for ACS (w/ Heparin gtt, pt is s/p ASA and Plavix load to day at OSH) and define Coronary Anatomy in the Cath lab now  -[place] in the cath lab  -c/w IV diuretics  -Serial ECGs, [place], Utox, TSH, NO B-blockers give +Utox at OSH for Cocaine    HF will continue to follow    [place] (p): [phone]

## 2018-04-28 NOTE — ED PROVIDER NOTE - MEDICAL DECISION MAKING DETAILS
Attending Hebert: 37 y/o male transferred from [facility] with elevated troponin and pulmonary edema. concern for myocarditis and cardiogenic shock. cards fellow called upon arrival and ekg repeated. no evidence of STEMI. pt transferred on heparin gtt and given plavix at [facility]. pt seen by cards fellow concern for myocarditis and pulmonary edema. unclear cause. will need ccu, likely cath and close monitoring of respiratory status. additionally while in the ed pt found to be febrile. unclear whether PNA present as pt reports worsening sob since discharged months ago with multifocal pna. no h/o immunosuppression. pan cultured and given abx. plan to admit to icu

## 2018-04-28 NOTE — H&P ADULT - NSHPLABSRESULTS_GEN_ALL_CORE
15.1   19.6  )-----------( 268      ( 28 Apr 2018 12:10 )             45.9       04-28    140  |  105  |  20  ----------------------------<  145<H>  5.3   |  22  |  0.93    Ca    8.4      28 Apr 2018 12:10    TPro  6.7  /  Alb  4.1  /  TBili  0.7  /  DBili  x   /  AST  73<H>  /  ALT  51<H>  /  AlkPhos  60  04-28      LIVER FUNCTIONS - ( 28 Apr 2018 12:10 )  Alb: 4.1 g/dL / Pro: 6.7 g/dL / ALK PHOS: 60 U/L / ALT: 51 U/L / AST: 73 U/L / GGT: x             Creatine Kinase, Serum: 572 U/L (04-28-18 @ 12:10)  Troponin T, Serum: 0.71 ng/mL (04-28-18 @ 12:10)      PT/INR - ( 28 Apr 2018 12:10 )   PT: 12.6 sec;   INR: 1.15 ratio         PTT - ( 28 Apr 2018 12:10 )  PTT:108.7 sec

## 2018-04-28 NOTE — H&P ADULT - NSHPREVIEWOFSYSTEMS_GEN_ALL_CORE
General: + Fever, malaise  Pulm: + Cough, shortness of breath  CV: + Chest pain, no palpitations, syncope  GI: + Nausea, no abdominal pain, vomiting, diarrhea  ID: No sick contacts, travel, exposures

## 2018-04-28 NOTE — CONSULT NOTE ADULT - SUBJECTIVE AND OBJECTIVE BOX
Patient seen and evaluated at bedside in CCU Bed 7    No outpatient Cardiologist    Chief Complaint: Shortness of breath x hours    HPI: 38M w/ no PMHx (prior hospitalization 11/2017 for PNA), Opiate abuse (on Suboxone) who presented to an OSH w/ SOB. The patient was in his usual state of health on the day PTA and took his Suboxone late in the day along w/ 4 beers. He was woken up from sleep with CP that was worse with inspiration and laying flat, 8/10 at the worst and SOB - he stated he, "couldn't breath" He was coughing (this had been going on for 4d prior) w/ emesis. Also w/ LE edema. The patient went to an OSH Trop I 2 --> 4, UTox + for cocaine, WBC:17, AST/ALT: 83/61. The pt was given , Plavix 600 and Heparin IV bolus then gtt    In the [facility] ED the patient was given Lasix 20 IV, a STAT TTE was done (official read pending). Tm:101    The patient reports Cocaine use 5 years ago - he denies recent use. He also reports drinking 1 to 2 times per week - 4 beers maximin He often goes 1-2 weeks without EtOH. He note some SOB since his Dx of PNA. Also recently had Shingles.    PMHx:   Anxiety  Chronic pain (following a surgery for carpal tunnel and ulnar nerve pain)    PSHx: H/O carpal tunnel repair    Allergies: Tylenol Cold &amp; Flu Daytime (Short breath)    Home Meds: Suboxone    Current Medications:   furosemide   Injectable 20 milliGRAM(s) IV Push once  influenza   Vaccine 0.5 milliLiter(s) IntraMuscular once  pantoprazole    Tablet 40 milliGRAM(s) Oral before breakfast  piperacillin/tazobactam IVPB. 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 , has a   Smoking History: Denied  Alcohol Use: Occasional (see HPI)  Drug Use: Last Cocaine use was 5yrs ago per patient, denied marijuanan use or IVDU    REVIEW OF SYSTEMS:  CONSTITUTIONAL: No weakness, fevers or chills, no sick contacts, no tic bits  EYES/ENT: No visual changes;  No dysphagia  NECK: No pain or stiffness  RESPIRATORY: + cough, no wheezing, hemoptysis; + shortness of breath  CARDIOVASCULAR: + chest pain, no palpitations; + lower extremity edema  GASTROINTESTINAL: No abdominal or epigastric pain. No nausea, vomiting, or hematemesis; No diarrhea or constipation. No melena or hematochezia.  BACK: No back pain  GENITOURINARY: No dysuria, frequency or hematuria  NEUROLOGICAL: No numbness or weakness  SKIN: No itching, burning, rashes, or lesions   All other review of systems is negative unless indicated above.    Physical Exam:  T(F): 99.1 (04-28), Max: 101 (04-28)  HR: 68 (04-28) (64 - 78)  BP: 96/54 (04-28) (83/54 - 102/68)  RR: 20 (04-28)  SpO2: 97% on high flow NC  GENERAL: Pt breathing comfortably on high flow, no accessory muscle use, speaking in full sentences  HEAD:  Atraumatic, Normocephalic  ENT: EOMI, PERRLA, conjunctiva and sclera clear, Neck supple, JVP elevated 5cm above the sternal notch with the patient at 45 degrees, +HJR, moist mucosa  CHEST/LUNG: Rales to mid lung fields   BACK: No spinal tenderness  HEART: Regular rate and rhythm; No murmurs, rubs, or gallops, PMI not displaced  ABDOMEN: Soft, Nontender, Nondistended; Bowel sounds present  EXTREMITIES:  No clubbing, cyanosis, 1+ b/l LE edema, LE warm to touch  PSYCH: Nl behavior, nl affect  NEUROLOGY: AAOx3, non-focal, cranial nerves intact  SKIN: Normal color, No rashes or lesions  LINES: PIV    Cardiovascular Diagnostic Testing:    ECG: Personally reviewed: NSR @ 75, nl axis, peaked TW in V2-V6 w/ J point elevations in precordium    Echo: Personally reviewed: < from: Transthoracic Echocardiogram (11.08.17 @ 19:23) >  Dimensions:    Normal Values:  LA:     3.9    2.0 - 4.0 cm  Ao:     3.3    2.0 - 3.8 cm  SEPTUM: 0.7    0.6 - 1.2 cm  PWT:    0.8    0.6 - 1.1 cm  LVIDd:  5.1    3.0 - 5.6 cm  LVIDs:  3.6    1.8 - 4.0 cm  Derived variables:  LVMI: 58 g/m2  RWT: 0.31  Fractional short: 29 %  EF (Teicholtz): 56 %  Doppler Peak Velocity (m/sec): AoV=1.3  ------------------------------------------------------------------------  Observations: Mitral Valve: Normal mitral valve. Minimal mitral regurgitation. Aortic Valve/Aorta: Calcified trileaflet aortic valve with normal opening. Peak left ventricular outflow tract gradient equals 6 mm Hg, LVOT velocity time integral equals 23 cm. Aortic Root: 3.3 cm. LVOT diameter: 2.1 cm. Left Atrium: Mild left atrial enlargement.  LA volume index = 36 cc/m2. Left Ventricle: Normal left ventricular systolic function. No segmental wall motion abnormalities. Normal left ventricular internal dimensions and wall thicknesses. Right Heart: Normal right atrium. Normal right ventricular size and function. Normal tricuspid valve. Minimal tricuspid regurgitation. Normal pulmonic valve. Mild pulmonic regurgitation. Pericardium/Pleura: Normal pericardium with no pericardial effusion. Hemodynamic: Estimated right atrial pressure is 8 mm Hg. Estimated right ventricular systolic pressure equals 14 mm Hg, assuming right atrial pressure equals 8 mm Hg, consistent with normal pulmonary pressures.  < end of copied text >    Cath: Pending    Imaging:    CXR: Personally reviewed: pulmonary edema    Labs: Personally reviewed                        14.9   16.4  )-----------( 253      ( 28 Apr 2018 15:47 )             44.0     04-28    140  |  105  |  20  ----------------------------<  145<H>  5.3   |  22  |  0.93    Ca    8.4      28 Apr 2018 12:10    TPro  6.7  /  Alb  4.1  /  TBili  0.7  /  DBili  x   /  AST  73<H>  /  ALT  51<H>  /  AlkPhos  60  04-28    PT/INR - ( 28 Apr 2018 15:47 )   PT: 12.4 sec;   INR: 1.13 ratio    PTT - ( 28 Apr 2018 15:47 )  PTT:48.2 sec    CARDIAC MARKERS ( 28 Apr 2018 12:10 )  x     / 0.71 ng/mL / 572 U/L / x     / 53.8 ng/mL    Serum Pro-Brain Natriuretic Peptide: 1758 pg/mL (04-28 @ 12:10)    Lactate: 2.7 --> 2.4 Patient seen and evaluated at bedside in CCU Bed 7    No outpatient Cardiologist    Chief Complaint: Shortness of breath x hours    HPI: 38M w/ no PMHx (prior hospitalization 11/2017 for PNA), Opiate abuse (on Suboxone) who presented to an OSH w/ SOB. The patient was in his usual state of health on the day PTA and took his Suboxone late in the day along w/ 4 beers. He was woken up from sleep with CP that was worse with inspiration and laying flat, 8/10 at the worst and SOB - he stated he, "couldn't breath" He was coughing (this had been going on for 4d prior) w/ emesis. Also w/ LE edema. The patient went to an OSH Trop I 2 --> 4, UTox + for cocaine, WBC:17, AST/ALT: 83/61. The pt was given , Plavix 600 and Heparin IV bolus then gtt    In the [facility] the patient was given Lasix 20 IV, a STAT TTE was done (official read pending). Tm:101    The patient reports Cocaine use 5 years ago - he denies recent use. He also reports drinking 1 to 2 times per week - 4 beers maximin He often goes 1-2 weeks without EtOH. He note some SOB since his Dx of PNA. Also recently had Shingles.    PMHx:   Anxiety  Chronic pain (following a surgery for carpal tunnel and ulnar nerve pain)    PSHx: H/O carpal tunnel repair    Allergies: Tylenol Cold &amp; Flu Daytime (Short breath)    Home Meds: Suboxone    Current Medications:   furosemide   Injectable 20 milliGRAM(s) IV Push once  influenza   Vaccine 0.5 milliLiter(s) IntraMuscular once  pantoprazole    Tablet 40 milliGRAM(s) Oral before breakfast  piperacillin/tazobactam IVPB. 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 , has a   Smoking History: Denied  Alcohol Use: Occasional (see HPI)  Drug Use: Last Cocaine use was 5yrs ago per patient, denied marijuanan use or IVDU    REVIEW OF SYSTEMS:  CONSTITUTIONAL: No weakness, fevers or chills, no sick contacts, no tic bits  EYES/ENT: No visual changes;  No dysphagia  NECK: No pain or stiffness  RESPIRATORY: + cough, no wheezing, hemoptysis; + shortness of breath  CARDIOVASCULAR: + chest pain, no palpitations; + lower extremity edema  GASTROINTESTINAL: No abdominal or epigastric pain. No nausea, vomiting, or hematemesis; No diarrhea or constipation. No melena or hematochezia.  BACK: No back pain  GENITOURINARY: No dysuria, frequency or hematuria  NEUROLOGICAL: No numbness or weakness  SKIN: No itching, burning, rashes, or lesions   All other review of systems is negative unless indicated above.    Physical Exam:  T(F): 99.1 (04-28), Max: 101 (04-28)  HR: 68 (04-28) (64 - 78)  BP: 96/54 (04-28) (83/54 - 102/68)  RR: 20 (04-28)  SpO2: 97% on high flow NC  GENERAL: Pt breathing comfortably on high flow, no accessory muscle use, speaking in full sentences  HEAD:  Atraumatic, Normocephalic  ENT: EOMI, PERRLA, conjunctiva and sclera clear, Neck supple, JVP elevated 5cm above the sternal notch with the patient at 45 degrees, +HJR, moist mucosa  CHEST/LUNG: Rales to mid lung fields   BACK: No spinal tenderness  HEART: Regular rate and rhythm; No murmurs, rubs, or gallops, PMI not displaced  ABDOMEN: Soft, Nontender, Nondistended; Bowel sounds present  EXTREMITIES:  No clubbing, cyanosis, 1+ b/l LE edema, LE warm to touch  PSYCH: Nl behavior, nl affect  NEUROLOGY: AAOx3, non-focal, cranial nerves intact  SKIN: Normal color, No rashes or lesions  LINES: PIV    Cardiovascular Diagnostic Testing:    ECG: Personally reviewed: NSR @ 75, nl axis, peaked TW in V2-V6 w/ J point elevations in precordium    Echo < from: TTE with Doppler (w/Cont) (04.28.18 @ 12:48) >  Dimensions:    Normal Values:  LA:     3.9    2.0 - 4.0 cm  Ao:     2.4    2.0 - 3.8 cm  SEPTUM: 0.8    0.6 - 1.2 cm  PWT:    0.8    0.6 - 1.1 cm  LVIDd:  4.8    3.0 - 5.6 cm  LVIDs:  3.5    1.8 - 4.0 cm  Derived variables:  LVMI: 59 g/m2  RWT: 0.33  Fractional short: 27 %  EF (Visual Estimate): 25 %  ------------------------------------------------------------------------  Observations: Mitral Valve: Normal mitral valve. Mild mitral regurgitation. Aortic Valve/Aorta: Normal trileaflet aortic valve. LVOT velocity time integral equals 17 cm. Aortic Root: 2.4 cm. LVOT diameter: 1.8 cm. Left Atrium: Mildly dilated left atrium.  LA volume index = 37 cc/m2. Left Ventricle: Severe segmental left ventricular systolic dysfunction.  Akinesis of the mid to apical septum, apex, distal lateral, inferolateral wall.   Endocardial visualization enhanced with intravenous injection of echo contrast (Definity). No left ventricularthrombus. Normal left ventricular internal dimensions and wall thickness. Moderate diastolic dysfunction (Stage II). Right Heart: Normal right atrium. Right ventricular enlargement with decreased right ventricular systolic function. Normal tricuspid valve. Normal pulmonic valve. Pericardium/Pleura: Normal pericardium with no pericardial  effusion. Hemodynamic: Estimated right atrial pressure is 8 mm Hg. Estimated right ventricular systolic pressure equals 31 mm Hg,assuming right atrial pressure equals 8 mm Hg, consistent with normal pulmonary pressures.  < end of copied text >    Echo: Personally reviewed: < from: Transthoracic Echocardiogram (11.08.17 @ 19:23) >  Dimensions:    Normal Values:  LA:     3.9    2.0 - 4.0 cm  Ao:     3.3    2.0 - 3.8 cm  SEPTUM: 0.7    0.6 - 1.2 cm  PWT:    0.8    0.6 - 1.1 cm  LVIDd:  5.1    3.0 - 5.6 cm  LVIDs:  3.6    1.8 - 4.0 cm  Derived variables:  LVMI: 58 g/m2  RWT: 0.31  Fractional short: 29 %  EF (Teicholtz): 56 %  Doppler Peak Velocity (m/sec): AoV=1.3  ------------------------------------------------------------------------  Observations: Mitral Valve: Normal mitral valve. Minimal mitral regurgitation. Aortic Valve/Aorta: Calcified trileaflet aortic valve with normal opening. Peak left ventricular outflow tract gradient equals 6 mm Hg, LVOT velocity time integral equals 23 cm. Aortic Root: 3.3 cm. LVOT diameter: 2.1 cm. Left Atrium: Mild left atrial enlargement.  LA volume index = 36 cc/m2. Left Ventricle: Normal left ventricular systolic function. No segmental wall motion abnormalities. Normal left ventricular internal dimensions and wall thicknesses. Right Heart: Normal right atrium. Normal right ventricular size and function. Normal tricuspid valve. Minimal tricuspid regurgitation. Normal pulmonic valve. Mild pulmonic regurgitation. Pericardium/Pleura: Normal pericardium with no pericardial effusion. Hemodynamic: Estimated right atrial pressure is 8 mm Hg. Estimated right ventricular systolic pressure equals 14 mm Hg, assuming right atrial pressure equals 8 mm Hg, consistent with normal pulmonary pressures.  < end of copied text >    Cath: Pending    Imaging:    CXR: Personally reviewed: pulmonary edema    Labs: Personally reviewed                        14.9   16.4  )-----------( 253      ( 28 Apr 2018 15:47 )             44.0     04-28    140  |  105  |  20  ----------------------------<  145<H>  5.3   |  22  |  0.93    Ca    8.4      28 Apr 2018 12:10    TPro  6.7  /  Alb  4.1  /  TBili  0.7  /  DBili  x   /  AST  73<H>  /  ALT  51<H>  /  AlkPhos  60  04-28    PT/INR - ( 28 Apr 2018 15:47 )   PT: 12.4 sec;   INR: 1.13 ratio    PTT - ( 28 Apr 2018 15:47 )  PTT:48.2 sec    CARDIAC MARKERS ( 28 Apr 2018 12:10 )  x     / 0.71 ng/mL / 572 U/L / x     / 53.8 ng/mL    Serum Pro-Brain Natriuretic Peptide: 1758 pg/mL (04-28 @ 12:10)    Lactate: 2.7 --> 2.4

## 2018-04-28 NOTE — ED PROVIDER NOTE - PROGRESS NOTE DETAILS
Attending Hebert: bedside pocus concerning for apical hypokinesis. cards fellow morenita light arrival. pt chest pain free. evidence of diffuse b lines to suggest pulmonary edema. pt placed on supplemental oxygen Attending Hebert: cards fellow doing compreshensive echo. BP improving. pt on nrb with saturation 100% Attending Ambrosio: temperature performed and 101. cultures ordered as well as abx. d/w cards as pt admitted to ccu. will get ct chest on way to CCU

## 2018-04-28 NOTE — ED ADULT NURSE NOTE - OBJECTIVE STATEMENT
39 y/o male presenting to the ED via EMS transfer from [facility] complaining of chest pain and SOB. Hx of substance abuse. Patient on Suboxone, took Suboxone last night and drank two beers and started feeling chest pain. Per EMS patient noted to have bilateral pulmonary infiltrates on xray done in [facility]. Patient placed on bipap but removed due to nausea. Patient placed on nonrebreather and desat to 80s. On arrival patient on nonrebreather. SPO2 88% on room air, 92% on 4L NC. Patient complaining of SOB. Patient also noted to be on Heparin 1088 units per hour per EMS for elevated troponin. Patient denies numbness, tingling, n/v/d, drug use. Patient speaking in full sentences. Patient maintaining saliva. Bilateral IVs placed in [facility]. A&OX3. Safety and comfort measures provided.

## 2018-04-28 NOTE — H&P ADULT - ASSESSMENT
Healthy 38yom w/ hx of opiate dependence p/w chest pain and shortness of breath, w/ fever, bilateral pulmonary edema, wall motion abnormalities and positive cardiac enzymes, suspicious for a von/pericarditis    Plan:    Neuro  - Awake, alert, no issues  - Analgesia PRN    CV  - Clinical picture more consistent with an infectious process given fever, leukocytosis, and preceding cough, less likely ischemic event  - Dc heparin gtt, plavix  - Start high dose aspirin, colchicine  - F/u official echo read  - NICM work up including HIV, TSH, hepatitis, RVP    Pulm  - Still w/ significant oxygen requirement  - Transition to HFNC to maintain SpO2 >92%  - Bilateral infiltrates could be infectious in nature, continue vanco/zosyn empirically  - F/u RVP    GI  - Reg diet  - PPI prophylaxis due to aspirin      - Trend BUN/Cr  - Monitor electrolytes  - Strict ins and outs    Heme  - Trend CBC  - Heparin SubQ ppx    ID  - Febrile w/ likely infectious myocarditis  - Blood, urine cultures pending  - RVP  - Vanco/zosyn empirically    Endo  - No acute issues  - F/u TSh  - Monitor glucose on chemistries Healthy 38yom w/ hx of opiate dependence p/w chest pain and shortness of breath, w/ fever, bilateral pulmonary edema, wall motion abnormalities and positive cardiac enzymes, suspicious for a von/pericarditis    Plan:    Neuro  - Awake, alert, no issues  - Analgesia PRN    CV  - Clinical picture more consistent with an infectious process given fever, leukocytosis, and preceding cough, less likely ischemic event  - Dc heparin gtt, plavix  - Start high dose aspirin, colchicine  - F/u official echo read    Pulm  - Still w/ significant oxygen requirement  - Transition to HFNC to maintain SpO2 >92%  - Bilateral infiltrates could be infectious in nature, continue vanco/zosyn empirically  - F/u RVP    GI  - Reg diet  - PPI prophylaxis due to aspirin      - Trend BUN/Cr  - Monitor electrolytes  - Strict ins and outs    Heme  - Trend CBC  - Heparin SubQ ppx    ID  - Febrile w/ likely infectious myocarditis  - Blood, urine cultures pending  - RVP  - Vanco/zosyn empirically    Endo  - No acute issues  - F/u TSh  - Monitor glucose on chemistries Healthy 38yom w/ hx of opiate dependence p/w chest pain and shortness of breath, w/ fever, bilateral pulmonary edema, wall motion abnormalities and positive cardiac enzymes, suspicious for a myopericarditis    Plan:    Neuro  - Awake, alert, no issues  - Analgesia PRN    CV  - Clinical picture more consistent with an infectious process given fever, leukocytosis, and preceding cough, less likely ischemic event  - Dc heparin gtt, plavix  - Start high dose aspirin, colchicine  - F/u official echo read    Pulm  - Still w/ significant oxygen requirement  - Transition to HFNC to maintain SpO2 >92%  - Bilateral infiltrates could be infectious in nature, continue vanco/zosyn empirically  - F/u RVP    GI  - Reg diet  - PPI prophylaxis due to aspirin      - Trend BUN/Cr  - Monitor electrolytes  - Strict ins and outs    Heme  - Trend CBC  - Heparin SubQ ppx    ID  - Blood, urine cultures pending  - RVP  - Vanco/zosyn empirically    Endo  - No acute issues  - F/u TSh  - Monitor glucose on chemistries

## 2018-04-28 NOTE — CONSULT NOTE ADULT - SUBJECTIVE AND OBJECTIVE BOX
CHIEF COMPLAINT:    HISTORY OF PRESENT ILLNESS:      Allergies    Tylenol Cold &amp; Flu Daytime (Short breath)    Intolerances    	    MEDICATIONS:  furosemide   Injectable 20 milliGRAM(s) IV Push Once  heparin  Infusion.  Unit(s)/Hr IV Continuous <Continuous>  heparin  Injectable 6000 Unit(s) IV Push every 6 hours PRN                  PAST MEDICAL & SURGICAL HISTORY:  Hyperthyroidism  Anxiety  H/O carpal tunnel repair      FAMILY HISTORY:  Family history of coronary artery bypass graft (Grandparent)      SOCIAL HISTORY:    [ ] Non-smoker  [ ] Smoker  [ ] Alcohol      REVIEW OF SYSTEMS:  General: no fatigue/malaise, weight loss/gain.  Skin: no rashes.  Ophthalmologic: no blurred vision, no loss of vision. 	  ENT: no sore throat, rhinorrhea, sinus congestion.  Respiratory: no SOB, cough or wheeze.  Gastrointestinal:  no N/V/D, no melena/hematemesis/hematochezia.  Genitourinary: no dysuria/hesitancy or hematuria.  Musculoskeletal: no myalgias or arthralgias.  Neurological: no changes in vision or hearing, no lightheadedness/dizziness, no syncope/near syncope	  Psychiatric: no unusual stress/anxiety.   Hematology/Lymphatics: no unusual bleeding, bruising and no lymphadenopathy.  Endocrine: no unusual thirst.   All others negative except as stated above and in HPI.    PHYSICAL EXAM:  T(C): 37.6 (04-28-18 @ 12:09), Max: 37.6 (04-28-18 @ 12:09)  HR: 78 (04-28-18 @ 12:09) (78 - 78)  BP: 90/75 (04-28-18 @ 12:09) (90/75 - 90/75)  RR: 18 (04-28-18 @ 12:10) (18 - 18)  SpO2: 96% (04-28-18 @ 12:10) (88% - 96%)  Wt(kg): --  I&O's Summary      Appearance: Normal	  HEENT:   Normal oral mucosa, PERRL, EOMI	  Lymphatic: No lymphadenopathy  Cardiovascular: Normal S1 S2, No JVD, No murmurs, No edema  Respiratory: Lungs clear to auscultation	  Psychiatry: A & O x 3, Mood & affect appropriate  Gastrointestinal:  Soft, Non-tender, + BS	  Skin: No rashes, No ecchymoses, No cyanosis	  Neurologic: Non-focal  Extremities: Normal range of motion, No clubbing, cyanosis or edema  Vascular: Peripheral pulses palpable 2+ bilaterally        LABS:	 	    CBC Full  -  ( 28 Apr 2018 12:10 )  WBC Count : 19.6 K/uL  Hemoglobin : 15.1 g/dL  Hematocrit : 45.9 %  Platelet Count - Automated : 268 K/uL  Mean Cell Volume : 87.1 fl  Mean Cell Hemoglobin : 28.6 pg  Mean Cell Hemoglobin Concentration : 32.8 gm/dL  Auto Neutrophil # : 17.7 K/uL  Auto Lymphocyte # : 1.0 K/uL  Auto Monocyte # : 0.8 K/uL  Auto Eosinophil # : 0.0 K/uL  Auto Basophil # : 0.0 K/uL  Auto Neutrophil % : 90.4 %  Auto Lymphocyte % : 5.3 %  Auto Monocyte % : 4.2 %  Auto Eosinophil % : 0.1 %  Auto Basophil % : 0.1 %            proBNP:   Lipid Profile:   HgA1c:   TSH:       CARDIAC MARKERS:            TELEMETRY: 	    ECG:  	  RADIOLOGY:  OTHER: 	    PREVIOUS DIAGNOSTIC TESTING:    [ ] Echocardiogram:  [ ]  Catheterization:  [ ] Stress Test:  	  	  ASSESSMENT/PLAN: CHIEF COMPLAINT: dyspnea    HISTORY OF PRESENT ILLNESS:  38M pmhx of opioid dependence now on suboxone, presents with acute dyspnea to [facility], transferred for SUSAN c/f ACS. Patient states last night took his suboxone, then a few bottles of beer, developed acute dyspnea at around 2am, presented to OSH, where CXR showed pulm edema, EKG showed diffuse SUSAN, Initial tropI there 2.2-->4. Notably was cocaine+ utox there. Labs there WBC 17, Cr 0.9, K is 4. Started on hep bolus ggt, plavix 600 and asa 325 and transfered here for concern of pericarditis vs ACS.     On arrival, In ED at [facility], vitals at triage HR 78, BP 97/75, RR 18 w/ SpO2 88% on room air and rectal temp 101. BEdside tte showed apical hypokinesis, formal TTE done at bedside pending read.     Allergies    Tylenol Cold &amp; Flu Daytime (Short breath)    Intolerances    	    MEDICATIONS:  furosemide   Injectable 20 milliGRAM(s) IV Push Once  heparin  Infusion.  Unit(s)/Hr IV Continuous <Continuous>  heparin  Injectable 6000 Unit(s) IV Push every 6 hours PRN                  PAST MEDICAL & SURGICAL HISTORY:  Hyperthyroidism  Anxiety  H/O carpal tunnel repair      FAMILY HISTORY:  Family history of coronary artery bypass graft (Grandparent)      SOCIAL HISTORY:    [ ] Non-smoker  drinks 2 beers per week        REVIEW OF SYSTEMS:  General: no fatigue/malaise, weight loss/gain.  Skin: no rashes.  Ophthalmologic: no blurred vision, no loss of vision. 	  ENT: no sore throat, rhinorrhea, sinus congestion.  Respiratory: no SOB, cough or wheeze.  Gastrointestinal:  no N/V/D, no melena/hematemesis/hematochezia.  Genitourinary: no dysuria/hesitancy or hematuria.  Musculoskeletal: no myalgias or arthralgias.  Neurological: no changes in vision or hearing, no lightheadedness/dizziness, no syncope/near syncope	  Psychiatric: no unusual stress/anxiety.   Hematology/Lymphatics: no unusual bleeding, bruising and no lymphadenopathy.  Endocrine: no unusual thirst.   All others negative except as stated above and in HPI.    PHYSICAL EXAM:  T(C): 37.6 (04-28-18 @ 12:09), Max: 37.6 (04-28-18 @ 12:09)  HR: 78 (04-28-18 @ 12:09) (78 - 78)  BP: 90/75 (04-28-18 @ 12:09) (90/75 - 90/75)  RR: 18 (04-28-18 @ 12:10) (18 - 18)  SpO2: 96% (04-28-18 @ 12:10) (88% - 96%)  Wt(kg): --  I&O's Summary      Appearance: thin tall  HEENT:   Normal oral mucosa, PERRL, EOMI	  Lymphatic: No lymphadenopathy  Cardiovascular: Normal S1 S2, No JVD, +s3  Respiratory: Lungs clear to auscultation	  Psychiatry: A & O x 3, Mood & affect appropriate  Gastrointestinal:  Soft, Non-tender, + BS	  Skin: No rashes, No ecchymoses, No cyanosis	  Neurologic: Non-focal  Extremities: Normal range of motion, No clubbing, cyanosis or edema  Vascular: Peripheral pulses palpable 2+ bilaterally        LABS:	 	    CBC Full  -  ( 28 Apr 2018 12:10 )  WBC Count : 19.6 K/uL  Hemoglobin : 15.1 g/dL  Hematocrit : 45.9 %  Platelet Count - Automated : 268 K/uL  Mean Cell Volume : 87.1 fl  Mean Cell Hemoglobin : 28.6 pg  Mean Cell Hemoglobin Concentration : 32.8 gm/dL  Auto Neutrophil # : 17.7 K/uL  Auto Lymphocyte # : 1.0 K/uL  Auto Monocyte # : 0.8 K/uL  Auto Eosinophil # : 0.0 K/uL  Auto Basophil # : 0.0 K/uL  Auto Neutrophil % : 90.4 %  Auto Lymphocyte % : 5.3 %  Auto Monocyte % : 4.2 %  Auto Eosinophil % : 0.1 %  Auto Basophil % : 0.1 %

## 2018-04-28 NOTE — H&P ADULT - NSHPSOCIALHISTORY_GEN_ALL_CORE
-  w/ kids  - Lives in private home  - Never smoker  - Social alcohol use  - Former prescription narcotic dependence, currently treated w/ suboxone -  w/ kids  - Lives in private home  - Never smoker  - Social alcohol use  - Former prescription narcotic dependence, currently treated w/ suboxone  - Former cocaine user, last use over 5 years ago

## 2018-04-28 NOTE — CONSULT NOTE ADULT - ASSESSMENT
38M pmhx of opioid dependence now on suboxone, presents to OSH with acute dyspnea, with CXR showign pulm edema, +CE and utox +cocaine, EKG diffuse SUSAN, TTE showing apical akinesis, suspect either perimyocarditis vs takotubo vs cocaine induced vasospasm, less likely type 1 STEMI.   - would cont on hep ggt, plav 75 at this time  - cont diuresis, received lasix 20 ivp in the ED  - fu with official echo read  -trend enzymes   - RVP, infectious evaluation.   -admit to ccu  - hold on cath for now given low suspicion for plaque rupture, d/w int atdg Dr Martell

## 2018-04-28 NOTE — H&P ADULT - HISTORY OF PRESENT ILLNESS
38yom w/ 38yom w/ hx of prescription opiate dependence, currently in recovery on suboxone presents with shortness of breath. Pt reports several days of a non-productive cough. Last night began having sharp, left sided chest pain, non-radiating, unchanged by movement, exertion, or inspiration that woke him up from sleep, associated w/ significant shortness of breath. Originally presented to [facility] where he was hypoxic to the 80s on room air w/ diffuse bilateral infiltrates concerning for pulmonary edema, associated w/ positive troponin and ST elevations on ECG. Transferred to [facility] for further management. Required BiPAP initially but could not tolerate it, but still requiring non-rebreather to maintain saturations. Aside from cough, denies any fevers, chills, weakness or other recent illness. No sick contacts or travel. Denies any other substance use or IV drug use.     In ED at [facility], vitals at triage HR 78, BP 97/75, RR 18 w/ SpO2 88% on room air and rectal temp 101. Chest pain has since resolved but still short of breath requiring non-rebreather. Labs remarkable for WBC 19 w/ left shift, troponin T 0.71 and pro-BNP 1758. Bedside echocardiogram revealed apical hypokinesis w/ diffuse b-lines bilaterally. Admitted to CCU for management of hypoxic respiratory failure secondary to possible myocarditis. 38yom w/ hx of prescription opiate dependence, currently in recovery on suboxone presents with shortness of breath. Pt reports several days of a non-productive cough. Last night began having sharp, mid sternal chest pain, non-radiating, unchanged by movement, exertion, or inspiration but improved with sitting forward that woke him up from sleep, associated w/ significant shortness of breath. Originally presented to [facility] where he was hypoxic to the 80s on room air w/ diffuse bilateral infiltrates concerning for pulmonary edema, associated w/ positive troponin and ST elevations on ECG. Received aspirin/plavix load and heparin gtt. Transferred to [facility] for further management. Required BiPAP initially but could not tolerate it, but still requiring non-rebreather to maintain saturations. Aside from cough, denies any fevers, chills, weakness or other recent illness. No sick contacts or travel. Denies any other substance use or IV drug use.     In ED at [facility], vitals at triage HR 78, BP 97/75, RR 18 w/ SpO2 88% on room air and rectal temp 101. Chest pain has since resolved but still short of breath requiring non-rebreather. Labs remarkable for WBC 19 w/ left shift, troponin T 0.71 and pro-BNP 1758. Bedside echocardiogram revealed apical hypokinesis w/ diffuse b-lines bilaterally. Started on heparin gtt, received 20mg lasix IVP, and given dose of zosyn. Admitted to CCU for management of hypoxic respiratory failure secondary to possible myocarditis.

## 2018-04-28 NOTE — ED PROVIDER NOTE - PHYSICAL EXAMINATION
Attending Ambrosio: Gen: NAD, heent: atrauamtic, eomi, perrla, mmm, op pink, uvula midline, neck; nttp, no nuchal rigidity, chest: nttp, no crepitus, cv: rrr,s4 present, lungs: decreased breath sounds at bases, rales, abd: soft, nontender, nondistended, no peritoneal signs, +BS, no guarding, ext: wwp, neg homans, skin: no rash, neuro: awake and alert, following commands, speech clear, sensation and strength intact, no focal deficits Attending Ambrosio: Gen: NAD, heent: atrauamtic, eomi, perrla, mmm, op pink, uvula midline, neck; nttp, no nuchal rigidity, chest: nttp, no crepitus, cv: rrr,+s3 present, lungs: decreased breath sounds at bases, rales, abd: soft, nontender, nondistended, no peritoneal signs, +BS, no guarding, ext: wwp, neg homans, skin: no rash, neuro: awake and alert, following commands, speech clear, sensation and strength intact, no focal deficits

## 2018-04-28 NOTE — ED PROVIDER NOTE - CARE PLAN
Principal Discharge DX:	Myocarditis  Secondary Diagnosis:	Shortness of breath  Secondary Diagnosis:	Pulmonary edema

## 2018-04-29 DIAGNOSIS — I51.4 MYOCARDITIS, UNSPECIFIED: ICD-10-CM

## 2018-04-29 LAB
ALBUMIN SERPL ELPH-MCNC: 3.4 G/DL — SIGNIFICANT CHANGE UP (ref 3.3–5)
ALBUMIN SERPL ELPH-MCNC: 3.8 G/DL — SIGNIFICANT CHANGE UP (ref 3.3–5)
ALP SERPL-CCNC: 48 U/L — SIGNIFICANT CHANGE UP (ref 40–120)
ALP SERPL-CCNC: 49 U/L — SIGNIFICANT CHANGE UP (ref 40–120)
ALT FLD-CCNC: 37 U/L — SIGNIFICANT CHANGE UP (ref 10–45)
ALT FLD-CCNC: 41 U/L — SIGNIFICANT CHANGE UP (ref 10–45)
AMPHET UR-MCNC: NEGATIVE — SIGNIFICANT CHANGE UP
ANION GAP SERPL CALC-SCNC: 14 MMOL/L — SIGNIFICANT CHANGE UP (ref 5–17)
ANION GAP SERPL CALC-SCNC: 14 MMOL/L — SIGNIFICANT CHANGE UP (ref 5–17)
AST SERPL-CCNC: 52 U/L — HIGH (ref 10–40)
AST SERPL-CCNC: 77 U/L — HIGH (ref 10–40)
BARBITURATES UR SCN-MCNC: NEGATIVE — SIGNIFICANT CHANGE UP
BASE EXCESS BLDMV CALC-SCNC: 5.3 MMOL/L — HIGH (ref -3–3)
BENZODIAZ UR-MCNC: POSITIVE
BILIRUB SERPL-MCNC: 0.4 MG/DL — SIGNIFICANT CHANGE UP (ref 0.2–1.2)
BILIRUB SERPL-MCNC: 0.7 MG/DL — SIGNIFICANT CHANGE UP (ref 0.2–1.2)
BUN SERPL-MCNC: 18 MG/DL — SIGNIFICANT CHANGE UP (ref 7–23)
BUN SERPL-MCNC: 18 MG/DL — SIGNIFICANT CHANGE UP (ref 7–23)
CALCIUM SERPL-MCNC: 8.5 MG/DL — SIGNIFICANT CHANGE UP (ref 8.4–10.5)
CALCIUM SERPL-MCNC: 8.5 MG/DL — SIGNIFICANT CHANGE UP (ref 8.4–10.5)
CHLORIDE SERPL-SCNC: 103 MMOL/L — SIGNIFICANT CHANGE UP (ref 96–108)
CHLORIDE SERPL-SCNC: 105 MMOL/L — SIGNIFICANT CHANGE UP (ref 96–108)
CHOLEST SERPL-MCNC: 184 MG/DL — SIGNIFICANT CHANGE UP (ref 10–199)
CK MB BLD-MCNC: 9.2 % — HIGH (ref 0–3.5)
CK MB CFR SERPL CALC: 45.9 NG/ML — HIGH (ref 0–6.7)
CK SERPL-CCNC: 497 U/L — HIGH (ref 30–200)
CO2 BLDMV-SCNC: 32 MMOL/L — HIGH (ref 21–29)
CO2 SERPL-SCNC: 24 MMOL/L — SIGNIFICANT CHANGE UP (ref 22–31)
CO2 SERPL-SCNC: 26 MMOL/L — SIGNIFICANT CHANGE UP (ref 22–31)
COCAINE METAB.OTHER UR-MCNC: POSITIVE
CREAT SERPL-MCNC: 0.83 MG/DL — SIGNIFICANT CHANGE UP (ref 0.5–1.3)
CREAT SERPL-MCNC: 0.88 MG/DL — SIGNIFICANT CHANGE UP (ref 0.5–1.3)
CULTURE RESULTS: NO GROWTH — SIGNIFICANT CHANGE UP
GAS PNL BLDMV: SIGNIFICANT CHANGE UP
GLUCOSE SERPL-MCNC: 106 MG/DL — HIGH (ref 70–99)
GLUCOSE SERPL-MCNC: 86 MG/DL — SIGNIFICANT CHANGE UP (ref 70–99)
HCO3 BLDMV-SCNC: 31 MMOL/L — HIGH (ref 20–28)
HCT VFR BLD CALC: 38.3 % — LOW (ref 39–50)
HDLC SERPL-MCNC: 64 MG/DL — SIGNIFICANT CHANGE UP (ref 40–125)
HGB BLD-MCNC: 13 G/DL — SIGNIFICANT CHANGE UP (ref 13–17)
HOROWITZ INDEX BLDMV+IHG-RTO: 60 — SIGNIFICANT CHANGE UP
LACTATE SERPL-SCNC: 0.7 MMOL/L — SIGNIFICANT CHANGE UP (ref 0.7–2)
LIPID PNL WITH DIRECT LDL SERPL: 110 MG/DL — SIGNIFICANT CHANGE UP
MAGNESIUM SERPL-MCNC: 2.2 MG/DL — SIGNIFICANT CHANGE UP (ref 1.6–2.6)
MAGNESIUM SERPL-MCNC: 2.3 MG/DL — SIGNIFICANT CHANGE UP (ref 1.6–2.6)
MCHC RBC-ENTMCNC: 29.6 PG — SIGNIFICANT CHANGE UP (ref 27–34)
MCHC RBC-ENTMCNC: 34.1 GM/DL — SIGNIFICANT CHANGE UP (ref 32–36)
MCV RBC AUTO: 87 FL — SIGNIFICANT CHANGE UP (ref 80–100)
METHADONE UR-MCNC: NEGATIVE — SIGNIFICANT CHANGE UP
O2 CT VFR BLD CALC: 39 MMHG — SIGNIFICANT CHANGE UP (ref 30–65)
OPIATES UR-MCNC: NEGATIVE — SIGNIFICANT CHANGE UP
OXYCODONE UR-MCNC: POSITIVE
PCO2 BLDMV: 50 MMHG — SIGNIFICANT CHANGE UP (ref 30–65)
PCP SPEC-MCNC: SIGNIFICANT CHANGE UP
PCP UR-MCNC: NEGATIVE — SIGNIFICANT CHANGE UP
PH BLDMV: 7.4 — SIGNIFICANT CHANGE UP (ref 7.32–7.45)
PHOSPHATE SERPL-MCNC: 2.5 MG/DL — SIGNIFICANT CHANGE UP (ref 2.5–4.5)
PHOSPHATE SERPL-MCNC: 3.3 MG/DL — SIGNIFICANT CHANGE UP (ref 2.5–4.5)
PLATELET # BLD AUTO: 221 K/UL — SIGNIFICANT CHANGE UP (ref 150–400)
POTASSIUM SERPL-MCNC: 4 MMOL/L — SIGNIFICANT CHANGE UP (ref 3.5–5.3)
POTASSIUM SERPL-MCNC: 4.1 MMOL/L — SIGNIFICANT CHANGE UP (ref 3.5–5.3)
POTASSIUM SERPL-SCNC: 4 MMOL/L — SIGNIFICANT CHANGE UP (ref 3.5–5.3)
POTASSIUM SERPL-SCNC: 4.1 MMOL/L — SIGNIFICANT CHANGE UP (ref 3.5–5.3)
PROCALCITONIN SERPL-MCNC: 0.13 NG/ML — HIGH (ref 0–0.04)
PROT SERPL-MCNC: 5.9 G/DL — LOW (ref 6–8.3)
PROT SERPL-MCNC: 6.1 G/DL — SIGNIFICANT CHANGE UP (ref 6–8.3)
RBC # BLD: 4.4 M/UL — SIGNIFICANT CHANGE UP (ref 4.2–5.8)
RBC # FLD: 11.9 % — SIGNIFICANT CHANGE UP (ref 10.3–14.5)
SAO2 % BLDMV: 69 % — SIGNIFICANT CHANGE UP (ref 60–90)
SODIUM SERPL-SCNC: 143 MMOL/L — SIGNIFICANT CHANGE UP (ref 135–145)
SODIUM SERPL-SCNC: 143 MMOL/L — SIGNIFICANT CHANGE UP (ref 135–145)
SPECIMEN SOURCE: SIGNIFICANT CHANGE UP
THC UR QL: NEGATIVE — SIGNIFICANT CHANGE UP
TOTAL CHOLESTEROL/HDL RATIO MEASUREMENT: 2.9 RATIO — LOW (ref 3.4–9.6)
TRIGL SERPL-MCNC: 49 MG/DL — SIGNIFICANT CHANGE UP (ref 10–149)
TROPONIN T SERPL-MCNC: 0.6 NG/ML — HIGH (ref 0–0.06)
TSH SERPL-MCNC: 0.56 UIU/ML — SIGNIFICANT CHANGE UP (ref 0.27–4.2)
WBC # BLD: 9.9 K/UL — SIGNIFICANT CHANGE UP (ref 3.8–10.5)
WBC # FLD AUTO: 9.9 K/UL — SIGNIFICANT CHANGE UP (ref 3.8–10.5)

## 2018-04-29 PROCEDURE — 99291 CRITICAL CARE FIRST HOUR: CPT

## 2018-04-29 PROCEDURE — 71045 X-RAY EXAM CHEST 1 VIEW: CPT | Mod: 26

## 2018-04-29 PROCEDURE — 71250 CT THORAX DX C-: CPT | Mod: 26

## 2018-04-29 PROCEDURE — 93010 ELECTROCARDIOGRAM REPORT: CPT

## 2018-04-29 RX ORDER — FUROSEMIDE 40 MG
20 TABLET ORAL ONCE
Qty: 0 | Refills: 0 | Status: COMPLETED | OUTPATIENT
Start: 2018-04-29 | End: 2018-04-29

## 2018-04-29 RX ORDER — SODIUM CHLORIDE 9 MG/ML
250 INJECTION INTRAMUSCULAR; INTRAVENOUS; SUBCUTANEOUS ONCE
Qty: 0 | Refills: 0 | Status: COMPLETED | OUTPATIENT
Start: 2018-04-29 | End: 2018-04-29

## 2018-04-29 RX ADMIN — HEPARIN SODIUM 5000 UNIT(S): 5000 INJECTION INTRAVENOUS; SUBCUTANEOUS at 05:08

## 2018-04-29 RX ADMIN — BUPRENORPHINE AND NALOXONE 1 TABLET(S): 2; .5 TABLET SUBLINGUAL at 11:44

## 2018-04-29 RX ADMIN — PIPERACILLIN AND TAZOBACTAM 25 GRAM(S): 4; .5 INJECTION, POWDER, LYOPHILIZED, FOR SOLUTION INTRAVENOUS at 05:08

## 2018-04-29 RX ADMIN — Medication 250 MILLIGRAM(S): at 05:08

## 2018-04-29 RX ADMIN — PIPERACILLIN AND TAZOBACTAM 25 GRAM(S): 4; .5 INJECTION, POWDER, LYOPHILIZED, FOR SOLUTION INTRAVENOUS at 13:33

## 2018-04-29 RX ADMIN — Medication 250 MILLIGRAM(S): at 17:19

## 2018-04-29 RX ADMIN — HEPARIN SODIUM 5000 UNIT(S): 5000 INJECTION INTRAVENOUS; SUBCUTANEOUS at 13:33

## 2018-04-29 RX ADMIN — SODIUM CHLORIDE 500 MILLILITER(S): 9 INJECTION INTRAMUSCULAR; INTRAVENOUS; SUBCUTANEOUS at 13:39

## 2018-04-29 RX ADMIN — Medication 20 MILLIGRAM(S): at 01:09

## 2018-04-29 RX ADMIN — PIPERACILLIN AND TAZOBACTAM 25 GRAM(S): 4; .5 INJECTION, POWDER, LYOPHILIZED, FOR SOLUTION INTRAVENOUS at 21:51

## 2018-04-29 RX ADMIN — HEPARIN SODIUM 5000 UNIT(S): 5000 INJECTION INTRAVENOUS; SUBCUTANEOUS at 21:50

## 2018-04-29 NOTE — PROGRESS NOTE ADULT - ASSESSMENT
This is a 38 year old man with pasat medical history of prescription opiate dependence, currently in recovery on suboxone presents with shortness of breath. Pt reports several days of a non-productive cough. Last night began having sharp, mid sternal chest pain, non-radiating, unchanged by movement, exertion, or inspiration but improved with sitting forward that woke him up from sleep, associated w/ significant shortness of breath. Originally presented to [facility] where he was hypoxic to the 80s on room air w/ diffuse bilateral infiltrates concerning for pulmonary edema, associated w/ positive troponin and ST elevations on ECG. Received aspirin/plavix load and heparin gtt. Transferred to [facility] for further management. Required BiPAP initially but could not tolerate it, but still requiring non-rebreather to maintain saturations. Aside from cough, denies any fevers, chills, weakness or other recent illness. No sick contacts or travel. Denies any other substance use or IV drug use.     In ED at [facility], vitals at triage HR 78, BP 97/75, RR 18 w/ SpO2 88% on room air and rectal temp 101. Chest pain has since resolved but still short of breath requiring non-rebreather. Labs remarkable for WBC 19 w/ left shift, troponin T 0.71 and pro-BNP 1758. Bedside echocardiogram revealed apical hypokinesis w/ diffuse b-lines bilaterally. Started on heparin gtt, received 20mg lasix IVP, and given dose of zosyn. Admitted to CCU for management of hypoxic respiratory failure secondary to possible myocarditis. S/P Cath showing normal coronaries

## 2018-04-29 NOTE — PROGRESS NOTE ADULT - ATTENDING COMMENTS
Seen/examined. Agree with above.  Mr. Patel is a 38M w/ no PMHx (prior hospitalization 11/2017 for PNA), Opiate abuse (on Suboxone) who presented to an OSH w/ CP and SOB and was Tx to [facility] w/ +biomarkers, TW changes (concerning for hyperacute TWs although unchanged on subsequent ECGs). The patient was found to be in acute HFrEF (EF:25%, +RVSD w/ hypokinesis of the mid-apical septum, apex, distal lateral and inferolateral walls). Given his LV dysfunction and + CE, He went for a LHC – clean coronaries, LVEDP:21, RA:10, RV:40/5, PA:39/17/25, PCWP:16, MVO2:63, CO:4.86, CI:2.26.   Platter numbers reviewed this morning, CVP on low side, s/p diuresis overnight, though his filling pressures seem to have improved.  Most likely dx is a stress induced myopathy or myocarditis.  Ideally, I would like to remove the Platter today, though his BPs remain on the lower side.  Currently on antibiotics for PNA. Plan to check CT chest today  Urine tox pending, hold off on bb given his hypotension and possible cocaine use.  I would consider a cardiac MRI at some point  Will hold off on diuresis given his clinical improvement.    High risk of decompensation. >35 min spent taking care of patient. Seen/examined. Agree with above.  Mr. Patel is a 38M w/ no PMHx (prior hospitalization 11/2017 for PNA), Opiate abuse (on Suboxone) who presented to an OSH w/ CP and SOB and was Tx to [facility] w/ +biomarkers, TW changes (concerning for hyperacute TWs although unchanged on subsequent ECGs). The patient was found to be in acute HFrEF (EF:25%, +RVSD w/ hypokinesis of the mid-apical septum, apex, distal lateral and inferolateral walls). Given his LV dysfunction and + CE, He went for a LHC – clean coronaries, LVEDP:21, RA:10, RV:40/5, PA:39/17/25, PCWP:16, MVO2:63, CO:4.86, CI:2.26.   [place] numbers reviewed this morning, CVP on low side, s/p diuresis overnight, though his filling pressures seem to have improved.  Most likely dx is a stress induced myopathy or myocarditis.  CE have downtrended.  Ideally, I would like to remove the [place] today, though his BPs remain on the lower side.  Currently on antibiotics for PNA. Plan to check CT chest today  Urine tox pending, hold off on bb given his hypotension and possible cocaine use. Ace-inhibitor also on hold.  I would consider a cardiac MRI at some point  Will hold off on diuresis given his clinical improvement.    High risk of decompensation. >35 min spent taking care of patient.

## 2018-04-29 NOTE — PROGRESS NOTE ADULT - SUBJECTIVE AND OBJECTIVE BOX
CHIEF COMPLAINT: Myocarditis, ADHF    INTERVAL HISTORY:  This is a 38 year old man with pasat medical history of prescription opiate dependence, currently in recovery on suboxone presents with shortness of breath. Pt reports several days of a non-productive cough. Last night began having sharp, mid sternal chest pain, non-radiating, unchanged by movement, exertion, or inspiration but improved with sitting forward that woke him up from sleep, associated w/ significant shortness of breath. Originally presented to [facility] where he was hypoxic to the 80s on room air w/ diffuse bilateral infiltrates concerning for pulmonary edema, associated w/ positive troponin and ST elevations on ECG. Received aspirin/plavix load and heparin gtt. Transferred to [facility] for further management. Required BiPAP initially but could not tolerate it, but still requiring non-rebreather to maintain saturations. Aside from cough, denies any fevers, chills, weakness or other recent illness. No sick contacts or travel. Denies any other substance use or IV drug use.     In ED at [facility], vitals at triage HR 78, BP 97/75, RR 18 w/ SpO2 88% on room air and rectal temp 101. Chest pain has since resolved but still short of breath requiring non-rebreather. Labs remarkable for WBC 19 w/ left shift, troponin T 0.71 and pro-BNP 1758. Bedside echocardiogram revealed apical hypokinesis w/ diffuse b-lines bilaterally. Started on heparin gtt, received 20mg lasix IVP, and given dose of zosyn. Admitted to CCU for management of hypoxic respiratory failure secondary to possible myocarditis. S/P Cath showing normal coronaries    REVIEW OF SYSTEMS: Denies CP, SOB, all others negative    MEDICATIONS  (STANDING):  buprenorphine 8 mG/naloxone 2 mG SL  Tablet 1 Tablet(s) SubLingual daily  heparin  Injectable 5000 Unit(s) SubCutaneous every 8 hours  influenza   Vaccine 0.5 milliLiter(s) IntraMuscular once  piperacillin/tazobactam IVPB. 3.375 Gram(s) IV Intermittent every 8 hours  vancomycin  IVPB 1000 milliGRAM(s) IV Intermittent every 12 hours    MEDICATIONS  (PRN):      Objective:  Vital Signs Last 24 Hrs  T(C): 37 (2018 07:00), Max: 38.3 (2018 14:30)  T(F): 98.6 (2018 07:00), Max: 101 (2018 14:30)  HR: 66 (2018 07:00) (64 - 78)  BP: 89/53 (2018 07:00) (83/54 - 102/68)  BP(mean): 65 (2018 07:00) (61 - 85)  RR: 21 (2018 07:00) (16 - 28)  SpO2: 94% (:00) (85% - 99%)  ICU Vital Signs Last 24 Hrs  T(C): 37 (2018 07:00), Max: 38.3 (2018 14:30)  T(F): 98.6 (2018 07:00), Max: 101 (2018 14:30)  HR: 66 (:00) (64 - 78)  BP: 89/53 (2018 07:00) (83/54 - 102/68)  BP(mean): 65 (2018 07:00) (61 - 85)  ABP: --  ABP(mean): --  RR: 21 (2018 07:00) (16 - 28)  SpO2: 94% (:00) (85% - 99%)      Adult Advanced Hemodynamics Last 24 Hrs  CVP(mm Hg): 2 (:00) (2 - 9)  CVP(cm H2O): --  CO: 5.9 (2018 05:00) (5.9 - 6)  CI: 2.7 (2018 05:00) (2.7 - 2.7)  PA: 32/11 (2018 07:00) (31/13 - 49/20)  PA(mean): 20 (2018 07:00) (20 - 31)  PCWP: 18 (2018 05:00) (16 - 18)  SVR: 1029 (2018 05:00) (838 - 1029)  SVRI: 2249 (2018 05:00) (1864 - 2249)  PVR: 135 (2018 05:00) (93 - 135)  PVRI: 295 (2018 05:00) (207 - 295)       @ 07:01  -   @ 07:00  --------------------------------------------------------  IN: 1410 mL / OUT: 1525 mL / NET: -115 mL      Daily Height in cm: 185.42 (2018 15:15)    Daily Weight in k.8 (2018 05:00)    PHYSICAL EXAM:      Constitutional: No acute distress    Neuro: A+OX3    Respiratory: CTA Bilaterally    Cardiovascular: S1S2 RRR    Gastrointestinal: +bs    Extremities: No pedal pulses    Vascular: +2 pedal pulses          TELEMETRY:     EKG:     CARDIAC CATH:     ECHO:    IMAGIN.0   9.9   )-----------( 221      ( 2018 05:01 )             38.3         143  |  103  |  18  ----------------------------<  106<H>  4.0   |  26  |  0.88    Ca    8.5      2018 05:01  Phos  3.3       Mg     2.3         TPro  6.1  /  Alb  3.8  /  TBili  0.7  /  DBili  x   /  AST  77<H>  /  ALT  41  /  AlkPhos  49      LIVER FUNCTIONS - ( 2018 05:01 )  Alb: 3.8 g/dL / Pro: 6.1 g/dL / ALK PHOS: 49 U/L / ALT: 41 U/L / AST: 77 U/L / GGT: x           PT/INR - ( 2018 15:47 )   PT: 12.4 sec;   INR: 1.13 ratio         PTT - ( 2018 15:47 )  PTT:48.2 sec  CKMB Units: 45.9 ng/mL ( @ 05:01)  Creatine Kinase, Serum: 497 U/L ( @ 05:01)  Troponin T, Serum: 0.60 ng/mL ( @ 05:01)  Creatine Kinase, Serum: 734 U/L ( @ 20:13)  CKMB Units: 73.1 ng/mL ( @ 20:13)  Troponin T, Serum: 0.72 ng/mL ( @ 20:13)  Troponin T, Serum: 0.69 ng/mL ( @ 15:47)  Creatine Kinase, Serum: 668 U/L ( @ 15:47)  CKMB Units: 64.6 ng/mL ( @ 15:47)  Creatine Kinase, Serum: 572 U/L ( @ 12:10)  CKMB Units: 53.8 ng/mL ( @ 12:10)  Troponin T, Serum: 0.71 ng/mL ( @ 12:10)      *BLOOD GAS/ARTERIAL/MIXED/VENOUS  *LACTATE  Urinalysis Basic - ( 2018 17:11 )    Color: Yellow / Appearance: Clear / S.013 / pH: x  Gluc: x / Ketone: Negative  / Bili: Negative / Urobili: Negative mg/dL   Blood: x / Protein: Negative mg/dL / Nitrite: Negative   Leuk Esterase: Negative / RBC: 1 /HPF / WBC 0 /HPF   Sq Epi: x / Non Sq Epi: 0 /HPF / Bacteria: Negative        HEALTH ISSUES - PROBLEM Dx:        HEALTH ISSUES - R/O PROBLEM Dx:      John Rosario, CCU NP   # CHIEF COMPLAINT: Myocarditis, ADHF    INTERVAL HISTORY:  This is a 38 year old man with pasat medical history of prescription opiate dependence, currently in recovery on suboxone presents with shortness of breath. Pt reports several days of a non-productive cough. Last night began having sharp, mid sternal chest pain, non-radiating, unchanged by movement, exertion, or inspiration but improved with sitting forward that woke him up from sleep, associated w/ significant shortness of breath. Originally presented to [facility] where he was hypoxic to the 80s on room air w/ diffuse bilateral infiltrates concerning for pulmonary edema, associated w/ positive troponin and ST elevations on ECG. Received aspirin/plavix load and heparin gtt. Transferred to [facility] for further management. Required BiPAP initially but could not tolerate it, but still requiring non-rebreather to maintain saturations. Aside from cough, denies any fevers, chills, weakness or other recent illness. No sick contacts or travel. Denies any other substance use or IV drug use.     In ED at [facility], vitals at triage HR 78, BP 97/75, RR 18 w/ SpO2 88% on room air and rectal temp 101. Chest pain has since resolved but still short of breath requiring non-rebreather. Labs remarkable for WBC 19 w/ left shift, troponin T 0.71 and pro-BNP 1758. Bedside echocardiogram revealed apical hypokinesis w/ diffuse b-lines bilaterally. Started on heparin gtt, received 20mg lasix IVP, and given dose of zosyn. Admitted to CCU for management of hypoxic respiratory failure secondary to possible myocarditis. S/P Cath showing normal coronaries    REVIEW OF SYSTEMS: Denies CP, SOB, all others negative    MEDICATIONS  (STANDING):  buprenorphine 8 mG/naloxone 2 mG SL  Tablet 1 Tablet(s) SubLingual daily  heparin  Injectable 5000 Unit(s) SubCutaneous every 8 hours  influenza   Vaccine 0.5 milliLiter(s) IntraMuscular once  piperacillin/tazobactam IVPB. 3.375 Gram(s) IV Intermittent every 8 hours  vancomycin  IVPB 1000 milliGRAM(s) IV Intermittent every 12 hours    MEDICATIONS  (PRN):      Objective:  Vital Signs Last 24 Hrs  T(C): 37 (2018 07:00), Max: 38.3 (2018 14:30)  T(F): 98.6 (2018 07:00), Max: 101 (2018 14:30)  HR: 66 (2018 07:00) (64 - 78)  BP: 89/53 (2018 07:00) (83/54 - 102/68)  BP(mean): 65 (2018 07:00) (61 - 85)  RR: 21 (2018 07:00) (16 - 28)  SpO2: 94% (:00) (85% - 99%)  ICU Vital Signs Last 24 Hrs  T(C): 37 (2018 07:00), Max: 38.3 (2018 14:30)  T(F): 98.6 (2018 07:00), Max: 101 (2018 14:30)  HR: 66 (:00) (64 - 78)  BP: 89/53 (2018 07:00) (83/54 - 102/68)  BP(mean): 65 (2018 07:00) (61 - 85)  ABP: --  ABP(mean): --  RR: 21 (2018 07:00) (16 - 28)  SpO2: 94% (:00) (85% - 99%)      Adult Advanced Hemodynamics Last 24 Hrs  CVP(mm Hg): 2 (:00) (2 - 9)  CVP(cm H2O): --  CO: 5.9 (2018 05:00) (5.9 - 6)  CI: 2.7 (2018 05:00) (2.7 - 2.7)  PA: 32/11 (2018 07:00) (31/13 - 49/20)  PA(mean): 20 (2018 07:00) (20 - 31)  PCWP: 18 (2018 05:00) (16 - 18)  SVR: 1029 (2018 05:00) (838 - 1029)  SVRI: 2249 (2018 05:00) (1864 - 2249)  PVR: 135 (2018 05:00) (93 - 135)  PVRI: 295 (2018 05:00) (207 - 295)       @ 07:01  -   @ 07:00  --------------------------------------------------------  IN: 1410 mL / OUT: 1525 mL / NET: -115 mL      Daily Height in cm: 185.42 (2018 15:15)    Daily Weight in k.8 (2018 05:00)    PHYSICAL EXAM:      Constitutional: No acute distress    Neuro: A+OX3    Respiratory: CTA Bilaterally    Cardiovascular: S1S2 RRR    Gastrointestinal: +bs    Extremities: No pedal pulses    Vascular: +2 pedal pulses          TELEMETRY: SR    EKG:     CARDIAC CATH:       ECHO:  < from: US TTE 2D F/U, Limited w/o Contrast (ED) (18 @ 13:26) >  Procedure was performed in the Emergency Department by a credentialed   Emergency Medicine Attending Physician    EXAM:  ED TTE LIMITED [id number]      ORDER COMMENTS:      PROCEDURE DATE:  2018    FOCUSED ED ULTRASOUND REPORT          INTERPRETATION:  A focused transthoracic cardiac ultrasound examination   was performed.   No pericardial effusion was present.  Hypokinesis of the apex of the left ventricle  Bilateral diffuse anterior B lines seen  Prominent Right ventricle without evidence ofstrain    IMPRESSION:   No Pericardial Effusion.  Pulmonary edema                CAMERON SEWELL   This document has been electronically signed. 2018  1:27PM    < end of copied text >    < from: TTE with Doppler (w/Cont) (18 @ 12:48) >    Patient name: JACQUE MAO  YOB: 1980   Age: 38 (M)   MR#: [id number]  Study Date: 2018  Location: [place] [address]  DSonographer: [facility]  2nd Sonographer: Demetria Haile M.D.  Study quality: Technically good  Referring Physician: Vernon Portillo MD  Blood Pressure: 107/79 mmHg  Height: 185 cm  Weight: 91 kg  BSA: 2.2 m2  ------------------------------------------------------------------------  PROCEDURE: Transthoracic echocardiogram with 2-D, M-Mode  and complete spectral and color flow Doppler. Verbal  consent was obtained for injection of echo contrast  following a discussion of risks and benefits. Following  intravenous injection of contrast, harmonic imaging was  performed.  INDICATION: Chest pain, unspecified (R07.9)  ------------------------------------------------------------------------  Dimensions:    Normal Values:  LA:     3.9    2.0 - 4.0 cm  Ao:     2.4    2.0 - 3.8 cm  SEPTUM: 0.8    0.6 - 1.2 cm  PWT:    0.8    0.6 - 1.1 cm  LVIDd:  4.8    3.0 - 5.6 cm  LVIDs:  3.5    1.8 - 4.0 cm  Derived variables:  LVMI: 59 g/m2  RWT: 0.33  Fractional short: 27 %  EF (Visual Estimate): 25 %  ------------------------------------------------------------------------  Observations:  Mitral Valve: Normal mitral valve. Mild mitral  regurgitation.  Aortic Valve/Aorta: Normal trileaflet aortic valve. LVOT  velocity time integral equals 17 cm.  Aortic Root: 2.4 cm.  LVOT diameter: 1.8 cm.  Left Atrium: Mildly dilated left atrium.  LA volume index =  37 cc/m2.  Left Ventricle: Severe segmental left ventricular systolic  dysfunction.  Akinesis of the mid to apical septum, apex,  distal lateral, inferolateral wall.   Endocardial  visualization enhanced with intravenous injection of echo  contrast (Definity). No left ventricular thrombus. Normal  left ventricular internal dimensions and wall thickness.  Moderate diastolic dysfunction (Stage II).  Right Heart: Normal right atrium. Right ventricular  enlargement with decreased right ventricular systolic  function. Normal tricuspid valve. Normal pulmonic valve.  Pericardium/Pleura: Normal pericardium with no pericardial  effusion.  Hemodynamic: Estimated right atrial pressure is 8 mm Hg.  Estimated right ventricular systolic pressure equals 31 mm  Hg,assuming right atrial pressure equals 8 mm Hg,  consistent with normal pulmonary pressures.  ------------------------------------------------------------------------  Conclusions:  1. Mildly dilated left atrium.  LA volume index = 37 cc/m2.  2. Normal left ventricular internal dimensions and wall  thickness.  3. Severe segmental left ventricular systolic dysfunction.  Akinesis of the mid to apical septum, apex,  distal  lateral, inferolateral wall.   Endocardial visualization  enhanced with intravenous injection of echo contrast  (Definity). No left ventricular thrombus.  4. Right ventricular enlargement with decreased right  ventricular systolic function.  *** Compared with echocardiogram of 2017, there is a  new wall motion abnormality.  ------------------------------------------------------------------------  Confirmed on  2018 - 16:50:22 by Mitch Boogie M.D.  ------------------------------------------------------------------------    < end of copied text >      IMAGIN.0   9.9   )-----------( 221      ( 2018 05:01 )             38.3     -    143  |  103  |  18  ----------------------------<  106<H>  4.0   |  26  |  0.88    Ca    8.5      2018 05:01  Phos  3.3     -  Mg     2.3         TPro  6.1  /  Alb  3.8  /  TBili  0.7  /  DBili  x   /  AST  77<H>  /  ALT  41  /  AlkPhos  49      LIVER FUNCTIONS - ( 2018 05:01 )  Alb: 3.8 g/dL / Pro: 6.1 g/dL / ALK PHOS: 49 U/L / ALT: 41 U/L / AST: 77 U/L / GGT: x           PT/INR - ( 2018 15:47 )   PT: 12.4 sec;   INR: 1.13 ratio         PTT - ( 2018 15:47 )  PTT:48.2 sec  CKMB Units: 45.9 ng/mL ( @ 05:01)  Creatine Kinase, Serum: 497 U/L ( @ 05:01)  Troponin T, Serum: 0.60 ng/mL ( @ 05:01)  Creatine Kinase, Serum: 734 U/L ( @ 20:13)  CKMB Units: 73.1 ng/mL ( @ 20:13)  Troponin T, Serum: 0.72 ng/mL ( @ 20:13)  Troponin T, Serum: 0.69 ng/mL ( @ 15:47)  Creatine Kinase, Serum: 668 U/L ( @ 15:47)  CKMB Units: 64.6 ng/mL ( @ 15:47)  Creatine Kinase, Serum: 572 U/L ( @ 12:10)  CKMB Units: 53.8 ng/mL ( @ 12:10)  Troponin T, Serum: 0.71 ng/mL ( @ 12:10)      *BLOOD GAS/ARTERIAL/MIXED/VENOUS  *LACTATE  Urinalysis Basic - ( 2018 17:11 )    Color: Yellow / Appearance: Clear / S.013 / pH: x  Gluc: x / Ketone: Negative  / Bili: Negative / Urobili: Negative mg/dL   Blood: x / Protein: Negative mg/dL / Nitrite: Negative   Leuk Esterase: Negative / RBC: 1 /HPF / WBC 0 /HPF   Sq Epi: x / Non Sq Epi: 0 /HPF / Bacteria: Negative        HEALTH ISSUES - PROBLEM Dx:        HEALTH ISSUES - R/O PROBLEM Dx:      John Rosario, MICHELLU NP   #[id number]

## 2018-04-29 NOTE — CHART NOTE - NSCHARTNOTEFT_GEN_A_CORE
====================  CCU MIDNIGHT ROUNDS  ====================    JACQUE PATEL  [id number]    ====================  SUMMARY:  ====================    Mr. Patel is a 38M w/ no PMHx (prior hospitalization 11/2017 for PNA), Opiate abuse (on Suboxone) who presented to an OSH w/ CP and SOB and was Tx to [facility] w/ +biomarkers, TW changes (concerning for hyperacute TWs although unchanged on subsequent ECGs). The patient was found to be in acute HFrEF (EF:25%, +RVSD w/ hypokinesis of the mid-apical septum, apex, distal lateral and inferolateral walls). Given his LV dysfunction and + CE, He went for a [facility] – clean coronaries, LVEDP:21, RA:10, RV:40/5, PA:39/17/25, PCWP:16, MVO2:63, CO:4.86, CI:2.26. Concern for stress myopathy/myocarditis    ====================  NEW EVENTS:  ====================        ====================  VITALS (Last 12 hrs):  ====================    T(C): 37.3 (04-29-18 @ 19:00), Max: 37.4 (04-29-18 @ 17:00)  HR: 76 (04-29-18 @ 20:00) (64 - 78)  BP: 103/62 (04-29-18 @ 20:00) (84/55 - 108/70)  BP(mean): 75 (04-29-18 @ 20:00) (63 - 80)  ABP: --  ABP(mean): --  RR: 29 (04-29-18 @ 20:00) (16 - 29)  SpO2: 95% (04-29-18 @ 20:00) (94% - 97%)  Wt(kg): --  CVP(mm Hg): 5 (04-29-18 @ 17:00) (2 - 6)  CO: --  CI: --  PA:  (31/11 - 43/20)  PA(mean): 29 (04-29-18 @ 17:00) (20 - 30)  PA(direct): --  PCWP: --  SVR: --    TELEMETRY:        *BLOOD GAS/ARTERIAL/MIXED/VENOUS  *LACTATE    I&O's Summary    28 Apr 2018 07:01  -  29 Apr 2018 07:00  --------------------------------------------------------  IN: 1410 mL / OUT: 1525 mL / NET: -115 mL    29 Apr 2018 07:01  -  29 Apr 2018 20:56  --------------------------------------------------------  IN: 1080 mL / OUT: 1180 mL / NET: -100 mL        ====================  PLAN:  ====================    HEALTH ISSUES - PROBLEM Dx:  Myocarditis: Myocarditis        HEALTH ISSUES - R/O PROBLEM Dx:      Latasha STODDARD/[facility]  #[id number] ====================  CCU MIDNIGHT ROUNDS  ====================    JACQUE PATEL  [id number]    ====================  SUMMARY:  ====================    Mr. Patel is a 38M w/ no PMHx (prior hospitalization 11/2017 for PNA), Opiate abuse (on Suboxone) who presented to an OSH w/ CP and SOB and was Tx to [facility] w/ +biomarkers, TW changes (concerning for hyperacute TWs although unchanged on subsequent ECGs). The patient was found to be in acute HFrEF (EF:25%, +RVSD w/ hypokinesis of the mid-apical septum, apex, distal lateral and inferolateral walls). Given his LV dysfunction and + CE, He went for a [facility] – clean coronaries, LVEDP:21, RA:10, RV:40/5, PA:39/17/25, PCWP:16, MVO2:63, CO:4.86, CI:2.26. Concern for stress myopathy/myocarditis    ====================  NEW EVENTS:  ====================    given 250 cc bolus s/t soft BP. swan removed, satting well on RA, feels much better. OOB and ambulating     ====================  VITALS (Last 12 hrs):  ====================    T(C): 37.3 (04-29-18 @ 19:00), Max: 37.4 (04-29-18 @ 17:00)  HR: 76 (04-29-18 @ 20:00) (64 - 78)  BP: 103/62 (04-29-18 @ 20:00) (84/55 - 108/70)  BP(mean): 75 (04-29-18 @ 20:00) (63 - 80)  RR: 29 (04-29-18 @ 20:00) (16 - 29)  SpO2: 95% (04-29-18 @ 20:00) (94% - 97%)  CVP(mm Hg): 5 (04-29-18 @ 17:00) (2 - 6)  PA:  (31/11 - 43/20)  PA(mean): 29 (04-29-18 @ 17:00) (20 - 30)    TELEMETRY: sinus     I&O's Summary    28 Apr 2018 07:01  -  29 Apr 2018 07:00  --------------------------------------------------------  IN: 1410 mL / OUT: 1525 mL / NET: -115 mL    29 Apr 2018 07:01  -  29 Apr 2018 20:56  --------------------------------------------------------  IN: 1080 mL / OUT: 1180 mL / NET: -100 mL    ====================  PLAN:  ====================  - HFrEF - suspected stress myopathy vs myocarditis - diurese PRN, satting well on RA @ this time, strict I&Os daily weights, monitor Cr and lytes, add ace for afterload reduction as BP allows   - CT chest w/ PNA vs pulm edema - given cough PTA & leukocytosis on admission, will continue w/ vanco/zosyn @ this time, f/u cultures, vanco trough in AM     Latasha [facility]  #[id number]

## 2018-04-30 LAB
ALBUMIN SERPL ELPH-MCNC: 3.5 G/DL — SIGNIFICANT CHANGE UP (ref 3.3–5)
ALP SERPL-CCNC: 47 U/L — SIGNIFICANT CHANGE UP (ref 40–120)
ALT FLD-CCNC: 35 U/L — SIGNIFICANT CHANGE UP (ref 10–45)
ANION GAP SERPL CALC-SCNC: 10 MMOL/L — SIGNIFICANT CHANGE UP (ref 5–17)
APTT BLD: 28.8 SEC — SIGNIFICANT CHANGE UP (ref 27.5–37.4)
AST SERPL-CCNC: 38 U/L — SIGNIFICANT CHANGE UP (ref 10–40)
BILIRUB SERPL-MCNC: 0.6 MG/DL — SIGNIFICANT CHANGE UP (ref 0.2–1.2)
BUN SERPL-MCNC: 17 MG/DL — SIGNIFICANT CHANGE UP (ref 7–23)
CALCIUM SERPL-MCNC: 9.1 MG/DL — SIGNIFICANT CHANGE UP (ref 8.4–10.5)
CHLORIDE SERPL-SCNC: 107 MMOL/L — SIGNIFICANT CHANGE UP (ref 96–108)
CO2 SERPL-SCNC: 24 MMOL/L — SIGNIFICANT CHANGE UP (ref 22–31)
CREAT SERPL-MCNC: 0.82 MG/DL — SIGNIFICANT CHANGE UP (ref 0.5–1.3)
GLUCOSE SERPL-MCNC: 101 MG/DL — HIGH (ref 70–99)
HCT VFR BLD CALC: 40.2 % — SIGNIFICANT CHANGE UP (ref 39–50)
HGB BLD-MCNC: 13.1 G/DL — SIGNIFICANT CHANGE UP (ref 13–17)
INR BLD: 1.15 RATIO — SIGNIFICANT CHANGE UP (ref 0.88–1.16)
MAGNESIUM SERPL-MCNC: 2.3 MG/DL — SIGNIFICANT CHANGE UP (ref 1.6–2.6)
MCHC RBC-ENTMCNC: 28.5 PG — SIGNIFICANT CHANGE UP (ref 27–34)
MCHC RBC-ENTMCNC: 32.6 GM/DL — SIGNIFICANT CHANGE UP (ref 32–36)
MCV RBC AUTO: 87.5 FL — SIGNIFICANT CHANGE UP (ref 80–100)
PHOSPHATE SERPL-MCNC: 3.1 MG/DL — SIGNIFICANT CHANGE UP (ref 2.5–4.5)
PLATELET # BLD AUTO: 214 K/UL — SIGNIFICANT CHANGE UP (ref 150–400)
POTASSIUM SERPL-MCNC: 4.1 MMOL/L — SIGNIFICANT CHANGE UP (ref 3.5–5.3)
POTASSIUM SERPL-SCNC: 4.1 MMOL/L — SIGNIFICANT CHANGE UP (ref 3.5–5.3)
PROT SERPL-MCNC: 6 G/DL — SIGNIFICANT CHANGE UP (ref 6–8.3)
PROTHROM AB SERPL-ACNC: 12.5 SEC — SIGNIFICANT CHANGE UP (ref 9.8–12.7)
RBC # BLD: 4.59 M/UL — SIGNIFICANT CHANGE UP (ref 4.2–5.8)
RBC # FLD: 11.6 % — SIGNIFICANT CHANGE UP (ref 10.3–14.5)
SODIUM SERPL-SCNC: 141 MMOL/L — SIGNIFICANT CHANGE UP (ref 135–145)
VANCOMYCIN TROUGH SERPL-MCNC: 4.5 UG/ML — LOW (ref 10–20)
WBC # BLD: 7.8 K/UL — SIGNIFICANT CHANGE UP (ref 3.8–10.5)
WBC # FLD AUTO: 7.8 K/UL — SIGNIFICANT CHANGE UP (ref 3.8–10.5)

## 2018-04-30 PROCEDURE — 99233 SBSQ HOSP IP/OBS HIGH 50: CPT | Mod: GC

## 2018-04-30 RX ORDER — ASPIRIN/CALCIUM CARB/MAGNESIUM 324 MG
81 TABLET ORAL DAILY
Qty: 0 | Refills: 0 | Status: DISCONTINUED | OUTPATIENT
Start: 2018-04-30 | End: 2018-05-01

## 2018-04-30 RX ORDER — VANCOMYCIN HCL 1 G
1000 VIAL (EA) INTRAVENOUS EVERY 8 HOURS
Qty: 0 | Refills: 0 | Status: DISCONTINUED | OUTPATIENT
Start: 2018-04-30 | End: 2018-04-30

## 2018-04-30 RX ORDER — METOPROLOL TARTRATE 50 MG
12.5 TABLET ORAL
Qty: 0 | Refills: 0 | Status: DISCONTINUED | OUTPATIENT
Start: 2018-04-30 | End: 2018-05-01

## 2018-04-30 RX ORDER — FUROSEMIDE 40 MG
20 TABLET ORAL DAILY
Qty: 0 | Refills: 0 | Status: DISCONTINUED | OUTPATIENT
Start: 2018-04-30 | End: 2018-05-01

## 2018-04-30 RX ADMIN — HEPARIN SODIUM 5000 UNIT(S): 5000 INJECTION INTRAVENOUS; SUBCUTANEOUS at 05:36

## 2018-04-30 RX ADMIN — BUPRENORPHINE AND NALOXONE 1 TABLET(S): 2; .5 TABLET SUBLINGUAL at 11:09

## 2018-04-30 RX ADMIN — HEPARIN SODIUM 5000 UNIT(S): 5000 INJECTION INTRAVENOUS; SUBCUTANEOUS at 22:23

## 2018-04-30 RX ADMIN — Medication 20 MILLIGRAM(S): at 11:09

## 2018-04-30 RX ADMIN — Medication 81 MILLIGRAM(S): at 11:08

## 2018-04-30 RX ADMIN — PIPERACILLIN AND TAZOBACTAM 25 GRAM(S): 4; .5 INJECTION, POWDER, LYOPHILIZED, FOR SOLUTION INTRAVENOUS at 05:36

## 2018-04-30 RX ADMIN — HEPARIN SODIUM 5000 UNIT(S): 5000 INJECTION INTRAVENOUS; SUBCUTANEOUS at 13:50

## 2018-04-30 RX ADMIN — BUPRENORPHINE AND NALOXONE 1 TABLET(S): 2; .5 TABLET SUBLINGUAL at 11:30

## 2018-04-30 RX ADMIN — Medication 250 MILLIGRAM(S): at 05:36

## 2018-04-30 RX ADMIN — Medication 12.5 MILLIGRAM(S): at 17:59

## 2018-04-30 NOTE — CHART NOTE - NSCHARTNOTEFT_GEN_A_CORE
====================  CCU MIDNIGHT ROUNDS  ====================    JACQUE MAO  [id number]    ====================  SUMMARY: HPI:  38yom w/ hx of prescription opiate dependence, currently in recovery on suboxone presents with shortness of breath. Pt reports several days of a non-productive cough. Last night began having sharp, mid sternal chest pain, non-radiating, unchanged by movement, exertion, or inspiration but improved with sitting forward that woke him up from sleep, associated w/ significant shortness of breath. Originally presented to [facility] where he was hypoxic to the 80s on room air w/ diffuse bilateral infiltrates concerning for pulmonary edema, associated w/ positive troponin and ST elevations on ECG. Received aspirin/plavix load and heparin gtt. Transferred to [facility] for further management. Required BiPAP initially but could not tolerate it, but still requiring non-rebreather to maintain saturations. Aside from cough, denies any fevers, chills, weakness or other recent illness. No sick contacts or travel. Denies any other substance use or IV drug use.     In ED at [facility], vitals at triage HR 78, BP 97/75, RR 18 w/ SpO2 88% on room air and rectal temp 101. Chest pain has since resolved but still short of breath requiring non-rebreather. Labs remarkable for WBC 19 w/ left shift, troponin T 0.71 and pro-BNP 1758. Bedside echocardiogram revealed apical hypokinesis w/ diffuse b-lines bilaterally. Started on heparin gtt, received 20mg lasix IVP, and given dose of zosyn. Admitted to CCU for management of hypoxic respiratory failure secondary to possible myocarditis. (28 Apr 2018 14:13)    ====================        ====================  NEW EVENTS:  Pt started on metoprolol 12.5 mg BID and aspirin 81 mg. Net negative 820 ml over last 24 hours.   ====================        ====================  VITALS (Last 12 hrs):  ====================    T(C): 36.9 (04-30-18 @ 23:00), Max: 37 (04-30-18 @ 13:15)  HR: 62 (04-30-18 @ 23:00) (60 - 76)  BP: 96/61 (04-30-18 @ 23:00) (96/61 - 117/72)  BP(mean): 75 (04-30-18 @ 23:00) (75 - 89)  ABP: --  ABP(mean): --  RR: 16 (04-30-18 @ 15:38) (16 - 16)  SpO2: 98% (04-30-18 @ 13:15) (98% - 98%)  Wt(kg): --  CVP(mm Hg): --  CO: --  CI: --  PA: --  PA(mean): --  PA(direct): --  PCWP: --  SVR: --    TELEMETRY:        *BLOOD GAS/ARTERIAL/MIXED/VENOUS  *LACTATE    I&O's Summary    29 Apr 2018 07:01 - 30 Apr 2018 07:00  --------------------------------------------------------  IN: 1455 mL / OUT: 1180 mL / NET: 275 mL    30 Apr 2018 07:01  -  30 Apr 2018 23:06  --------------------------------------------------------  IN: 320 mL / OUT: 1140 mL / NET: -820 mL        ====================  PLAN:  ====================    HEALTH ISSUES - PROBLEM Dx:  Myocarditis: Myocarditis        HEALTH ISSUES - R/O PROBLEM Dx:      Virgilio Currie MD: [phone] ====================  CCU MIDNIGHT ROUNDS  ====================    JACQUE MAO  [id number]    ====================  SUMMARY: HPI:  38yom w/ hx of prescription opiate dependence, currently in recovery on suboxone presents with shortness of breath. Pt reports several days of a non-productive cough. Last night began having sharp, mid sternal chest pain, non-radiating, unchanged by movement, exertion, or inspiration but improved with sitting forward that woke him up from sleep, associated w/ significant shortness of breath. Originally presented to [facility] where he was hypoxic to the 80s on room air w/ diffuse bilateral infiltrates concerning for pulmonary edema, associated w/ positive troponin and ST elevations on ECG. Received aspirin/plavix load and heparin gtt. Transferred to [facility] for further management. Required BiPAP initially but could not tolerate it, but still requiring non-rebreather to maintain saturations. Aside from cough, denies any fevers, chills, weakness or other recent illness. No sick contacts or travel. Denies any other substance use or IV drug use.     In ED at [facility], vitals at triage HR 78, BP 97/75, RR 18 w/ SpO2 88% on room air and rectal temp 101. Chest pain has since resolved but still short of breath requiring non-rebreather. Labs remarkable for WBC 19 w/ left shift, troponin T 0.71 and pro-BNP 1758. Bedside echocardiogram revealed apical hypokinesis w/ diffuse b-lines bilaterally. Started on heparin gtt, received 20mg lasix IVP, and given dose of zosyn. Admitted to CCU for management of hypoxic respiratory failure secondary to possible myocarditis. (28 Apr 2018 14:13)    ====================        ====================  NEW EVENTS:  Antibiotics for bilateral ground glass opacities stopped this morning, remains afebrile. He was started on metoprolol 12.5 mg BID and aspirin 81 mg. Net negative 820 ml over last 24 hours. No complaints at present time, resting comfortably.   ====================        ====================  VITALS (Last 12 hrs):  ====================    T(C): 36.9 (04-30-18 @ 23:00), Max: 37 (04-30-18 @ 13:15)  HR: 62 (04-30-18 @ 23:00) (60 - 76)  BP: 96/61 (04-30-18 @ 23:00) (96/61 - 117/72)  BP(mean): 75 (04-30-18 @ 23:00) (75 - 89)  ABP: --  ABP(mean): --  RR: 16 (04-30-18 @ 15:38) (16 - 16)  SpO2: 98% (04-30-18 @ 13:15) (98% - 98%)  Wt(kg): --  CVP(mm Hg): --  CO: --  CI: --  PA: --  PA(mean): --  PA(direct): --  PCWP: --  SVR: --    TELEMETRY:        *BLOOD GAS/ARTERIAL/MIXED/VENOUS  *LACTATE    I&O's Summary    29 Apr 2018 07:01  -  30 Apr 2018 07:00  --------------------------------------------------------  IN: 1455 mL / OUT: 1180 mL / NET: 275 mL    30 Apr 2018 07:01  -  30 Apr 2018 23:06  --------------------------------------------------------  IN: 320 mL / OUT: 1140 mL / NET: -820 mL        ====================  ASSESSMENT:   38M with opiate dependence p/w chest pain and shortness of breath found to be febrile with pulmonary edema, HFrEF (25% 4/28/18) with wall motion abnormalities and positive cardiac enzymes, likely due to stress cardiomyopathy vs myocarditis.     PLAN:  1. HFrEF: suspected stress myopathy vs myocarditis   - goal net negative 500 cc overnight, strict I's and O's, daily weights, monitor Cr and electrolytes  2. Bilateral ground glass opacities on CT chest: likely pulmonary edema, monitoring off antibiotics      ====================    HEALTH ISSUES - PROBLEM Dx:  Myocarditis: Myocarditis        HEALTH ISSUES - R/O PROBLEM Dx:      Virgilio Currie MD: [phone] ====================  CCU MIDNIGHT ROUNDS  ====================    JACQUE MAO  [id number]    ====================  SUMMARY: HPI:  38yom w/ hx of prescription opiate dependence, currently in recovery on suboxone presents with shortness of breath. Pt reports several days of a non-productive cough. Last night began having sharp, mid sternal chest pain, non-radiating, unchanged by movement, exertion, or inspiration but improved with sitting forward that woke him up from sleep, associated w/ significant shortness of breath. Originally presented to [facility] where he was hypoxic to the 80s on room air w/ diffuse bilateral infiltrates concerning for pulmonary edema, associated w/ positive troponin and ST elevations on ECG. Received aspirin/plavix load and heparin gtt. Transferred to [facility] for further management. Required BiPAP initially but could not tolerate it, but still requiring non-rebreather to maintain saturations. Aside from cough, denies any fevers, chills, weakness or other recent illness. No sick contacts or travel. Denies any other substance use or IV drug use.     In ED at [facility], vitals at triage HR 78, BP 97/75, RR 18 w/ SpO2 88% on room air and rectal temp 101. Chest pain has since resolved but still short of breath requiring non-rebreather. Labs remarkable for WBC 19 w/ left shift, troponin T 0.71 and pro-BNP 1758. Bedside echocardiogram revealed apical hypokinesis w/ diffuse b-lines bilaterally. Started on heparin gtt, received 20mg lasix IVP, and given dose of zosyn. Admitted to CCU for management of hypoxic respiratory failure secondary to possible myocarditis. (28 Apr 2018 14:13)    ====================        ====================  NEW EVENTS:  Antibiotics for bilateral ground glass opacities stopped this morning, remains afebrile. He was started on metoprolol 12.5 mg BID and aspirin 81 mg. Net negative 820 ml over last 24 hours. No complaints at present time, resting comfortably.   ====================        ====================  VITALS (Last 12 hrs):  ====================    T(C): 36.9 (04-30-18 @ 23:00), Max: 37 (04-30-18 @ 13:15)  HR: 62 (04-30-18 @ 23:00) (60 - 76)  BP: 96/61 (04-30-18 @ 23:00) (96/61 - 117/72)  BP(mean): 75 (04-30-18 @ 23:00) (75 - 89)  ABP: --  ABP(mean): --  RR: 16 (04-30-18 @ 15:38) (16 - 16)  SpO2: 98% (04-30-18 @ 13:15) (98% - 98%)  Wt(kg): --  CVP(mm Hg): --  CO: --  CI: --  PA: --  PA(mean): --  PA(direct): --  PCWP: --  SVR: --    TELEMETRY: No events        *BLOOD GAS/ARTERIAL/MIXED/VENOUS  *LACTATE    I&O's Summary    29 Apr 2018 07:01  -  30 Apr 2018 07:00  --------------------------------------------------------  IN: 1455 mL / OUT: 1180 mL / NET: 275 mL    30 Apr 2018 07:01  -  30 Apr 2018 23:06  --------------------------------------------------------  IN: 320 mL / OUT: 1140 mL / NET: -820 mL        ====================  ASSESSMENT:   38M with opiate dependence p/w chest pain and shortness of breath found to be febrile with pulmonary edema, HFrEF (25% 4/28/18) with wall motion abnormalities and positive cardiac enzymes, likely due to stress cardiomyopathy vs myocarditis.     PLAN:  1. HFrEF: suspected stress myopathy vs myocarditis   - goal net negative 500 cc overnight, strict I's and O's, daily weights, monitor Cr and electrolytes  - continue lasix 20 mg po daily, will give 20 mg IV push if needed  2. Bilateral ground glass opacities on CT chest: likely pulmonary edema  - continue to monitor off antibiotics, remains afebrile, blood cultures 4/28 NTD      ====================    HEALTH ISSUES - PROBLEM Dx:  Myocarditis: Myocarditis        HEALTH ISSUES - R/O PROBLEM Dx:      Virgilio Currie MD: [phone] ====================  CCU MIDNIGHT ROUNDS  ====================    JACQUE MAO  [id number]    ====================  SUMMARY: HPI:  38yom w/ hx of prescription opiate dependence, currently in recovery on suboxone presents with shortness of breath. Pt reports several days of a non-productive cough. Last night began having sharp, mid sternal chest pain, non-radiating, unchanged by movement, exertion, or inspiration but improved with sitting forward that woke him up from sleep, associated w/ significant shortness of breath. Originally presented to [facility] where he was hypoxic to the 80s on room air w/ diffuse bilateral infiltrates concerning for pulmonary edema, associated w/ positive troponin and ST elevations on ECG. Received aspirin/plavix load and heparin gtt. Transferred to [facility] for further management. Required BiPAP initially but could not tolerate it, but still requiring non-rebreather to maintain saturations. Aside from cough, denies any fevers, chills, weakness or other recent illness. No sick contacts or travel. Denies any other substance use or IV drug use.     In ED at [facility], vitals at triage HR 78, BP 97/75, RR 18 w/ SpO2 88% on room air and rectal temp 101. Chest pain has since resolved but still short of breath requiring non-rebreather. Labs remarkable for WBC 19 w/ left shift, troponin T 0.71 and pro-BNP 1758. Bedside echocardiogram revealed apical hypokinesis w/ diffuse b-lines bilaterally. Started on heparin gtt, received 20mg lasix IVP, and given dose of zosyn. Admitted to CCU for management of hypoxic respiratory failure secondary to possible myocarditis. (28 Apr 2018 14:13)    ====================        ====================  NEW EVENTS:  Antibiotics for bilateral ground glass opacities stopped this morning, remains afebrile. He was started on metoprolol 12.5 mg BID and aspirin 81 mg. Net negative 820 ml over last 24 hours. No complaints at present time, resting comfortably.   ====================        ====================  VITALS (Last 12 hrs):  ====================    T(C): 36.9 (04-30-18 @ 23:00), Max: 37 (04-30-18 @ 13:15)  HR: 62 (04-30-18 @ 23:00) (60 - 76)  BP: 96/61 (04-30-18 @ 23:00) (96/61 - 117/72)  BP(mean): 75 (04-30-18 @ 23:00) (75 - 89)  ABP: --  ABP(mean): --  RR: 16 (04-30-18 @ 15:38) (16 - 16)  SpO2: 98% (04-30-18 @ 13:15) (98% - 98%)  Wt(kg): --  CVP(mm Hg): --  CO: --  CI: --  PA: --  PA(mean): --  PA(direct): --  PCWP: --  SVR: --    TELEMETRY: No events        *BLOOD GAS/ARTERIAL/MIXED/VENOUS  *LACTATE    I&O's Summary    29 Apr 2018 07:01  -  30 Apr 2018 07:00  --------------------------------------------------------  IN: 1455 mL / OUT: 1180 mL / NET: 275 mL    30 Apr 2018 07:01  -  30 Apr 2018 23:06  --------------------------------------------------------  IN: 320 mL / OUT: 1140 mL / NET: -820 mL        ====================  ASSESSMENT:   38M with opiate dependence p/w chest pain and shortness of breath found to be febrile with pulmonary edema, HFrEF (25% 4/28/18) with wall motion abnormalities and positive cardiac enzymes, likely due to stress cardiomyopathy in setting of cocaine use? vs myocarditis.     PLAN:  1. HFrEF: suspected stress myopathy vs myocarditis   - goal net negative 500 cc overnight, strict I's and O's, daily weights, monitor Cr and electrolytes  - continue lasix 20 mg po daily, will give 20 mg IV push if needed  2. Bilateral ground glass opacities on CT chest: likely pulmonary edema  - continue to monitor off antibiotics, remains afebrile, blood cultures 4/28 NTD      ====================    HEALTH ISSUES - PROBLEM Dx:  Myocarditis: Myocarditis        HEALTH ISSUES - R/O PROBLEM Dx:      Virgilio Currie MD: [phone]

## 2018-04-30 NOTE — CHART NOTE - NSCHARTNOTEFT_GEN_A_CORE
CCU Transfer Note    Transfer from: CCU    Transfer to: (  ) Medicine    (  ) Telemetry     (   ) RCU        (    ) Palliative         (   ) Stroke Unit       (  ) MICU   (   ) __________________    Accepting Physician:    Signout given to:     CCU COURSE: This is a 38 year old man with pasat medical history of prescription opiate dependence, currently in recovery on suboxone presents with shortness of breath. Pt reports several days of a non-productive cough. Last night began having sharp, mid sternal chest pain, non-radiating, unchanged by movement, exertion, or inspiration but improved with sitting forward that woke him up from sleep, associated w/ significant shortness of breath. Originally presented to [facility] where he was hypoxic to the 80s on room air w/ diffuse bilateral infiltrates concerning for pulmonary edema, associated w/ positive troponin and ST elevations on ECG. Received aspirin/plavix load and heparin gtt. Transferred to [facility] for further management. Required BiPAP initially but could not tolerate it, but still requiring non-rebreather to maintain saturations. Aside from cough, denies any fevers, chills, weakness or other recent illness. No sick contacts or travel. Denies any other substance use or IV drug use.    In ED at [facility], vitals at triage HR 78, BP 97/75, RR 18 w/ SpO2 88% on room air and rectal temp 101. Chest pain has since resolved but still short of breath requiring non-rebreather. Labs remarkable for WBC 19 w/ left shift, troponin T 0.71 and pro-BNP 1758. Bedside echocardiogram revealed apical hypokinesis w/ diffuse b-lines bilaterally. Started on heparin gtt, received 20mg lasix IVP, and given dose of zosyn. Admitted to CCU for management of hypoxic respiratory failure secondary to possible myocarditis vs stress myopathy.     Had cardiac catheterization on 4/28 that showed normal coronary arteries and a swan-le was placed. Was initially requiring high-flow nasal cannula but was weaned to room air after diuresis. BCT of the chest on 4/29 showed diffuse ground glass opacities consistent with pulmonary edema vs infection, however pt remained afebrile and leukocytosis resolved so antibiotics were discontinued on 4/30 as presentation now seems more primary cardiac. Started on low dose aspirin, metoprolol, with improvement in hemodynamics, now with systolics in low 100s. Planning for Cardiac MRI to evaluate for myocarditis    PAST MEDICAL & SURGICAL HISTORY:  Hyperthyroidism  Anxiety  H/O carpal tunnel repair      Vital Signs Last 24 Hrs  T(C): 37 (30 Apr 2018 13:15), Max: 37.3 (29 Apr 2018 19:00)  T(F): 98.6 (30 Apr 2018 13:15), Max: 99.2 (29 Apr 2018 19:00)  HR: 62 (30 Apr 2018 15:38) (60 - 78)  BP: 115/67 (30 Apr 2018 15:38) (78/50 - 117/72)  BP(mean): 86 (30 Apr 2018 15:38) (58 - 86)  RR: 16 (30 Apr 2018 15:38) (16 - 35)  SpO2: 98% (30 Apr 2018 13:15) (93% - 100%)  I&O's Summary    29 Apr 2018 07:01  -  30 Apr 2018 07:00  --------------------------------------------------------  IN: 1455 mL / OUT: 1180 mL / NET: 275 mL    30 Apr 2018 07:01  -  30 Apr 2018 17:14  --------------------------------------------------------  IN: 320 mL / OUT: 440 mL / NET: -120 mL      Allergies    Tylenol Cold &amp; Flu Daytime (Short breath)    Intolerances      MEDICATIONS  (STANDING):  aspirin enteric coated 81 milliGRAM(s) Oral daily  buprenorphine 8 mG/naloxone 2 mG SL  Tablet 1 Tablet(s) SubLingual daily  furosemide    Tablet 20 milliGRAM(s) Oral daily  heparin  Injectable 5000 Unit(s) SubCutaneous every 8 hours  influenza   Vaccine 0.5 milliLiter(s) IntraMuscular once  metoprolol tartrate 12.5 milliGRAM(s) Oral two times a day    MEDICATIONS  (PRN):      CARDIAC MARKERS ( 29 Apr 2018 05:01 )  x     / 0.60 ng/mL / 497 U/L / x     / 45.9 ng/mL  CARDIAC MARKERS ( 28 Apr 2018 20:13 )  x     / 0.72 ng/mL / 734 U/L / x     / 73.1 ng/mL                            13.1   7.8   )-----------( 214      ( 30 Apr 2018 04:32 )             40.2     04-30    141  |  107  |  17  ----------------------------<  101<H>  4.1   |  24  |  0.82    Ca    9.1      30 Apr 2018 04:32  Phos  3.1     04-30  Mg     2.3     04-30    TPro  6.0  /  Alb  3.5  /  TBili  0.6  /  DBili  x   /  AST  38  /  ALT  35  /  AlkPhos  47  04-30    PT/INR - ( 30 Apr 2018 04:32 )   PT: 12.5 sec;   INR: 1.15 ratio         PTT - ( 30 Apr 2018 04:32 )  PTT:28.8 sec      PHYSICAL EXAM:  Constitutional: No acute distress  Neuro: A+OX3  Respiratory: CTA Bilaterally, scant bibasilar crackles  Cardiovascular: S1S2 RRR  Gastrointestinal: +bs  Extremities: No pedal edema  Vascular: +2 pedal pulses      ASSESSMENT & PLAN:   Healthy 38yom w/ hx of opiate dependence p/w chest pain and shortness of breath, w/ fever, bilateral pulmonary edema, wall motion abnormalities and positive cardiac enzymes, diagnosis myocarditis vs stress myopathy    Plan:    Neuro  - Awake, alert, no issues  - Analgesia PRN  - Continue Suboxone home regimen    CV  - Clean coronaries  - Start low dose ASA, low dose metoprolol  - ACE, spironolactone when BP can tolerate it  - Cardiac MRI to eval for myocarditis    Pulm  - Continue to wean oxygen as tolerated  - Maintain SpO2 over 92%  - CT w/ diffuse ground glass, continue abx for presumed pneumonia    GI  - Reg diet      - Trend BUN/Cr  - Monitor electrolytes  - Strict ins and outs    Heme  - Trend CBC  - Heparin SubQ ppx    ID  - Cultures pan-negative so far  - No leukocytosis or continued fevers  - DC abx and monitor for any signs of infection    Endo  - No acute issues  - Monitor glucose on chemistries          FOR FOLLOW UP:        CCU [id number] CCU Transfer Note    Transfer from: CCU    Transfer to: (  ) Medicine    (  ) Telemetry     (   ) RCU        (    ) Palliative         (   ) Stroke Unit       (  ) MICU   (   ) __________________    Accepting Physician:    Signout given to:     CCU COURSE: This is a 38 year old man with pasat medical history of prescription opiate dependence, currently in recovery on suboxone presents with shortness of breath. Pt reports several days of a non-productive cough. Last night began having sharp, mid sternal chest pain, non-radiating, unchanged by movement, exertion, or inspiration but improved with sitting forward that woke him up from sleep, associated w/ significant shortness of breath. Originally presented to [facility] where he was hypoxic to the 80s on room air w/ diffuse bilateral infiltrates concerning for pulmonary edema, associated w/ positive troponin and ST elevations on ECG. Received aspirin/plavix load and heparin gtt. Transferred to [facility] for further management. Required BiPAP initially but could not tolerate it, but still requiring non-rebreather to maintain saturations. Aside from cough, denies any fevers, chills, weakness or other recent illness. No sick contacts or travel. Denies any other substance use or IV drug use.    In ED at [facility], vitals at triage HR 78, BP 97/75, RR 18 w/ SpO2 88% on room air and rectal temp 101. Chest pain has since resolved but still short of breath requiring non-rebreather. Labs remarkable for WBC 19 w/ left shift, troponin T 0.71 and pro-BNP 1758. Bedside echocardiogram revealed apical hypokinesis w/ diffuse b-lines bilaterally. Started on heparin gtt, received 20mg lasix IVP, and given dose of zosyn. Admitted to CCU for management of hypoxic respiratory failure secondary to possible myocarditis vs stress myopathy.     Had cardiac catheterization on 4/28 that showed normal coronary arteries and a swan-le was placed. Was initially requiring high-flow nasal cannula but was weaned to room air after diuresis. BCT of the chest on 4/29 showed diffuse ground glass opacities consistent with pulmonary edema vs infection, however pt remained afebrile and leukocytosis resolved so antibiotics were discontinued on 4/30 as presentation now seems more primary cardiac. Started on low dose aspirin, metoprolol, with improvement in hemodynamics, now with systolics in low 100s. Planning for Cardiac MRI to evaluate for myocarditis    PAST MEDICAL & SURGICAL HISTORY:  Hyperthyroidism  Anxiety  H/O carpal tunnel repair      Vital Signs Last 24 Hrs  T(C): 37 (30 Apr 2018 13:15), Max: 37.3 (29 Apr 2018 19:00)  T(F): 98.6 (30 Apr 2018 13:15), Max: 99.2 (29 Apr 2018 19:00)  HR: 62 (30 Apr 2018 15:38) (60 - 78)  BP: 115/67 (30 Apr 2018 15:38) (78/50 - 117/72)  BP(mean): 86 (30 Apr 2018 15:38) (58 - 86)  RR: 16 (30 Apr 2018 15:38) (16 - 35)  SpO2: 98% (30 Apr 2018 13:15) (93% - 100%)  I&O's Summary    29 Apr 2018 07:01  -  30 Apr 2018 07:00  --------------------------------------------------------  IN: 1455 mL / OUT: 1180 mL / NET: 275 mL    30 Apr 2018 07:01  -  30 Apr 2018 17:14  --------------------------------------------------------  IN: 320 mL / OUT: 440 mL / NET: -120 mL      Allergies    Tylenol Cold &amp; Flu Daytime (Short breath)    Intolerances      MEDICATIONS  (STANDING):  aspirin enteric coated 81 milliGRAM(s) Oral daily  buprenorphine 8 mG/naloxone 2 mG SL  Tablet 1 Tablet(s) SubLingual daily  furosemide    Tablet 20 milliGRAM(s) Oral daily  heparin  Injectable 5000 Unit(s) SubCutaneous every 8 hours  influenza   Vaccine 0.5 milliLiter(s) IntraMuscular once  metoprolol tartrate 12.5 milliGRAM(s) Oral two times a day    MEDICATIONS  (PRN):      CARDIAC MARKERS ( 29 Apr 2018 05:01 )  x     / 0.60 ng/mL / 497 U/L / x     / 45.9 ng/mL  CARDIAC MARKERS ( 28 Apr 2018 20:13 )  x     / 0.72 ng/mL / 734 U/L / x     / 73.1 ng/mL                            13.1   7.8   )-----------( 214      ( 30 Apr 2018 04:32 )             40.2     04-30    141  |  107  |  17  ----------------------------<  101<H>  4.1   |  24  |  0.82    Ca    9.1      30 Apr 2018 04:32  Phos  3.1     04-30  Mg     2.3     04-30    TPro  6.0  /  Alb  3.5  /  TBili  0.6  /  DBili  x   /  AST  38  /  ALT  35  /  AlkPhos  47  04-30    PT/INR - ( 30 Apr 2018 04:32 )   PT: 12.5 sec;   INR: 1.15 ratio         PTT - ( 30 Apr 2018 04:32 )  PTT:28.8 sec      PHYSICAL EXAM:  Constitutional: No acute distress  Neuro: A+OX3  Respiratory: CTA Bilaterally, scant bibasilar crackles  Cardiovascular: S1S2 RRR  Gastrointestinal: +bs  Extremities: No pedal edema  Vascular: +2 pedal pulses      ASSESSMENT & PLAN:   Healthy 38yom w/ hx of opiate dependence p/w chest pain and shortness of breath, w/ fever, bilateral pulmonary edema, wall motion abnormalities and positive cardiac enzymes, diagnosis myocarditis vs stress myopathy    Plan:    Neuro  - Awake, alert, no issues  - Analgesia PRN  - Continue Suboxone home regimen      - [facility] 4/28 with normal coronary arteries   - Started ASA 81 mg and metoprolol 12.5 mg BID  - ACE, spironolactone when BP can tolerate it  - Cardiac MRI to eval for myocarditis    Pulm  - Continue to wean oxygen as tolerated  - Maintain SpO2 over 92%  - CT w/ diffuse ground glass, stopped abx as findings most likely cardiac in origin    GI  - Reg diet      - Trend BUN/Cr  - Monitor electrolytes  - Strict ins and outs    Heme  - Trend CBC  - Heparin SubQ ppx    ID  - Cultures pan-negative so far  - No leukocytosis or continued fevers since ED  - DC'd abx and monitor for any signs of infection    Endo  - No acute issues  - Monitor glucose on chemistries          FOR FOLLOW UP:        CCU [id number] CCU Transfer Note    Transfer from: CCU    Transfer to: ( x ) Medicine    (  ) Telemetry     (   ) RCU        (    ) Palliative         (   ) Stroke Unit       (  ) MICU   (   ) __________________    Accepting Physician: Dr. Pham    Signout given to:     CCU COURSE: This is a 38 year old man with pasat medical history of prescription opiate dependence, currently in recovery on suboxone presents with shortness of breath. Pt reports several days of a non-productive cough. Last night began having sharp, mid sternal chest pain, non-radiating, unchanged by movement, exertion, or inspiration but improved with sitting forward that woke him up from sleep, associated w/ significant shortness of breath. Originally presented to [facility] where he was hypoxic to the 80s on room air w/ diffuse bilateral infiltrates concerning for pulmonary edema, associated w/ positive troponin and ST elevations on ECG. Received aspirin/plavix load and heparin gtt. Transferred to [facility] for further management. Required BiPAP initially but could not tolerate it, but still requiring non-rebreather to maintain saturations. Aside from cough, denies any fevers, chills, weakness or other recent illness. No sick contacts or travel. Denies any other substance use or IV drug use.    In ED at [facility], vitals at triage HR 78, BP 97/75, RR 18 w/ SpO2 88% on room air and rectal temp 101. Chest pain has since resolved but still short of breath requiring non-rebreather. Labs remarkable for WBC 19 w/ left shift, troponin T 0.71 and pro-BNP 1758. Bedside echocardiogram revealed apical hypokinesis w/ diffuse b-lines bilaterally. Started on heparin gtt, received 20mg lasix IVP, and given dose of zosyn. Admitted to CCU for management of hypoxic respiratory failure secondary to possible myocarditis vs stress myopathy.     Had cardiac catheterization on 4/28 that showed normal coronary arteries and a swan-le was placed. Was initially requiring high-flow nasal cannula but was weaned to room air after diuresis. BCT of the chest on 4/29 showed diffuse ground glass opacities consistent with pulmonary edema vs infection, however pt remained afebrile and leukocytosis resolved so antibiotics were discontinued on 4/30 as presentation now seems more primary cardiac. Started on low dose aspirin, metoprolol, with improvement in hemodynamics, now with systolics in low 100s. Planning for Cardiac MRI to evaluate for myocarditis    PAST MEDICAL & SURGICAL HISTORY:  Hyperthyroidism  Anxiety  H/O carpal tunnel repair      Vital Signs Last 24 Hrs  T(C): 37 (30 Apr 2018 13:15), Max: 37.3 (29 Apr 2018 19:00)  T(F): 98.6 (30 Apr 2018 13:15), Max: 99.2 (29 Apr 2018 19:00)  HR: 62 (30 Apr 2018 15:38) (60 - 78)  BP: 115/67 (30 Apr 2018 15:38) (78/50 - 117/72)  BP(mean): 86 (30 Apr 2018 15:38) (58 - 86)  RR: 16 (30 Apr 2018 15:38) (16 - 35)  SpO2: 98% (30 Apr 2018 13:15) (93% - 100%)  I&O's Summary    29 Apr 2018 07:01  -  30 Apr 2018 07:00  --------------------------------------------------------  IN: 1455 mL / OUT: 1180 mL / NET: 275 mL    30 Apr 2018 07:01  -  30 Apr 2018 17:14  --------------------------------------------------------  IN: 320 mL / OUT: 440 mL / NET: -120 mL      Allergies    Tylenol Cold &amp; Flu Daytime (Short breath)    Intolerances      MEDICATIONS  (STANDING):  aspirin enteric coated 81 milliGRAM(s) Oral daily  buprenorphine 8 mG/naloxone 2 mG SL  Tablet 1 Tablet(s) SubLingual daily  furosemide    Tablet 20 milliGRAM(s) Oral daily  heparin  Injectable 5000 Unit(s) SubCutaneous every 8 hours  influenza   Vaccine 0.5 milliLiter(s) IntraMuscular once  metoprolol tartrate 12.5 milliGRAM(s) Oral two times a day    MEDICATIONS  (PRN):      CARDIAC MARKERS ( 29 Apr 2018 05:01 )  x     / 0.60 ng/mL / 497 U/L / x     / 45.9 ng/mL  CARDIAC MARKERS ( 28 Apr 2018 20:13 )  x     / 0.72 ng/mL / 734 U/L / x     / 73.1 ng/mL                            13.1   7.8   )-----------( 214      ( 30 Apr 2018 04:32 )             40.2     04-30    141  |  107  |  17  ----------------------------<  101<H>  4.1   |  24  |  0.82    Ca    9.1      30 Apr 2018 04:32  Phos  3.1     04-30  Mg     2.3     04-30    TPro  6.0  /  Alb  3.5  /  TBili  0.6  /  DBili  x   /  AST  38  /  ALT  35  /  AlkPhos  47  04-30    PT/INR - ( 30 Apr 2018 04:32 )   PT: 12.5 sec;   INR: 1.15 ratio         PTT - ( 30 Apr 2018 04:32 )  PTT:28.8 sec      PHYSICAL EXAM:  Constitutional: No acute distress  Neuro: A+OX3  Respiratory: CTA Bilaterally, scant bibasilar crackles  Cardiovascular: S1S2 RRR  Gastrointestinal: +bs  Extremities: No pedal edema  Vascular: +2 pedal pulses      ASSESSMENT & PLAN:   Healthy 38yom w/ hx of opiate dependence p/w chest pain and shortness of breath, w/ fever, bilateral pulmonary edema, wall motion abnormalities and positive cardiac enzymes, diagnosis myocarditis vs stress myopathy    Plan:    Neuro  - Awake, alert, no issues  - Analgesia PRN  - Continue Suboxone home regimen    CV  - [facility] 4/28 with normal coronary arteries   - Started ASA 81 mg and metoprolol 12.5 mg BID  - ACE, spironolactone when BP can tolerate it  - Cardiac MRI to eval for myocarditis if pt agreeable    Pulm  - Continue to wean oxygen as tolerated  - Maintain SpO2 over 92%  - CT w/ diffuse ground glass, stopped abx as findings most likely cardiac in origin    GI  - Reg diet      - Trend BUN/Cr  - Monitor electrolytes  - Strict ins and outs    Heme  - Trend CBC  - Heparin SubQ ppx    ID  - Cultures pan-negative so far  - No leukocytosis or continued fevers since ED  - DC'd abx and monitor for any signs of infection    Endo  - No acute issues  - Monitor glucose on chemistries CCU Transfer Note    Transfer from: CCU    Transfer to: ( x ) Medicine    (  ) Telemetry     (   ) RCU        (    ) Palliative         (   ) Stroke Unit       (  ) MICU   (   ) __________________    Accepting Physician: Dr. Pham    Signout given to: MAR    CCU COURSE: This is a 38 year old man with pasat medical history of prescription opiate dependence, currently in recovery on suboxone presents with shortness of breath. Pt reports several days of a non-productive cough. Last night began having sharp, mid sternal chest pain, non-radiating, unchanged by movement, exertion, or inspiration but improved with sitting forward that woke him up from sleep, associated w/ significant shortness of breath. Originally presented to [facility] where he was hypoxic to the 80s on room air w/ diffuse bilateral infiltrates concerning for pulmonary edema, associated w/ positive troponin and ST elevations on ECG. Received aspirin/plavix load and heparin gtt. Transferred to [facility] for further management. Required BiPAP initially but could not tolerate it, but still requiring non-rebreather to maintain saturations. Aside from cough, denies any fevers, chills, weakness or other recent illness. No sick contacts or travel. Denies any other substance use or IV drug use.    In ED at [facility], vitals at triage HR 78, BP 97/75, RR 18 w/ SpO2 88% on room air and rectal temp 101. Chest pain has since resolved but still short of breath requiring non-rebreather. Labs remarkable for WBC 19 w/ left shift, troponin T 0.71 and pro-BNP 1758. Bedside echocardiogram revealed apical hypokinesis w/ diffuse b-lines bilaterally. Started on heparin gtt, received 20mg lasix IVP, and given dose of zosyn. Admitted to CCU for management of hypoxic respiratory failure secondary to possible myocarditis vs stress myopathy.     Had cardiac catheterization on 4/28 that showed normal coronary arteries and a swan-le was placed. Was initially requiring high-flow nasal cannula but was weaned to room air after diuresis. BCT of the chest on 4/29 showed diffuse ground glass opacities consistent with pulmonary edema vs infection, however pt remained afebrile and leukocytosis resolved so antibiotics were discontinued on 4/30 as presentation now seems more primary cardiac. Started on low dose aspirin, metoprolol, with improvement in hemodynamics, now with systolics in low 100s. Planning for Cardiac MRI to evaluate for myocarditis    PAST MEDICAL & SURGICAL HISTORY:  Hyperthyroidism  Anxiety  H/O carpal tunnel repair      Vital Signs Last 24 Hrs  T(C): 37 (30 Apr 2018 13:15), Max: 37.3 (29 Apr 2018 19:00)  T(F): 98.6 (30 Apr 2018 13:15), Max: 99.2 (29 Apr 2018 19:00)  HR: 62 (30 Apr 2018 15:38) (60 - 78)  BP: 115/67 (30 Apr 2018 15:38) (78/50 - 117/72)  BP(mean): 86 (30 Apr 2018 15:38) (58 - 86)  RR: 16 (30 Apr 2018 15:38) (16 - 35)  SpO2: 98% (30 Apr 2018 13:15) (93% - 100%)  I&O's Summary    29 Apr 2018 07:01  -  30 Apr 2018 07:00  --------------------------------------------------------  IN: 1455 mL / OUT: 1180 mL / NET: 275 mL    30 Apr 2018 07:01  -  30 Apr 2018 17:14  --------------------------------------------------------  IN: 320 mL / OUT: 440 mL / NET: -120 mL      Allergies    Tylenol Cold &amp; Flu Daytime (Short breath)    Intolerances      MEDICATIONS  (STANDING):  aspirin enteric coated 81 milliGRAM(s) Oral daily  buprenorphine 8 mG/naloxone 2 mG SL  Tablet 1 Tablet(s) SubLingual daily  furosemide    Tablet 20 milliGRAM(s) Oral daily  heparin  Injectable 5000 Unit(s) SubCutaneous every 8 hours  influenza   Vaccine 0.5 milliLiter(s) IntraMuscular once  metoprolol tartrate 12.5 milliGRAM(s) Oral two times a day    MEDICATIONS  (PRN):      CARDIAC MARKERS ( 29 Apr 2018 05:01 )  x     / 0.60 ng/mL / 497 U/L / x     / 45.9 ng/mL  CARDIAC MARKERS ( 28 Apr 2018 20:13 )  x     / 0.72 ng/mL / 734 U/L / x     / 73.1 ng/mL                            13.1   7.8   )-----------( 214      ( 30 Apr 2018 04:32 )             40.2     04-30    141  |  107  |  17  ----------------------------<  101<H>  4.1   |  24  |  0.82    Ca    9.1      30 Apr 2018 04:32  Phos  3.1     04-30  Mg     2.3     04-30    TPro  6.0  /  Alb  3.5  /  TBili  0.6  /  DBili  x   /  AST  38  /  ALT  35  /  AlkPhos  47  04-30    PT/INR - ( 30 Apr 2018 04:32 )   PT: 12.5 sec;   INR: 1.15 ratio         PTT - ( 30 Apr 2018 04:32 )  PTT:28.8 sec      PHYSICAL EXAM:  Constitutional: No acute distress  Neuro: A+OX3  Respiratory: CTA Bilaterally, scant bibasilar crackles  Cardiovascular: S1S2 RRR  Gastrointestinal: +bs  Extremities: No pedal edema  Vascular: +2 pedal pulses      ASSESSMENT & PLAN:   Healthy 38yom w/ hx of opiate dependence p/w chest pain and shortness of breath, w/ fever, bilateral pulmonary edema, wall motion abnormalities and positive cardiac enzymes, diagnosis myocarditis vs stress myopathy    Plan:    Neuro  - Awake, alert, no issues  - Analgesia PRN  - Continue Suboxone home regimen    CV  - [facility] 4/28 with normal coronary arteries   - Started ASA 81 mg and metoprolol 12.5 mg BID  - ACE, spironolactone when BP can tolerate it  - Cardiac MRI to eval for myocarditis if pt agreeable    Pulm  - Continue to wean oxygen as tolerated  - Maintain SpO2 over 92%  - CT w/ diffuse ground glass, stopped abx as findings most likely cardiac in origin    GI  - Reg diet      - Trend BUN/Cr  - Monitor electrolytes  - Strict ins and outs    Heme  - Trend CBC  - Heparin SubQ ppx    ID  - Cultures pan-negative so far  - No leukocytosis or continued fevers since ED  - DC'd abx and monitor for any signs of infection    Endo  - No acute issues  - Monitor glucose on chemistries

## 2018-04-30 NOTE — PROGRESS NOTE ADULT - ASSESSMENT
Healthy 38yom w/ hx of opiate dependence p/w chest pain and shortness of breath, w/ fever, bilateral pulmonary edema, wall motion abnormalities and positive cardiac enzymes, diagnosis myocarditis vs stress myopathy    Plan:    Neuro  - Awake, alert, no issues  - Analgesia PRN  - Continue Suboxone home regimen    CV  - Clean coronaries, no indication for APT  - ACE, BB, spironolactone when BP can tolerate it    Pulm  - Continue to wean oxygen as tolerated  - Maintain SpO2 over 92%  - CT w/ diffuse ground glass, continue abx for presumed pneumonia    GI  - Reg diet      - Trend BUN/Cr  - Monitor electrolytes  - Strict ins and outs    Heme  - Trend CBC  - Heparin SubQ ppx    ID  - Cultures pan-negative so far  - Vanco/zosyn empirically  - Consider deescalating to CAP regimen such as ceftriaxone/azithro    Endo  - No acute issues  - Monitor glucose on chemistries Healthy 38yom w/ hx of opiate dependence p/w chest pain and shortness of breath, w/ fever, bilateral pulmonary edema, wall motion abnormalities and positive cardiac enzymes, diagnosis myocarditis vs stress myopathy    Plan:    Neuro  - Awake, alert, no issues  - Analgesia PRN  - Continue Suboxone home regimen    CV  - Clean coronaries  - Start low dose ASA, low dose metoprolol  - ACE, spironolactone when BP can tolerate it  - Cardiac MRI to eval for myocarditis    Pulm  - Continue to wean oxygen as tolerated  - Maintain SpO2 over 92%  - CT w/ diffuse ground glass, continue abx for presumed pneumonia    GI  - Reg diet      - Trend BUN/Cr  - Monitor electrolytes  - Strict ins and outs    Heme  - Trend CBC  - Heparin SubQ ppx    ID  - Cultures pan-negative so far  - No leukocytosis or continued fevers  - DC abx and monitor for any signs of infection    Endo  - No acute issues  - Monitor glucose on chemistries

## 2018-04-30 NOTE — PROGRESS NOTE ADULT - PROBLEM SELECTOR PLAN 1
s/p Cath NML Coronaries  TTE EF 27% Severe segmental left ventricular systolic dysfunction. Akinesis of the mid to apical septum, apex,  distal lateral, inferolateral wall.  ASA  Consider Beta Blocker when urine toxicology comes back and patient can tolerate.  Continue Vancomycin and Zosyn
s/p Cath NML Coronaries  TTE EF 27% Severe segmental left ventricular systolic dysfunction. Akinesis of the mid to apical septum, apex,  distal lateral, inferolateral wall.  ASA  Consider Beta Blocker when urine toxicology comes back and patient can tolerate.  Continue Vancomycin and Zosyn

## 2018-04-30 NOTE — PROGRESS NOTE ADULT - SUBJECTIVE AND OBJECTIVE BOX
CHIEF COMPLAINT: Myocarditis, ADHF    INTERVAL HISTORY: Feels generally weak. Oxygen requirement decreasing. No chest pain, shortness of breath.       HPI: This is a 38 year old man with pasat medical history of prescription opiate dependence, currently in recovery on suboxone presents with shortness of breath. Pt reports several days of a non-productive cough. Last night began having sharp, mid sternal chest pain, non-radiating, unchanged by movement, exertion, or inspiration but improved with sitting forward that woke him up from sleep, associated w/ significant shortness of breath. Originally presented to [facility] where he was hypoxic to the 80s on room air w/ diffuse bilateral infiltrates concerning for pulmonary edema, associated w/ positive troponin and ST elevations on ECG. Received aspirin/plavix load and heparin gtt. Transferred to [facility] for further management. Required BiPAP initially but could not tolerate it, but still requiring non-rebreather to maintain saturations. Aside from cough, denies any fevers, chills, weakness or other recent illness. No sick contacts or travel. Denies any other substance use or IV drug use.     In ED at [facility], vitals at triage HR 78, BP 97/75, RR 18 w/ SpO2 88% on room air and rectal temp 101. Chest pain has since resolved but still short of breath requiring non-rebreather. Labs remarkable for WBC 19 w/ left shift, troponin T 0.71 and pro-BNP 1758. Bedside echocardiogram revealed apical hypokinesis w/ diffuse b-lines bilaterally. Started on heparin gtt, received 20mg lasix IVP, and given dose of zosyn. Admitted to CCU for management of hypoxic respiratory failure secondary to possible myocarditis. S/P Cath showing normal coronaries    REVIEW OF SYSTEMS: Denies CP, SOB, all others negative    MEDICATIONS  (STANDING):  buprenorphine 8 mG/naloxone 2 mG SL  Tablet 1 Tablet(s) SubLingual daily  heparin  Injectable 5000 Unit(s) SubCutaneous every 8 hours  influenza   Vaccine 0.5 milliLiter(s) IntraMuscular once  piperacillin/tazobactam IVPB. 3.375 Gram(s) IV Intermittent every 8 hours  vancomycin  IVPB 1000 milliGRAM(s) IV Intermittent every 8 hours    MEDICATIONS  (PRN):    T(C): 37.3 (04-29-18 @ 19:00), Max: 37.4 (04-29-18 @ 17:00)  HR: 62 (04-30-18 @ 06:00) (60 - 78)  BP: 93/58 (04-30-18 @ 06:00) (78/50 - 108/70)  BP(mean): 68 (04-30-18 @ 06:00) (58 - 80)  ABP: --  ABP(mean): --  RR: 18 (04-30-18 @ 06:00) (16 - 35)  SpO2: 98% (04-30-18 @ 06:00) (93% - 98%)  Wt(kg): --  CVP(mm Hg): 5 (04-29-18 @ 17:00) (2 - 6)  CI: --  CAPILLARY BLOOD GLUCOSE       N/A      04-29 @ 07:01  -  04-30 @ 07:00  --------------------------------------------------------  IN:    IV PiggyBack: 725 mL    Oral Fluid: 480 mL    Sodium Chloride 0.9% IV Bolus: 250 mL  Total IN: 1455 mL    OUT:    Voided: 1180 mL  Total OUT: 1180 mL    Total NET: 275 mL          PHYSICAL EXAM:      Constitutional: No acute distress  Neuro: A+OX3  Respiratory: CTA Bilaterally, scant bibasilar crackles  Cardiovascular: S1S2 RRR  Gastrointestinal: +bs  Extremities: No pedal edema  Vascular: +2 pedal pulses    TELEMETRY: SR    EKG:     CARDIAC CATH:       ECHO:  < from: US TTE 2D F/U, Limited w/o Contrast (ED) (04.28.18 @ 13:26) >  Procedure was performed in the Emergency Department by a credentialed   Emergency Medicine Attending Physician    EXAM:  ED TTE LIMITED [id number]      ORDER COMMENTS:      PROCEDURE DATE:  04/28/2018    FOCUSED ED ULTRASOUND REPORT          INTERPRETATION:  A focused transthoracic cardiac ultrasound examination   was performed.   No pericardial effusion was present.  Hypokinesis of the apex of the left ventricle  Bilateral diffuse anterior B lines seen  Prominent Right ventricle without evidence ofstrain    IMPRESSION:   No Pericardial Effusion.  Pulmonary edema      CAMERON SEWELL   This document has been electronically signed. Apr 28 2018  1:27PM    < end of copied text >    < from: TTE with Doppler (w/Cont) (04.28.18 @ 12:48) >    Patient name: JACQUE MAO  YOB: 1980   Age: 38 (M)   MR#: [id number]  Study Date: 4/28/2018  Location: [place] [address]  DSonographer: Gia oJ [facility]  2nd Sonographer: Demetria Haile M.D.  Study quality: Technically good  Referring Physician: Vernon Portillo MD  Blood Pressure: 107/79 mmHg  Height: 185 cm  Weight: 91 kg  BSA: 2.2 m2  ------------------------------------------------------------------------  PROCEDURE: Transthoracic echocardiogram with 2-D, M-Mode  and complete spectral and color flow Doppler. Verbal  consent was obtained for injection of echo contrast  following a discussion of risks and benefits. Following  intravenous injection of contrast, harmonic imaging was  performed.  INDICATION: Chest pain, unspecified (R07.9)  ------------------------------------------------------------------------  Dimensions:    Normal Values:  LA:     3.9    2.0 - 4.0 cm  Ao:     2.4    2.0 - 3.8 cm  SEPTUM: 0.8    0.6 - 1.2 cm  PWT:    0.8    0.6 - 1.1 cm  LVIDd:  4.8    3.0 - 5.6 cm  LVIDs:  3.5    1.8 - 4.0 cm  Derived variables:  LVMI: 59 g/m2  RWT: 0.33  Fractional short: 27 %  EF (Visual Estimate): 25 %  ------------------------------------------------------------------------  Observations:  Mitral Valve: Normal mitral valve. Mild mitral  regurgitation.  Aortic Valve/Aorta: Normal trileaflet aortic valve. LVOT  velocity time integral equals 17 cm.  Aortic Root: 2.4 cm.  LVOT diameter: 1.8 cm.  Left Atrium: Mildly dilated left atrium.  LA volume index =  37 cc/m2.  Left Ventricle: Severe segmental left ventricular systolic  dysfunction.  Akinesis of the mid to apical septum, apex,  distal lateral, inferolateral wall.   Endocardial  visualization enhanced with intravenous injection of echo  contrast (Definity). No left ventricular thrombus. Normal  left ventricular internal dimensions and wall thickness.  Moderate diastolic dysfunction (Stage II).  Right Heart: Normal right atrium. Right ventricular  enlargement with decreased right ventricular systolic  function. Normal tricuspid valve. Normal pulmonic valve.  Pericardium/Pleura: Normal pericardium with no pericardial  effusion.  Hemodynamic: Estimated right atrial pressure is 8 mm Hg.  Estimated right ventricular systolic pressure equals 31 mm  Hg,assuming right atrial pressure equals 8 mm Hg,  consistent with normal pulmonary pressures.  ------------------------------------------------------------------------  Conclusions:  1. Mildly dilated left atrium.  LA volume index = 37 cc/m2.  2. Normal left ventricular internal dimensions and wall  thickness.  3. Severe segmental left ventricular systolic dysfunction.  Akinesis of the mid to apical septum, apex,  distal  lateral, inferolateral wall.   Endocardial visualization  enhanced with intravenous injection of echo contrast  (Definity). No left ventricular thrombus.  4. Right ventricular enlargement with decreased right  ventricular systolic function.  *** Compared with echocardiogram of 11/8/2017, there is a  new wall motion abnormality.  ------------------------------------------------------------------------  Confirmed on  4/28/2018 - 16:50:22 by Mitch Boogie M.D.  ------------------------------------------------------------------------    < end of copied text >      IMAGING:     LABS:                        13.1   7.8   )-----------( 214      ( 30 Apr 2018 04:32 )             40.2       04-30    141  |  107  |  17  ----------------------------<  101<H>  4.1   |  24  |  0.82    Ca    9.1      30 Apr 2018 04:32  Phos  3.1     04-30  Mg     2.3     04-30    TPro  6.0  /  Alb  3.5  /  TBili  0.6  /  DBili  x   /  AST  38  /  ALT  35  /  AlkPhos  47  04-30      LIVER FUNCTIONS - ( 30 Apr 2018 04:32 )  Alb: 3.5 g/dL / Pro: 6.0 g/dL / ALK PHOS: 47 U/L / ALT: 35 U/L / AST: 38 U/L / GGT: x             Creatine Kinase, Serum: 497 U/L (04-29-18 @ 05:01)  Troponin T, Serum: 0.60 ng/mL (04-29-18 @ 05:01)  Creatine Kinase, Serum: 734 U/L (04-28-18 @ 20:13)  Troponin T, Serum: 0.72 ng/mL (04-28-18 @ 20:13)  Troponin T, Serum: 0.69 ng/mL (04-28-18 @ 15:47)  Creatine Kinase, Serum: 668 U/L (04-28-18 @ 15:47)  Creatine Kinase, Serum: 572 U/L (04-28-18 @ 12:10)  Troponin T, Serum: 0.71 ng/mL (04-28-18 @ 12:10)      PT/INR - ( 30 Apr 2018 04:32 )   PT: 12.5 sec;   INR: 1.15 ratio         PTT - ( 30 Apr 2018 04:32 )  PTT:28.8 sec

## 2018-04-30 NOTE — PROGRESS NOTE ADULT - ATTENDING COMMENTS
Patient is seen and examined with fellow, NP and the CCU house-staff. I agree with the history, physical and the assessment and plan.  possible stress induced CM vs focal myocarditis   cardiac MRI  will start asa 81 and low dose BB

## 2018-05-01 ENCOUNTER — TRANSCRIPTION ENCOUNTER (OUTPATIENT)
Age: 38
End: 2018-05-01

## 2018-05-01 VITALS — WEIGHT: 201.94 LBS

## 2018-05-01 LAB
ANION GAP SERPL CALC-SCNC: 15 MMOL/L — SIGNIFICANT CHANGE UP (ref 5–17)
BUN SERPL-MCNC: 20 MG/DL — SIGNIFICANT CHANGE UP (ref 7–23)
CALCIUM SERPL-MCNC: 10.1 MG/DL — SIGNIFICANT CHANGE UP (ref 8.4–10.5)
CHLORIDE SERPL-SCNC: 103 MMOL/L — SIGNIFICANT CHANGE UP (ref 96–108)
CO2 SERPL-SCNC: 24 MMOL/L — SIGNIFICANT CHANGE UP (ref 22–31)
CREAT SERPL-MCNC: 0.79 MG/DL — SIGNIFICANT CHANGE UP (ref 0.5–1.3)
GLUCOSE SERPL-MCNC: 98 MG/DL — SIGNIFICANT CHANGE UP (ref 70–99)
HCT VFR BLD CALC: 43.5 % — SIGNIFICANT CHANGE UP (ref 39–50)
HGB BLD-MCNC: 14.8 G/DL — SIGNIFICANT CHANGE UP (ref 13–17)
MAGNESIUM SERPL-MCNC: 2.4 MG/DL — SIGNIFICANT CHANGE UP (ref 1.6–2.6)
MCHC RBC-ENTMCNC: 29.2 PG — SIGNIFICANT CHANGE UP (ref 27–34)
MCHC RBC-ENTMCNC: 34 GM/DL — SIGNIFICANT CHANGE UP (ref 32–36)
MCV RBC AUTO: 85.8 FL — SIGNIFICANT CHANGE UP (ref 80–100)
PHOSPHATE SERPL-MCNC: 4.6 MG/DL — HIGH (ref 2.5–4.5)
PLATELET # BLD AUTO: 243 K/UL — SIGNIFICANT CHANGE UP (ref 150–400)
POTASSIUM SERPL-MCNC: 4 MMOL/L — SIGNIFICANT CHANGE UP (ref 3.5–5.3)
POTASSIUM SERPL-SCNC: 4 MMOL/L — SIGNIFICANT CHANGE UP (ref 3.5–5.3)
RBC # BLD: 5.07 M/UL — SIGNIFICANT CHANGE UP (ref 4.2–5.8)
RBC # FLD: 11.6 % — SIGNIFICANT CHANGE UP (ref 10.3–14.5)
SODIUM SERPL-SCNC: 142 MMOL/L — SIGNIFICANT CHANGE UP (ref 135–145)
WBC # BLD: 6.7 K/UL — SIGNIFICANT CHANGE UP (ref 3.8–10.5)
WBC # FLD AUTO: 6.7 K/UL — SIGNIFICANT CHANGE UP (ref 3.8–10.5)

## 2018-05-01 PROCEDURE — 99223 1ST HOSP IP/OBS HIGH 75: CPT | Mod: GC

## 2018-05-01 PROCEDURE — 99232 SBSQ HOSP IP/OBS MODERATE 35: CPT | Mod: GC

## 2018-05-01 PROCEDURE — 93306 TTE W/DOPPLER COMPLETE: CPT | Mod: 26

## 2018-05-01 RX ORDER — LISINOPRIL 2.5 MG/1
1 TABLET ORAL
Qty: 14 | Refills: 0 | OUTPATIENT
Start: 2018-05-01 | End: 2018-05-14

## 2018-05-01 RX ORDER — BUPRENORPHINE AND NALOXONE 2; .5 MG/1; MG/1
2 TABLET SUBLINGUAL
Qty: 0 | Refills: 0 | COMMUNITY

## 2018-05-01 RX ORDER — CARVEDILOL PHOSPHATE 80 MG/1
3.12 CAPSULE, EXTENDED RELEASE ORAL EVERY 12 HOURS
Qty: 0 | Refills: 0 | Status: DISCONTINUED | OUTPATIENT
Start: 2018-05-01 | End: 2018-05-01

## 2018-05-01 RX ORDER — ASPIRIN/CALCIUM CARB/MAGNESIUM 324 MG
81 TABLET ORAL DAILY
Qty: 0 | Refills: 0 | Status: DISCONTINUED | OUTPATIENT
Start: 2018-05-01 | End: 2018-05-01

## 2018-05-01 RX ORDER — FUROSEMIDE 40 MG
1 TABLET ORAL
Qty: 14 | Refills: 0 | OUTPATIENT
Start: 2018-05-01 | End: 2018-05-14

## 2018-05-01 RX ORDER — CARVEDILOL PHOSPHATE 80 MG/1
1 CAPSULE, EXTENDED RELEASE ORAL
Qty: 28 | Refills: 0 | OUTPATIENT
Start: 2018-05-01 | End: 2018-05-14

## 2018-05-01 RX ORDER — GABAPENTIN 400 MG/1
1 CAPSULE ORAL
Qty: 0 | Refills: 0 | COMMUNITY

## 2018-05-01 RX ORDER — LISINOPRIL 2.5 MG/1
2.5 TABLET ORAL DAILY
Qty: 0 | Refills: 0 | Status: DISCONTINUED | OUTPATIENT
Start: 2018-05-01 | End: 2018-05-01

## 2018-05-01 RX ORDER — ENOXAPARIN SODIUM 100 MG/ML
40 INJECTION SUBCUTANEOUS DAILY
Qty: 0 | Refills: 0 | Status: DISCONTINUED | OUTPATIENT
Start: 2018-05-01 | End: 2018-05-01

## 2018-05-01 RX ORDER — ASPIRIN/CALCIUM CARB/MAGNESIUM 324 MG
1 TABLET ORAL
Qty: 14 | Refills: 0 | OUTPATIENT
Start: 2018-05-01 | End: 2018-05-14

## 2018-05-01 RX ORDER — METOPROLOL TARTRATE 50 MG
25 TABLET ORAL DAILY
Qty: 0 | Refills: 0 | Status: DISCONTINUED | OUTPATIENT
Start: 2018-05-01 | End: 2018-05-01

## 2018-05-01 RX ADMIN — LISINOPRIL 2.5 MILLIGRAM(S): 2.5 TABLET ORAL at 10:21

## 2018-05-01 RX ADMIN — CARVEDILOL PHOSPHATE 3.12 MILLIGRAM(S): 80 CAPSULE, EXTENDED RELEASE ORAL at 17:28

## 2018-05-01 RX ADMIN — Medication 25 MILLIGRAM(S): at 10:21

## 2018-05-01 RX ADMIN — BUPRENORPHINE AND NALOXONE 1 TABLET(S): 2; .5 TABLET SUBLINGUAL at 11:15

## 2018-05-01 RX ADMIN — Medication 12.5 MILLIGRAM(S): at 05:36

## 2018-05-01 RX ADMIN — ENOXAPARIN SODIUM 40 MILLIGRAM(S): 100 INJECTION SUBCUTANEOUS at 11:15

## 2018-05-01 RX ADMIN — BUPRENORPHINE AND NALOXONE 1 TABLET(S): 2; .5 TABLET SUBLINGUAL at 12:11

## 2018-05-01 RX ADMIN — HEPARIN SODIUM 5000 UNIT(S): 5000 INJECTION INTRAVENOUS; SUBCUTANEOUS at 05:36

## 2018-05-01 RX ADMIN — Medication 20 MILLIGRAM(S): at 05:36

## 2018-05-01 RX ADMIN — Medication 81 MILLIGRAM(S): at 11:15

## 2018-05-01 NOTE — DISCHARGE NOTE ADULT - MEDICATION SUMMARY - MEDICATIONS TO STOP TAKING
I will STOP taking the medications listed below when I get home from the hospital:    Levaquin 750 mg oral tablet  -- 1 tab(s) by mouth once a day   -- Avoid prolonged or excessive exposure to direct and/or artificial sunlight while taking this medication.  Do not take dairy products, antacids, or iron preparations within one hour of this medication.  Finish all this medication unless otherwise directed by prescriber.  May cause drowsiness or dizziness.  Medication should be taken with plenty of water.

## 2018-05-01 NOTE — DISCHARGE NOTE ADULT - NS AS ACTIVITY OBS
Stairs allowed/Return to Work/School allowed/Walking-Indoors allowed/Showering allowed/Driving allowed/Bathing allowed/Walking-Outdoors allowed/No Heavy lifting/straining

## 2018-05-01 NOTE — DISCHARGE NOTE ADULT - PATIENT PORTAL LINK FT
You can access the [facility] Patient Portal, offered by [facility], by registering with the following website: [email or web address]

## 2018-05-01 NOTE — DISCHARGE NOTE ADULT - ADDITIONAL INSTRUCTIONS
Cardiologist from [facility] (Dr. Brandin Qureshi [phone]). Would also provide him with the General Cardiology ([phone])

## 2018-05-01 NOTE — PROGRESS NOTE ADULT - ATTENDING COMMENTS
Patient interviewed and examined.  Chart reviewed and note edited where appropriate.  Case discussed with fellow.  Agree w/ Assessment and Plan as outlined.  Would favor, give cocaine as the apparent offender, low dose combined alpha/Beta blocker, Carvedilol 3.125 mg bid.    Donald Bryant MD [facility]  Spectra:  [phone]  Office: [phone]

## 2018-05-01 NOTE — DISCHARGE NOTE ADULT - HOSPITAL COURSE
38M w/ no PMHx (prior hospitalization 11/2017 for PNA), Opiate abuse (on Suboxone) who presented to an OSH w/ CP and SOB and was Tx to [facility] w/ +biomarkers, TW changes (concerning for hyperacute TWs although unchanged on subsequent ECGs). The patient was found to be in acute HFrEF (EF:25%, +RVSD w/ hypokinesis of the mid-apical septum, apex, distal lateral and inferolateral walls). His LHC did not show evidence of CAD. The patient's Utox at [facility] confirms cocaine use (which pt is still denying). Suspect stress CM 2/2 cocaine use vs myocarditis. The patient is clinically euvolemic on exam and is refusing cMR. Transferred from CCU 5/1 and wants to sign out  AMA. 2 weeks of current medications given, pt assured me of follow up with cardiology within 2 days. State he understands the risks of signing out AMA. Forms signed. Hospitalist aware, Cardiology fellow aware.

## 2018-05-01 NOTE — CHART NOTE - NSCHARTNOTEFT_GEN_A_CORE
Medicine Accept Note- PGY-2  CCU transfer to Medicine Service    CCU COURSE: This is a 38 year old man with pasat medical history of prescription opiate dependence, currently in recovery on suboxone presents with shortness of breath. Pt reports several days of a non-productive cough. Last night began having sharp, mid sternal chest pain, non-radiating, unchanged by movement, exertion, or inspiration but improved with sitting forward that woke him up from sleep, associated w/ significant shortness of breath. Originally presented to [facility] where he was hypoxic to the 80s on room air w/ diffuse bilateral infiltrates concerning for pulmonary edema, associated w/ positive troponin and ST elevations on ECG. Received aspirin/plavix load and heparin gtt. Transferred to [facility] for further management. Required BiPAP initially but could not tolerate it, but still requiring non-rebreather to maintain saturations. Aside from cough, denies any fevers, chills, weakness or other recent illness. No sick contacts or travel. Denies any other substance use or IV drug use.    In ED at [facility], vitals at triage HR 78, BP 97/75, RR 18 w/ SpO2 88% on room air and rectal temp 101. Chest pain has since resolved but still short of breath requiring non-rebreather. Labs remarkable for WBC 19 w/ left shift, troponin T 0.71 and pro-BNP 1758. Bedside echocardiogram revealed apical hypokinesis w/ diffuse b-lines bilaterally. Started on heparin gtt, received 20mg lasix IVP, and given dose of zosyn. Admitted to CCU for management of hypoxic respiratory failure secondary to possible myocarditis vs stress myopathy.     Had cardiac catheterization on 4/28 that showed normal coronary arteries and a swan-alondra was placed. Was initially requiring high-flow nasal cannula but was weaned to room air after diuresis. BCT of the chest on 4/29 showed diffuse ground glass opacities consistent with pulmonary edema vs infection, however pt remained afebrile and leukocytosis resolved so antibiotics were discontinued on 4/30 as presentation now seems more primary cardiac. Started on low dose aspirin, metoprolol, with improvement in hemodynamics, now with systolics in low 100s. Planning for Cardiac MRI to evaluate for myocarditis    Interval Hx: Patient reports being transferred to floor this morning. denies chest pain, palpitations, sob, orthopnea, pnd, n/v/d. He reports coming to the hospital because of sob. He denies all substance abuse except suboxone.    PMH: reports depression/anxiety following w/ therapist, hx of suboxone use given hx of opioid dependence, hx of right ulnar nerve injury  was told had hyperthyroidism in the fall of 2017 by outpatient doctor but has never had abnormal blood test  PSH: Right carpal tunnel surgery and right ulnar nerve decompression  Home Medications: suboxone  Allergy: NKDA  Social Hx:  w/ child. denies tobacco hx has social EtOH use, denies all substance use. works in construction and as , lives in [place]  Family Hx: no hx of disease in primary family members    REVIEW OF SYSTEMS:  CONSTITUTIONAL: No fever, no chills  EYES: No eye pain, no visual disturbance  Mouth: no pain in mouth, no cuts  RESPIRATORY: No cough, No sob  CARDIOVASCULAR: No CP, no palpitations  GASTROINTESTINAL: no abdominal pain, no n/v/d  GENITOURINARY: No dysuria, no hematuria  NEUROLOGICAL: No headaches, no weakness  SKIN: No itching, no rashes  MUSCULOSKELETAL: No joint pain, no joint swelling    OBJECTIVE:  ICU Vital Signs Last 24 Hrs  T(C): 36.7 (01 May 2018 10:15), Max: 37 (30 Apr 2018 13:15)  T(F): 98 (01 May 2018 10:15), Max: 98.6 (30 Apr 2018 13:15)  HR: 67 (01 May 2018 10:15) (60 - 76)  BP: 123/82 (01 May 2018 10:15) (93/66 - 123/82)  BP(mean): 74 (01 May 2018 07:52) (74 - 89)  ABP: --  ABP(mean): --  RR: 18 (01 May 2018 10:15) (16 - 18)  SpO2: 97% (01 May 2018 10:15) (95% - 98%)        04-30 @ 07:01  -  05-01 @ 07:00  --------------------------------------------------------  IN: 320 mL / OUT: 1140 mL / NET: -820 mL    PHYSICAL EXAM:    GENERAL: NAD, well-developed  HEAD:  Atraumatic, Normocephalic  EYES: EOMI, PERRLA, conjunctiva and sclera clear  Mouth: MMM, no lesions  NECK: Supple, no appreciable masses, no JVD  Lung: normal work of breathing, cta b/l  Chest: S1&S2+, rrr, no m/r/g appreciated  ABDOMEN: bs+, soft, nt, nd, no appreciable masses  : No hart catheter, no CVA tenderness  EXTREMITIES:  radial pulse present b/l, PT present b/l, no pitting edema present  Neuro: A&Ox3, no focal deficits  SKIN: warm and dry, no visible rashes    HOSPITAL MEDICATIONS:  MEDICATIONS  (STANDING):  aspirin enteric coated 81 milliGRAM(s) Oral daily  buprenorphine 8 mG/naloxone 2 mG SL  Tablet 1 Tablet(s) SubLingual daily  carvedilol 3.125 milliGRAM(s) Oral every 12 hours  enoxaparin Injectable 40 milliGRAM(s) SubCutaneous daily  furosemide    Tablet 20 milliGRAM(s) Oral daily  influenza   Vaccine 0.5 milliLiter(s) IntraMuscular once  lisinopril 2.5 milliGRAM(s) Oral daily    MEDICATIONS  (PRN):      LABS:  (05-01 @ 05:46)                        14.8  6.7 )-----------( 243                 43.5    Neutrophils = -- (--%)  Lymphocytes = -- (--%)  Eosinophils = -- (--%)  Basophils = -- (--%)  Monocytes = -- (--%)  Bands = --%    WBC Trend: 6.7<--, 7.8<--, 9.9<--  Hb Trend: 14.8<--, 13.1<--, 13.0<--, 13.5<--, 14.9<--  Plt Trend: 243<--, 214<--, 221<--, 252<--, 253<--  05-01    142  |  103  |  20  ----------------------------<  98  4.0   |  24  |  0.79    Ca    10.1      01 May 2018 05:46  Phos  4.6     05-01  Mg     2.4     05-01    TPro  6.0  /  Alb  3.5  /  TBili  0.6  /  DBili  x   /  AST  38  /  ALT  35  /  AlkPhos  47  04-30    Creatinine Trend: 0.79<--, 0.82<--, 0.83<--, 0.88<--, 0.82<--, 0.89<--  PT/INR - ( 30 Apr 2018 04:32 )   PT: 12.5 sec;   INR: 1.15 ratio    PTT - ( 30 Apr 2018 04:32 )  PTT:28.8 sec    MICROBIOLOGY:   Rapid HIV-1/2 Antibody: Nonreact: The sensitivity and specificity of the assay are >99%.  A nonreactive result with the OraQuick Advance HIV 1/2 test ([employer]) does not preclude previous exposure or infection with  HIV 1/2. Reactive Rapid HIV screen results are confirmed by 4th  generation CMIA and Western Blot Assay. [place] State Law prohibits  redisclosure of this result to any unauthorized party. (04.28.18 @ 20:14)    Cocaine Metabolite, Urine: Positive: TEST REPEATED. (04.29.18 @ 11:58)  Oxycodone, Urine: Positive: (11.06.17 @ 12:32)      RADIOLOGY:  < from: CT Chest No Cont (04.29.18 @ 15:47) >  FINDINGS:  LUNGS AND LARGE AIRWAYS: Mild debris in the trachea. The central airways   otherwise patent. There are diffuse bilateral patchy and groundglass   opacities.  PLEURA: No pleural effusion.  VESSELS: Central venous catheter courses through the IVC, heart, and   terminates in the right pulmonary artery.  HEART: Heart size is normal. No pericardial effusion.  MEDIASTINUM AND JESSICA: 2.1 x 1.5 cm right paratracheal mediastinal lymph   node. Unchanged.  CHEST WALL AND LOWER NECK: Within normal limits.  VISUALIZED UPPER ABDOMEN: Within normal limits.  BONES: Within normal limits.  IMPRESSION:  Diffuse bilateral patchy ground glass opacities. Differential diagnosis   includes pulmonary edema and/or multifocal pneumonia.  < end of copied text >    < from: TTE with Doppler (w/Cont) (04.28.18 @ 12:48) >    PROCEDURE: Transthoracic echocardiogram with 2-D, M-Mode  and complete spectral and color flow Doppler. Verbal  consent was obtained for injection of echo contrast  following a discussion of risks and benefits. Following  intravenous injection of contrast, harmonic imaging was  performed.  INDICATION: Chest pain, unspecified (R07.9)  ------------------------------------------------------------------------  Dimensions:    Normal Values:  LA:     3.9    2.0 - 4.0 cm  Ao:     2.4    2.0 - 3.8 cm  SEPTUM: 0.8    0.6 - 1.2 cm  PWT:    0.8    0.6 - 1.1 cm  LVIDd:  4.8    3.0 - 5.6 cm  LVIDs:  3.5    1.8 - 4.0 cm  Derived variables:  LVMI: 59 g/m2  RWT: 0.33  Fractional short: 27 %  EF (Visual Estimate): 25 %  ------------------------------------------------------------------------  Observations:  Mitral Valve: Normal mitral valve. Mild mitral  regurgitation.  Aortic Valve/Aorta: Normal trileaflet aortic valve. LVOT  velocity time integral equals 17 cm.  Aortic Root: 2.4 cm.  LVOT diameter: 1.8 cm.  Left Atrium: Mildly dilated left atrium.  LA volume index =  37 cc/m2.  Left Ventricle: Severe segmental left ventricular systolic  dysfunction.  Akinesis of the mid to apical septum, apex,  distal lateral, inferolateral wall.   Endocardial  visualization enhanced with intravenous injection of echo  contrast (Definity). No left ventricular thrombus. Normal  left ventricular internal dimensions and wall thickness.  Moderate diastolic dysfunction (Stage II).  Right Heart: Normal right atrium. Right ventricular  enlargement with decreased right ventricular systolic  function. Normal tricuspid valve. Normal pulmonic valve.  Pericardium/Pleura: Normal pericardium with no pericardial  effusion.  Hemodynamic: Estimated right atrial pressure is 8 mm Hg.  Estimated right ventricular systolic pressure equals 31 mm  Hg,assuming right atrial pressure equals 8 mm Hg,  consistent with normal pulmonary pressures.  ------------------------------------------------------------------------  Conclusions:  1. Mildly dilated left atrium.  LA volume index = 37 cc/m2.  2. Normal left ventricular internal dimensions and wall  thickness.  3. Severe segmental left ventricular systolic dysfunction.  Akinesis of the mid to apical septum, apex,  distal  lateral, inferolateral wall.   Endocardial visualization  enhanced with intravenous injection of echo contrast  (Definity). No left ventricular thrombus.  4. Right ventricular enlargement with decreased right  ventricular systolic function.  *** Compared with echocardiogram of 11/8/2017, there is a  new wall motion abnormality.  < end of copied text >    < from: Cardiac Cath Lab - Adult (04.28.18 @ 17:31) >    PROCEDURE:  --  Sonosite - Diagnostic.  --  [place] Alondra Insertion.  --  Right heart catheterization.  --  Left coronary angiography.  --  Right coronary angiography.  --  Left heart catheterization with ventriculography.  --  Hemostasis with Perclose.  TECHNIQUE: The risks and alternatives of the procedures and conscious  sedation were explained to the patient and informed consent was obtained.  Cardiac catheterization performed emergently.  Sonosite - Diagnostic. Right femoral vein access. Local anesthetic given.  The puncture site was infiltrated with 2 % lidocaine. A 8.5FR ARROW ACCESS  was inserted in the vessel, utilizing the modified Seldinger technique.  Right femoral artery access. [place] Alondra Insertion. Right heart  catheterization. The procedure was performed utilizing a 7.5FR SWAN VIP  catheter. Left coronary artery angiography. The vessel was injected  utilizing a 5FR FL4.0 EXPO catheter. Right coronary artery angiography.  The vessel was injected utilizing a 5FR FR4.0 EXPO catheter. Left heart  catheterization. Ventriculography was performed. A 5FR  EXPO  catheter was utilized. Hemostasis with Perclose. RADIATION EXPOSURE: 8.8  min.  CONTRAST GIVEN: Omnipaque 79 ml.  MEDICATIONS GIVEN: Fentanyl, 25 mcg, IV. Midazolam, 1 mg, IV. Midazolam, 1  mg, IV.  VENTRICLES: EF estimated was 25 %. Relatively preserved function at the  base.  CORONARY VESSELS: The coronary circulation is co-dominant.  LM:   --  LM: Normal.  LAD:   --  LAD: Normal.  CX:   --  Circumflex: Normal.  RCA:   --  RCA: Normal.  COMPLICATIONS: There were no complications.  DIAGNOSTIC IMPRESSIONS: Diagnostic Impression:  - No obstructive coronary artery disease  - Acute systolic heart failure  - Normal filling pressures after initial diuresis prior to right heart cath  Diagnostic Recommendation:  - Optimize medical therapy for heart failure  - Agressive risk factor modification (history of opiate/cocaine abuse).  DIAGNOSTIC RECOMMENDATIONS: Diagnostic Impression:  - No obstructive coronary artery disease  - Acute systolic heart failure  - Normal filling pressures after initial diuresis prior to right heart cath  Diagnostic Recommendation:  - Optimize medical therapy for heart failure  - Agressive risk factor modification (history of opiate/cocaine abuse).  INTERVENTIONAL IMPRESSIONS: Diagnostic Impression:  - No obstructive coronary artery disease  - Acute systolic heart failure  - Normal filling pressures after initial diuresis prior to right heart cath  Diagnostic Recommendation:  - Optimize medical therapy for heart failure  - Agressive risk factor modification (history of opiate/cocaine abuse).  INTERVENTIONAL RECOMMENDATIONS: Diagnostic Impression:  - No obstructive coronary artery disease  - Acute systolic heart failure  - Normal filling pressures after initial diuresis prior to right heart cath  Diagnostic Recommendation:  - Optimize medical therapy for heart failure  - Agressive risk factor modification (history of opiate/cocaine abuse).  Prepared and signed by  Donald Martell M.D.  Signed 05/01/2018 07:41:26    < end of copied text >    EKG: NSR 66bpm w/ qwaves II,III, aVF w/o SUSAN but TWI in V5V6 [id number] on 4/29. Tele normal at time of exam NSR 70's    A/P  38M w/ hx of opiate abuse on suboxone, hx of b/l pneumonia (Nov2017), and depression/anxiety presented to an OSH w/ CP and SOB and was transferred to [facility] w/ +CE and EKG ischemic changes. He was found to have acute HFrEF (EF 25%) & LHC w/o CAD however tox screen positive for cocaine despite patient denying drug use. Patient stabilized in CCU and transferred to medicine service for further management of NICM w/ acute HFrEF suspected 2/2 myocarditis vs polysubstance abuse(cocaine).     #NICM HFrEF  - cardiology consult appreciated. c/w tele, ASA81, carvedilol, lisinopril, furosemide. Patient declining cardiac MRI and would like to follow up as outpatient.  - TSH nml, lipid panel wnl.  #Polysubstance abuse  - opiate dependence history and positive cocaine on tox screen. social work consult and continue w/ buprenorphine 8 mG/naloxone 2 mG SL  Tablet 1 Tablet(s) SubLingual daily  #Depression/anxiety  - patient declines medical therapy and reports working with outpatient therapist  #prophylactic measure  - c/w lovenox daily Medicine Accept Note- PGY-2  CCU transfer to Medicine Service    CCU COURSE: This is a 38 year old man with pasat medical history of prescription opiate dependence, currently in recovery on suboxone presents with shortness of breath. Pt reports several days of a non-productive cough. Last night began having sharp, mid sternal chest pain, non-radiating, unchanged by movement, exertion, or inspiration but improved with sitting forward that woke him up from sleep, associated w/ significant shortness of breath. Originally presented to [facility] where he was hypoxic to the 80s on room air w/ diffuse bilateral infiltrates concerning for pulmonary edema, associated w/ positive troponin and ST elevations on ECG. Received aspirin/plavix load and heparin gtt. Transferred to [facility] for further management. Required BiPAP initially but could not tolerate it, but still requiring non-rebreather to maintain saturations. Aside from cough, denies any fevers, chills, weakness or other recent illness. No sick contacts or travel. Denies any other substance use or IV drug use.    In ED at [facility], vitals at triage HR 78, BP 97/75, RR 18 w/ SpO2 88% on room air and rectal temp 101. Chest pain has since resolved but still short of breath requiring non-rebreather. Labs remarkable for WBC 19 w/ left shift, troponin T 0.71 and pro-BNP 1758. Bedside echocardiogram revealed apical hypokinesis w/ diffuse b-lines bilaterally. Started on heparin gtt, received 20mg lasix IVP, and given dose of zosyn. Admitted to CCU for management of hypoxic respiratory failure secondary to possible myocarditis vs stress myopathy.     Had cardiac catheterization on 4/28 that showed normal coronary arteries and a swan-alondra was placed. Was initially requiring high-flow nasal cannula but was weaned to room air after diuresis. BCT of the chest on 4/29 showed diffuse ground glass opacities consistent with pulmonary edema vs infection, however pt remained afebrile and leukocytosis resolved so antibiotics were discontinued on 4/30 as presentation now seems more primary cardiac. Started on low dose aspirin, metoprolol, with improvement in hemodynamics, now with systolics in low 100s. Planning for Cardiac MRI to evaluate for myocarditis    Interval Hx: Patient reports being transferred to floor this morning. denies chest pain, palpitations, sob, orthopnea, pnd, n/v/d. He reports coming to the hospital because of sob. He denies all substance abuse except suboxone.    PMH: reports depression/anxiety following w/ therapist, hx of suboxone use given hx of opioid dependence, hx of right ulnar nerve injury  was told had hyperthyroidism in the fall of 2017 by outpatient doctor but has never had abnormal blood test  PSH: Right carpal tunnel surgery and right ulnar nerve decompression  Home Medications: suboxone  Allergy: NKDA  Social Hx:  w/ child. denies tobacco hx has social EtOH use, denies all substance use. works in construction and as , lives in [place]  Family Hx: no hx of disease in primary family members    REVIEW OF SYSTEMS:  CONSTITUTIONAL: No fever, no chills  EYES: No eye pain, no visual disturbance  Mouth: no pain in mouth, no cuts  RESPIRATORY: No cough, No sob  CARDIOVASCULAR: No CP, no palpitations  GASTROINTESTINAL: no abdominal pain, no n/v/d  GENITOURINARY: No dysuria, no hematuria  NEUROLOGICAL: No headaches, no weakness  SKIN: No itching, no rashes  MUSCULOSKELETAL: No joint pain, no joint swelling    OBJECTIVE:  ICU Vital Signs Last 24 Hrs  T(C): 36.7 (01 May 2018 10:15), Max: 37 (30 Apr 2018 13:15)  T(F): 98 (01 May 2018 10:15), Max: 98.6 (30 Apr 2018 13:15)  HR: 67 (01 May 2018 10:15) (60 - 76)  BP: 123/82 (01 May 2018 10:15) (93/66 - 123/82)  BP(mean): 74 (01 May 2018 07:52) (74 - 89)  ABP: --  ABP(mean): --  RR: 18 (01 May 2018 10:15) (16 - 18)  SpO2: 97% (01 May 2018 10:15) (95% - 98%)        04-30 @ 07:01  -  05-01 @ 07:00  --------------------------------------------------------  IN: 320 mL / OUT: 1140 mL / NET: -820 mL    PHYSICAL EXAM:    GENERAL: NAD, well-developed  HEAD:  Atraumatic, Normocephalic  EYES: EOMI, PERRLA, conjunctiva and sclera clear  Mouth: MMM, no lesions  NECK: Supple, no appreciable masses, no JVD  Lung: normal work of breathing, cta b/l  Chest: S1&S2+, rrr, no m/r/g appreciated  ABDOMEN: bs+, soft, nt, nd, no appreciable masses  : No hart catheter, no CVA tenderness  EXTREMITIES:  radial pulse present b/l, PT present b/l, no pitting edema present  Neuro: A&Ox3, no focal deficits  SKIN: warm and dry, no visible rashes    HOSPITAL MEDICATIONS:  MEDICATIONS  (STANDING):  aspirin enteric coated 81 milliGRAM(s) Oral daily  buprenorphine 8 mG/naloxone 2 mG SL  Tablet 1 Tablet(s) SubLingual daily  carvedilol 3.125 milliGRAM(s) Oral every 12 hours  enoxaparin Injectable 40 milliGRAM(s) SubCutaneous daily  furosemide    Tablet 20 milliGRAM(s) Oral daily  influenza   Vaccine 0.5 milliLiter(s) IntraMuscular once  lisinopril 2.5 milliGRAM(s) Oral daily    MEDICATIONS  (PRN):      LABS:  (05-01 @ 05:46)                        14.8  6.7 )-----------( 243                 43.5    Neutrophils = -- (--%)  Lymphocytes = -- (--%)  Eosinophils = -- (--%)  Basophils = -- (--%)  Monocytes = -- (--%)  Bands = --%    WBC Trend: 6.7<--, 7.8<--, 9.9<--  Hb Trend: 14.8<--, 13.1<--, 13.0<--, 13.5<--, 14.9<--  Plt Trend: 243<--, 214<--, 221<--, 252<--, 253<--  05-01    142  |  103  |  20  ----------------------------<  98  4.0   |  24  |  0.79    Ca    10.1      01 May 2018 05:46  Phos  4.6     05-01  Mg     2.4     05-01    TPro  6.0  /  Alb  3.5  /  TBili  0.6  /  DBili  x   /  AST  38  /  ALT  35  /  AlkPhos  47  04-30    Creatinine Trend: 0.79<--, 0.82<--, 0.83<--, 0.88<--, 0.82<--, 0.89<--  PT/INR - ( 30 Apr 2018 04:32 )   PT: 12.5 sec;   INR: 1.15 ratio    PTT - ( 30 Apr 2018 04:32 )  PTT:28.8 sec    MICROBIOLOGY:   Rapid HIV-1/2 Antibody: Nonreact: The sensitivity and specificity of the assay are >99%.  A nonreactive result with the OraQuick Advance HIV 1/2 test ([employer]) does not preclude previous exposure or infection with  HIV 1/2. Reactive Rapid HIV screen results are confirmed by 4th  generation CMIA and Western Blot Assay. [place] State Law prohibits  redisclosure of this result to any unauthorized party. (04.28.18 @ 20:14)    Cocaine Metabolite, Urine: Positive: TEST REPEATED. (04.29.18 @ 11:58)  Oxycodone, Urine: Positive: (11.06.17 @ 12:32)      RADIOLOGY:  < from: CT Chest No Cont (04.29.18 @ 15:47) >  FINDINGS:  LUNGS AND LARGE AIRWAYS: Mild debris in the trachea. The central airways   otherwise patent. There are diffuse bilateral patchy and groundglass   opacities.  PLEURA: No pleural effusion.  VESSELS: Central venous catheter courses through the IVC, heart, and   terminates in the right pulmonary artery.  HEART: Heart size is normal. No pericardial effusion.  MEDIASTINUM AND JESSICA: 2.1 x 1.5 cm right paratracheal mediastinal lymph   node. Unchanged.  CHEST WALL AND LOWER NECK: Within normal limits.  VISUALIZED UPPER ABDOMEN: Within normal limits.  BONES: Within normal limits.  IMPRESSION:  Diffuse bilateral patchy ground glass opacities. Differential diagnosis   includes pulmonary edema and/or multifocal pneumonia.  < end of copied text >    < from: TTE with Doppler (w/Cont) (04.28.18 @ 12:48) >    PROCEDURE: Transthoracic echocardiogram with 2-D, M-Mode  and complete spectral and color flow Doppler. Verbal  consent was obtained for injection of echo contrast  following a discussion of risks and benefits. Following  intravenous injection of contrast, harmonic imaging was  performed.  INDICATION: Chest pain, unspecified (R07.9)  ------------------------------------------------------------------------  Dimensions:    Normal Values:  LA:     3.9    2.0 - 4.0 cm  Ao:     2.4    2.0 - 3.8 cm  SEPTUM: 0.8    0.6 - 1.2 cm  PWT:    0.8    0.6 - 1.1 cm  LVIDd:  4.8    3.0 - 5.6 cm  LVIDs:  3.5    1.8 - 4.0 cm  Derived variables:  LVMI: 59 g/m2  RWT: 0.33  Fractional short: 27 %  EF (Visual Estimate): 25 %  ------------------------------------------------------------------------  Observations:  Mitral Valve: Normal mitral valve. Mild mitral  regurgitation.  Aortic Valve/Aorta: Normal trileaflet aortic valve. LVOT  velocity time integral equals 17 cm.  Aortic Root: 2.4 cm.  LVOT diameter: 1.8 cm.  Left Atrium: Mildly dilated left atrium.  LA volume index =  37 cc/m2.  Left Ventricle: Severe segmental left ventricular systolic  dysfunction.  Akinesis of the mid to apical septum, apex,  distal lateral, inferolateral wall.   Endocardial  visualization enhanced with intravenous injection of echo  contrast (Definity). No left ventricular thrombus. Normal  left ventricular internal dimensions and wall thickness.  Moderate diastolic dysfunction (Stage II).  Right Heart: Normal right atrium. Right ventricular  enlargement with decreased right ventricular systolic  function. Normal tricuspid valve. Normal pulmonic valve.  Pericardium/Pleura: Normal pericardium with no pericardial  effusion.  Hemodynamic: Estimated right atrial pressure is 8 mm Hg.  Estimated right ventricular systolic pressure equals 31 mm  Hg,assuming right atrial pressure equals 8 mm Hg,  consistent with normal pulmonary pressures.  ------------------------------------------------------------------------  Conclusions:  1. Mildly dilated left atrium.  LA volume index = 37 cc/m2.  2. Normal left ventricular internal dimensions and wall  thickness.  3. Severe segmental left ventricular systolic dysfunction.  Akinesis of the mid to apical septum, apex,  distal  lateral, inferolateral wall.   Endocardial visualization  enhanced with intravenous injection of echo contrast  (Definity). No left ventricular thrombus.  4. Right ventricular enlargement with decreased right  ventricular systolic function.  *** Compared with echocardiogram of 11/8/2017, there is a  new wall motion abnormality.  < end of copied text >    < from: Cardiac Cath Lab - Adult (04.28.18 @ 17:31) >    PROCEDURE:  --  Sonosite - Diagnostic.  --  [place] Alondra Insertion.  --  Right heart catheterization.  --  Left coronary angiography.  --  Right coronary angiography.  --  Left heart catheterization with ventriculography.  --  Hemostasis with Perclose.  TECHNIQUE: The risks and alternatives of the procedures and conscious  sedation were explained to the patient and informed consent was obtained.  Cardiac catheterization performed emergently.  Sonosite - Diagnostic. Right femoral vein access. Local anesthetic given.  The puncture site was infiltrated with 2 % lidocaine. A 8.5FR ARROW ACCESS  was inserted in the vessel, utilizing the modified Seldinger technique.  Right femoral artery access. [place] Alondra Insertion. Right heart  catheterization. The procedure was performed utilizing a 7.5FR SWAN VIP  catheter. Left coronary artery angiography. The vessel was injected  utilizing a 5FR FL4.0 EXPO catheter. Right coronary artery angiography.  The vessel was injected utilizing a 5FR FR4.0 EXPO catheter. Left heart  catheterization. Ventriculography was performed. A 5FR  EXPO  catheter was utilized. Hemostasis with Perclose. RADIATION EXPOSURE: 8.8  min.  CONTRAST GIVEN: Omnipaque 79 ml.  MEDICATIONS GIVEN: Fentanyl, 25 mcg, IV. Midazolam, 1 mg, IV. Midazolam, 1  mg, IV.  VENTRICLES: EF estimated was 25 %. Relatively preserved function at the  base.  CORONARY VESSELS: The coronary circulation is co-dominant.  LM:   --  LM: Normal.  LAD:   --  LAD: Normal.  CX:   --  Circumflex: Normal.  RCA:   --  RCA: Normal.  COMPLICATIONS: There were no complications.  DIAGNOSTIC IMPRESSIONS: Diagnostic Impression:  - No obstructive coronary artery disease  - Acute systolic heart failure  - Normal filling pressures after initial diuresis prior to right heart cath  Diagnostic Recommendation:  - Optimize medical therapy for heart failure  - Agressive risk factor modification (history of opiate/cocaine abuse).  DIAGNOSTIC RECOMMENDATIONS: Diagnostic Impression:  - No obstructive coronary artery disease  - Acute systolic heart failure  - Normal filling pressures after initial diuresis prior to right heart cath  Diagnostic Recommendation:  - Optimize medical therapy for heart failure  - Agressive risk factor modification (history of opiate/cocaine abuse).  INTERVENTIONAL IMPRESSIONS: Diagnostic Impression:  - No obstructive coronary artery disease  - Acute systolic heart failure  - Normal filling pressures after initial diuresis prior to right heart cath  Diagnostic Recommendation:  - Optimize medical therapy for heart failure  - Agressive risk factor modification (history of opiate/cocaine abuse).  INTERVENTIONAL RECOMMENDATIONS: Diagnostic Impression:  - No obstructive coronary artery disease  - Acute systolic heart failure  - Normal filling pressures after initial diuresis prior to right heart cath  Diagnostic Recommendation:  - Optimize medical therapy for heart failure  - Agressive risk factor modification (history of opiate/cocaine abuse).  Prepared and signed by  Donald Martell M.D.  Signed 05/01/2018 07:41:26    < end of copied text >    EKG: NSR 66bpm w/ qwaves II,III, aVF w/o SUSAN but TWI in V5V6 [id number] on 4/29. Tele normal at time of exam NSR 70's    A/P  38M w/ hx of opiate abuse on suboxone, hx of b/l pneumonia (Nov2017), and depression/anxiety presented to an OSH w/ CP and SOB and was transferred to [facility] w/ +CE and EKG ischemic changes. He was found to have acute HFrEF (EF 25%) & LHC w/o CAD however tox screen positive for cocaine despite patient denying drug use. Patient stabilized in CCU and transferred to medicine service for further management of NICM w/ acute HFrEF suspected 2/2 myocarditis vs polysubstance abuse(cocaine).     #NICM HFrEF  - cardiology consult appreciated. c/w tele, ASA81, carvedilol, lisinopril, furosemide. Patient declining cardiac MRI and would like to follow up as outpatient.  - pending TTE w/ definity today per cards  - TSH nml, lipid panel wnl.  #Polysubstance abuse  - opiate dependence history and positive cocaine on tox screen. social work consult and continue w/ buprenorphine 8 mG/naloxone 2 mG SL  Tablet 1 Tablet(s) SubLingual daily  #Depression/anxiety  - patient declines medical therapy and reports working with outpatient therapist  #prophylactic measure  - c/w lovenox daily Medicine Accept Note- PGY-2  CCU transfer to Medicine Service    CCU COURSE: This is a 38 year old man with pasat medical history of prescription opiate dependence, currently in recovery on suboxone presents with shortness of breath. Pt reports several days of a non-productive cough. Last night began having sharp, mid sternal chest pain, non-radiating, unchanged by movement, exertion, or inspiration but improved with sitting forward that woke him up from sleep, associated w/ significant shortness of breath. Originally presented to [facility] where he was hypoxic to the 80s on room air w/ diffuse bilateral infiltrates concerning for pulmonary edema, associated w/ positive troponin and ST elevations on ECG. Received aspirin/plavix load and heparin gtt. Transferred to [facility] for further management. Required BiPAP initially but could not tolerate it, but still requiring non-rebreather to maintain saturations. Aside from cough, denies any fevers, chills, weakness or other recent illness. No sick contacts or travel. Denies any other substance use or IV drug use.    In ED at [facility], vitals at triage HR 78, BP 97/75, RR 18 w/ SpO2 88% on room air and rectal temp 101. Chest pain has since resolved but still short of breath requiring non-rebreather. Labs remarkable for WBC 19 w/ left shift, troponin T 0.71 and pro-BNP 1758. Bedside echocardiogram revealed apical hypokinesis w/ diffuse b-lines bilaterally. Started on heparin gtt, received 20mg lasix IVP, and given dose of zosyn. Admitted to CCU for management of hypoxic respiratory failure secondary to possible myocarditis vs stress myopathy.     Had cardiac catheterization on 4/28 that showed normal coronary arteries and a swan-alondra was placed. Was initially requiring high-flow nasal cannula but was weaned to room air after diuresis. BCT of the chest on 4/29 showed diffuse ground glass opacities consistent with pulmonary edema vs infection, however pt remained afebrile and leukocytosis resolved so antibiotics were discontinued on 4/30 as presentation now seems more primary cardiac. Started on low dose aspirin, metoprolol, with improvement in hemodynamics, now with systolics in low 100s. Planning for Cardiac MRI to evaluate for myocarditis    Interval Hx: Patient reports being transferred to floor this morning. denies chest pain, palpitations, sob, orthopnea, pnd, n/v/d. He reports coming to the hospital because of sob. He denies all substance abuse except suboxone.    PMH: reports depression/anxiety following w/ therapist, hx of suboxone use given hx of opioid dependence, hx of right ulnar nerve injury  was told had hyperthyroidism in the fall of 2017 by outpatient doctor but has never had abnormal blood test  PSH: Right carpal tunnel surgery and right ulnar nerve decompression  Home Medications: suboxone  Allergy: NKDA  Social Hx:  w/ child. denies tobacco hx has social EtOH use, denies all substance use. works in construction and as , lives in [place]  Family Hx: no hx of disease in primary family members    REVIEW OF SYSTEMS:  CONSTITUTIONAL: No fever, no chills  EYES: No eye pain, no visual disturbance  Mouth: no pain in mouth, no cuts  RESPIRATORY: No cough, No sob  CARDIOVASCULAR: No CP, no palpitations  GASTROINTESTINAL: no abdominal pain, no n/v/d  GENITOURINARY: No dysuria, no hematuria  NEUROLOGICAL: No headaches, no weakness  SKIN: No itching, no rashes  MUSCULOSKELETAL: No joint pain, no joint swelling    OBJECTIVE:  ICU Vital Signs Last 24 Hrs  T(C): 36.7 (01 May 2018 10:15), Max: 37 (30 Apr 2018 13:15)  T(F): 98 (01 May 2018 10:15), Max: 98.6 (30 Apr 2018 13:15)  HR: 67 (01 May 2018 10:15) (60 - 76)  BP: 123/82 (01 May 2018 10:15) (93/66 - 123/82)  BP(mean): 74 (01 May 2018 07:52) (74 - 89)  ABP: --  ABP(mean): --  RR: 18 (01 May 2018 10:15) (16 - 18)  SpO2: 97% (01 May 2018 10:15) (95% - 98%)        04-30 @ 07:01  -  05-01 @ 07:00  --------------------------------------------------------  IN: 320 mL / OUT: 1140 mL / NET: -820 mL    PHYSICAL EXAM:    GENERAL: NAD, well-developed  HEAD:  Atraumatic, Normocephalic  EYES: EOMI, PERRLA, conjunctiva and sclera clear  Mouth: MMM, no lesions  NECK: Supple, no appreciable masses, no JVD  Lung: normal work of breathing, cta b/l  Chest: S1&S2+, rrr, no m/r/g appreciated  ABDOMEN: bs+, soft, nt, nd, no appreciable masses  : No hart catheter, no CVA tenderness  EXTREMITIES:  radial pulse present b/l, PT present b/l, no pitting edema present  Neuro: A&Ox3, no focal deficits  SKIN: warm and dry, no visible rashes    HOSPITAL MEDICATIONS:  MEDICATIONS  (STANDING):  aspirin enteric coated 81 milliGRAM(s) Oral daily  buprenorphine 8 mG/naloxone 2 mG SL  Tablet 1 Tablet(s) SubLingual daily  carvedilol 3.125 milliGRAM(s) Oral every 12 hours  enoxaparin Injectable 40 milliGRAM(s) SubCutaneous daily  furosemide    Tablet 20 milliGRAM(s) Oral daily  influenza   Vaccine 0.5 milliLiter(s) IntraMuscular once  lisinopril 2.5 milliGRAM(s) Oral daily    MEDICATIONS  (PRN):      LABS:  (05-01 @ 05:46)                        14.8  6.7 )-----------( 243                 43.5    Neutrophils = -- (--%)  Lymphocytes = -- (--%)  Eosinophils = -- (--%)  Basophils = -- (--%)  Monocytes = -- (--%)  Bands = --%    WBC Trend: 6.7<--, 7.8<--, 9.9<--  Hb Trend: 14.8<--, 13.1<--, 13.0<--, 13.5<--, 14.9<--  Plt Trend: 243<--, 214<--, 221<--, 252<--, 253<--  05-01    142  |  103  |  20  ----------------------------<  98  4.0   |  24  |  0.79    Ca    10.1      01 May 2018 05:46  Phos  4.6     05-01  Mg     2.4     05-01    TPro  6.0  /  Alb  3.5  /  TBili  0.6  /  DBili  x   /  AST  38  /  ALT  35  /  AlkPhos  47  04-30    Creatinine Trend: 0.79<--, 0.82<--, 0.83<--, 0.88<--, 0.82<--, 0.89<--  PT/INR - ( 30 Apr 2018 04:32 )   PT: 12.5 sec;   INR: 1.15 ratio    PTT - ( 30 Apr 2018 04:32 )  PTT:28.8 sec    MICROBIOLOGY:   Rapid HIV-1/2 Antibody: Nonreact: The sensitivity and specificity of the assay are >99%.  A nonreactive result with the OraQuick Advance HIV 1/2 test ([employer]) does not preclude previous exposure or infection with  HIV 1/2. Reactive Rapid HIV screen results are confirmed by 4th  generation CMIA and Western Blot Assay. [place] State Law prohibits  redisclosure of this result to any unauthorized party. (04.28.18 @ 20:14)    Cocaine Metabolite, Urine: Positive: TEST REPEATED. (04.29.18 @ 11:58)  Oxycodone, Urine: Positive: (11.06.17 @ 12:32)      RADIOLOGY:  < from: CT Chest No Cont (04.29.18 @ 15:47) >  FINDINGS:  LUNGS AND LARGE AIRWAYS: Mild debris in the trachea. The central airways   otherwise patent. There are diffuse bilateral patchy and groundglass   opacities.  PLEURA: No pleural effusion.  VESSELS: Central venous catheter courses through the IVC, heart, and   terminates in the right pulmonary artery.  HEART: Heart size is normal. No pericardial effusion.  MEDIASTINUM AND JESSICA: 2.1 x 1.5 cm right paratracheal mediastinal lymph   node. Unchanged.  CHEST WALL AND LOWER NECK: Within normal limits.  VISUALIZED UPPER ABDOMEN: Within normal limits.  BONES: Within normal limits.  IMPRESSION:  Diffuse bilateral patchy ground glass opacities. Differential diagnosis   includes pulmonary edema and/or multifocal pneumonia.  < end of copied text >    < from: TTE with Doppler (w/Cont) (04.28.18 @ 12:48) >    PROCEDURE: Transthoracic echocardiogram with 2-D, M-Mode  and complete spectral and color flow Doppler. Verbal  consent was obtained for injection of echo contrast  following a discussion of risks and benefits. Following  intravenous injection of contrast, harmonic imaging was  performed.  INDICATION: Chest pain, unspecified (R07.9)  ------------------------------------------------------------------------  Dimensions:    Normal Values:  LA:     3.9    2.0 - 4.0 cm  Ao:     2.4    2.0 - 3.8 cm  SEPTUM: 0.8    0.6 - 1.2 cm  PWT:    0.8    0.6 - 1.1 cm  LVIDd:  4.8    3.0 - 5.6 cm  LVIDs:  3.5    1.8 - 4.0 cm  Derived variables:  LVMI: 59 g/m2  RWT: 0.33  Fractional short: 27 %  EF (Visual Estimate): 25 %  ------------------------------------------------------------------------  Observations:  Mitral Valve: Normal mitral valve. Mild mitral  regurgitation.  Aortic Valve/Aorta: Normal trileaflet aortic valve. LVOT  velocity time integral equals 17 cm.  Aortic Root: 2.4 cm.  LVOT diameter: 1.8 cm.  Left Atrium: Mildly dilated left atrium.  LA volume index =  37 cc/m2.  Left Ventricle: Severe segmental left ventricular systolic  dysfunction.  Akinesis of the mid to apical septum, apex,  distal lateral, inferolateral wall.   Endocardial  visualization enhanced with intravenous injection of echo  contrast (Definity). No left ventricular thrombus. Normal  left ventricular internal dimensions and wall thickness.  Moderate diastolic dysfunction (Stage II).  Right Heart: Normal right atrium. Right ventricular  enlargement with decreased right ventricular systolic  function. Normal tricuspid valve. Normal pulmonic valve.  Pericardium/Pleura: Normal pericardium with no pericardial  effusion.  Hemodynamic: Estimated right atrial pressure is 8 mm Hg.  Estimated right ventricular systolic pressure equals 31 mm  Hg,assuming right atrial pressure equals 8 mm Hg,  consistent with normal pulmonary pressures.  ------------------------------------------------------------------------  Conclusions:  1. Mildly dilated left atrium.  LA volume index = 37 cc/m2.  2. Normal left ventricular internal dimensions and wall  thickness.  3. Severe segmental left ventricular systolic dysfunction.  Akinesis of the mid to apical septum, apex,  distal  lateral, inferolateral wall.   Endocardial visualization  enhanced with intravenous injection of echo contrast  (Definity). No left ventricular thrombus.  4. Right ventricular enlargement with decreased right  ventricular systolic function.  *** Compared with echocardiogram of 11/8/2017, there is a  new wall motion abnormality.  < end of copied text >    < from: Cardiac Cath Lab - Adult (04.28.18 @ 17:31) >    PROCEDURE:  --  Sonosite - Diagnostic.  --  [place] Alondra Insertion.  --  Right heart catheterization.  --  Left coronary angiography.  --  Right coronary angiography.  --  Left heart catheterization with ventriculography.  --  Hemostasis with Perclose.  TECHNIQUE: The risks and alternatives of the procedures and conscious  sedation were explained to the patient and informed consent was obtained.  Cardiac catheterization performed emergently.  Sonosite - Diagnostic. Right femoral vein access. Local anesthetic given.  The puncture site was infiltrated with 2 % lidocaine. A 8.5FR ARROW ACCESS  was inserted in the vessel, utilizing the modified Seldinger technique.  Right femoral artery access. [place] Alondra Insertion. Right heart  catheterization. The procedure was performed utilizing a 7.5FR SWAN VIP  catheter. Left coronary artery angiography. The vessel was injected  utilizing a 5FR FL4.0 EXPO catheter. Right coronary artery angiography.  The vessel was injected utilizing a 5FR FR4.0 EXPO catheter. Left heart  catheterization. Ventriculography was performed. A 5FR  EXPO  catheter was utilized. Hemostasis with Perclose. RADIATION EXPOSURE: 8.8  min.  CONTRAST GIVEN: Omnipaque 79 ml.  MEDICATIONS GIVEN: Fentanyl, 25 mcg, IV. Midazolam, 1 mg, IV. Midazolam, 1  mg, IV.  VENTRICLES: EF estimated was 25 %. Relatively preserved function at the  base.  CORONARY VESSELS: The coronary circulation is co-dominant.  LM:   --  LM: Normal.  LAD:   --  LAD: Normal.  CX:   --  Circumflex: Normal.  RCA:   --  RCA: Normal.  COMPLICATIONS: There were no complications.  DIAGNOSTIC IMPRESSIONS: Diagnostic Impression:  - No obstructive coronary artery disease  - Acute systolic heart failure  - Normal filling pressures after initial diuresis prior to right heart cath  Diagnostic Recommendation:  - Optimize medical therapy for heart failure  - Agressive risk factor modification (history of opiate/cocaine abuse).  DIAGNOSTIC RECOMMENDATIONS: Diagnostic Impression:  - No obstructive coronary artery disease  - Acute systolic heart failure  - Normal filling pressures after initial diuresis prior to right heart cath  Diagnostic Recommendation:  - Optimize medical therapy for heart failure  - Agressive risk factor modification (history of opiate/cocaine abuse).  INTERVENTIONAL IMPRESSIONS: Diagnostic Impression:  - No obstructive coronary artery disease  - Acute systolic heart failure  - Normal filling pressures after initial diuresis prior to right heart cath  Diagnostic Recommendation:  - Optimize medical therapy for heart failure  - Agressive risk factor modification (history of opiate/cocaine abuse).  INTERVENTIONAL RECOMMENDATIONS: Diagnostic Impression:  - No obstructive coronary artery disease  - Acute systolic heart failure  - Normal filling pressures after initial diuresis prior to right heart cath  Diagnostic Recommendation:  - Optimize medical therapy for heart failure  - Agressive risk factor modification (history of opiate/cocaine abuse).  Prepared and signed by  Donald Martell M.D.  Signed 05/01/2018 07:41:26    < end of copied text >    Telemetry reviewed: Sinus 70-80's.     EKG reviewed by me: NSR 66bpm w/ qwaves II,III, aVF w/o SUSAN but TWI in V5V6 [id number] on 4/29. Tele normal at time of exam NSR 70's    A/P  38M w/ hx of opiate abuse on suboxone, hx of b/l pneumonia (Nov2017), and depression/anxiety presented to an OSH w/ CP and SOB and was transferred to [facility] w/ +CE and EKG ischemic changes. He was found to have acute HFrEF (EF 25%) & LHC w/o CAD however tox screen positive for cocaine despite patient denying drug use. Patient stabilized in CCU and transferred to medicine service for further management of NICM w/ acute HFrEF suspected 2/2 myocarditis vs polysubstance abuse(cocaine).     #NICM HFrEF  - cardiology consult appreciated. c/w tele, ASA81, carvedilol, lisinopril, furosemide. Patient declining cardiac MRI and would like to follow up as outpatient.  - pending TTE w/ definity today per cards  - TSH nml, lipid panel wnl. -Check free T4 in am.  #Polysubstance abuse  - opiate dependence history and positive cocaine on tox screen. social work consult and continue w/ buprenorphine 8 mG/naloxone 2 mG SL  Tablet 1 Tablet(s) SubLingual daily  #Depression/anxiety  - patient declines medical therapy and reports working with outpatient therapist  #prophylactic measure  - c/w lovenox daily      ATTENDING ATTESTATION:    -Patient seen/examined by myself on 5/1/18. Case/plan discussed with Dr. Edward as reviewed/edited by me above.   -Patient now feels well; denies CP, SOB, no LE edema, lungs clear. No events on telemetry.   -Continue to monitor for 24 hours after adjustments in cardiac meds. Echo pending. Cardio recs appreciated.   -SW consulted for history of polysubstance abuse. UTox positive for cocaine, benzos, and oxycodone; though patient denies. Says he was with some friends that night who maybe "slipped him something". I counseled patient on avoiding these substances and also alcohol. He says he has been under a lot of stress lately with his job and his child so has been drinking a little more than usual. Advised him to follow up with the therapist at the suboxone clinic and to consider starting anti-depressant/anti-anxiety meds as an outpatient. May need to contact his suboxone center with his permission.   -Advised close outpatient follow up with the cardio fellows clinic upon discharge. Patient has Medicaid currently.    Vargas Garland MD  Division of Hospital Medicine  Cell: [phone]  Pager: [phone]  Office: [phone]

## 2018-05-01 NOTE — DISCHARGE NOTE ADULT - CARE PLAN
Principal Discharge DX:	Other myocarditis  Goal:	Hypoxic Respiratory Failure  Assessment and plan of treatment:	Cardiologist from [place] (Dr. Ghotra Handler [phone]). Would also provide him with the General Cardiology ([phone])  Avoid fatty and salty foods   Please take daily weights prior to your first void daily   Please take your medications as prescribed

## 2018-05-01 NOTE — DISCHARGE NOTE ADULT - MEDICATION SUMMARY - MEDICATIONS TO TAKE
I will START or STAY ON the medications listed below when I get home from the hospital:    Suboxone 8 mg-2 mg sublingual tablet  -- 2 tab(s) under tongue once a day  -- Indication: For Pain management    Aspirin Enteric Coated 81 mg oral delayed release tablet  -- 1 tab(s) by mouth once a day   -- Swallow whole.  Do not crush.  Take with food or milk.    -- Indication: For heart protective     lisinopril 2.5 mg oral tablet  -- 1 tab(s) by mouth once a day   -- Do not take this drug if you are pregnant.  It is very important that you take or use this exactly as directed.  Do not skip doses or discontinue unless directed by your doctor.  Some non-prescription drugs may aggravate your condition.  Read all labels carefully.  If a warning appears, check with your doctor before taking.    -- Indication: For blood pressure     carvedilol 3.125 mg oral tablet  -- 1 tab(s) by mouth every 12 hours   -- It is very important that you take or use this exactly as directed.  Do not skip doses or discontinue unless directed by your doctor.  May cause drowsiness.  Alcohol may intensify this effect.  Use care when operating dangerous machinery.  Some non-prescription drugs may aggravate your condition.  Read all labels carefully.  If a warning appears, check with your doctor before taking.  Take with food or milk.    -- Indication: For blood pressure     furosemide 20 mg oral tablet  -- 1 tab(s) by mouth once a day   -- Avoid prolonged or excessive exposure to direct and/or artificial sunlight while taking this medication.  It is very important that you take or use this exactly as directed.  Do not skip doses or discontinue unless directed by your doctor.  It may be advisable to drink a full glass orange juice or eat a banana daily while taking this medication.    -- Indication: For fluid retention

## 2018-05-01 NOTE — DISCHARGE NOTE ADULT - PLAN OF CARE
Hypoxic Respiratory Failure Cardiologist from [place] (Dr. Ghotra Handler [phone]). Would also provide him with the General Cardiology ([phone])  Avoid fatty and salty foods   Please take daily weights prior to your first void daily   Please take your medications as prescribed

## 2018-05-01 NOTE — DISCHARGE NOTE ADULT - CARE PROVIDER_API CALL
Brandin Qureshi (MD), Cardiovascular Disease; Internal Medicine  [address] [place] [phone]  Phone: [phone]  Fax: [phone]

## 2018-05-01 NOTE — PROGRESS NOTE ADULT - SUBJECTIVE AND OBJECTIVE BOX
Patient seen and examined at bedside on 2 DSU    Overnight Events: The patient was transferred out of the CCU this morning. ABxs were stopped in the CCU    Review Of Systems: No chest pain, shortness of breath, or palpitations. He reports that he is afraid of the MRI machine and will not have the test done this admission. He would like to be discharged today     Current Meds:  aspirin enteric coated 81 milliGRAM(s) Oral daily  buprenorphine 8 mG/naloxone 2 mG SL  Tablet 1 Tablet(s) SubLingual daily  furosemide    Tablet 20 milliGRAM(s) Oral daily  heparin  Injectable 5000 Unit(s) SubCutaneous every 8 hours  influenza   Vaccine 0.5 milliLiter(s) IntraMuscular once  metoprolol tartrate 12.5 milliGRAM(s) Oral two times a day    Vitals:  T(F): 98.3 (05-01), Max: 98.6 (04-30)  HR: 66 (05-01) (60 - 76)  BP: 96/61 (05-01) (93/66 - 117/72)  RR: 16 (05-01)  SpO2: 95% on RA    I&O's Summary    30 Apr 2018 07:01  -  01 May 2018 07:00  --------------------------------------------------------  IN: 320 mL / OUT: 1140 mL / NET: -820 mL    Physical Exam:  Appearance: No acute distress; well appearing, walking around the room  Eyes: PERRL, EOMI, pink conjunctiva  HENT: Normal oral mucosa  Cardiovascular: RRR, S1, S2, no murmurs, rubs, or gallops; no edema; no JVD  Respiratory: Clear to auscultation bilaterally, no rales  Gastrointestinal: soft, non-tender, non-distended with normal bowel sounds  Musculoskeletal: No clubbing; no joint deformity   Neurologic: Non-focal  Lymphatic: No lymphadenopathy  Psychiatry: AAOx3, mood & affect appropriate  Skin: No rashes, ecchymoses, or cyanosis                          14.8   6.7   )-----------( 243      ( 01 May 2018 05:46 )             43.5     05-01    142  |  103  |  20  ----------------------------<  98  4.0   |  24  |  0.79    Ca    10.1      01 May 2018 05:46  Phos  4.6     05-01  Mg     2.4     05-01    TPro  6.0  /  Alb  3.5  /  TBili  0.6  /  DBili  x   /  AST  38  /  ALT  35  /  AlkPhos  47  04-30    PT/INR - ( 30 Apr 2018 04:32 )   PT: 12.5 sec;   INR: 1.15 ratio    PTT - ( 30 Apr 2018 04:32 )  PTT:28.8 sec    Serum Pro-Brain Natriuretic Peptide: 3147 pg/mL (04-28 @ 15:47)  Serum Pro-Brain Natriuretic Peptide: 1758 pg/mL (04-28 @ 12:10)    Echo: < from: TTE with Doppler (w/Cont) (04.28.18 @ 12:48) >  Dimensions:    Normal Values:  LA:     3.9    2.0 - 4.0 cm  Ao:     2.4    2.0 - 3.8 cm  SEPTUM: 0.8    0.6 - 1.2 cm  PWT:    0.8    0.6 - 1.1 cm  LVIDd:  4.8    3.0 - 5.6 cm  LVIDs:  3.5    1.8 - 4.0 cm  Derived variables:  LVMI: 59 g/m2  RWT: 0.33  Fractional short: 27 %  EF (Visual Estimate): 25 %  ------------------------------------------------------------------------  Observations: Mitral Valve: Normal mitral valve. Mild mitral regurgitation. Aortic Valve/Aorta: Normal trileaflet aortic valve. LVOT velocity time integral equals 17 cm. Aortic Root: 2.4 cm. LVOT diameter: 1.8 cm. Left Atrium: Mildly dilated left atrium.  LA volume index = 37 cc/m2. Left Ventricle: Severe segmental left ventricular systolic  dysfunction.  Akinesis of the mid to apical septum, apex, distal lateral, inferolateral wall.   Endocardial visualization enhanced with intravenous injection of echo contrast (Definity). No left ventricular thrombus. Normal left ventricular internal dimensions and wall thickness. Moderate diastolic dysfunction (Stage II). Right Heart: Normal right atrium.   Normal tricuspid valve. Normal pulmonic valve. Pericardium/Pleura: Normal pericardium with no pericardial effusion. Hemodynamic: Estimated right atrial pressure is 8 mm Hg. Estimated right ventricular systolic pressure equals 31 mm Hg, assuming right atrial pressure equals 8 mm Hg, consistent with normal pulmonary pressures.  < end of copied text >    Cath: < from: Cardiac Cath Lab - Adult (04.28.18 @ 17:31) > PROCEDURE: --  Sonosite - Diagnostic. --  [place] Alondra Insertion. --  Right heart catheterization. --  Left coronary angiography. --  Right coronary angiography. --  Left heart catheterization with ventriculography. --  Hemostasis with Perclose.  VENTRICLES: EF estimated was 25 %. Relatively preserved function at the base. CORONARY VESSELS: The coronary circulation is co-dominant. LM:   --  LM: Normal. LAD:   --  LAD: Normal. CX:   --  Circumflex: Normal. RCA:   --  RCA: Normal. COMPLICATIONS: There were no complications.  < end of copied text >    Imaging: < from: CT Chest No Cont (04.29.18 @ 15:47) > Diffuse bilateral patchy ground glass opacities. Differential diagnosis  includes pulmonary edema and/or multifocal pneumonia.  < end of copied text >     Interpretation of Telemetry: Sinus 60s - 80s Patient seen and examined at bedside on 2 DSU    Overnight Events: The patient was transferred out of the CCU this morning. ABxs were stopped in the CCU    Review Of Systems: No chest pain, shortness of breath, or palpitations. He reports that he is afraid of the MRI machine and will not have the test done this admission. He would like to be discharged today     Current Meds:  aspirin enteric coated 81 milliGRAM(s) Oral daily  buprenorphine 8 mG/naloxone 2 mG SL  Tablet 1 Tablet(s) SubLingual daily  furosemide    Tablet 20 milliGRAM(s) Oral daily  heparin  Injectable 5000 Unit(s) SubCutaneous every 8 hours  influenza   Vaccine 0.5 milliLiter(s) IntraMuscular once  metoprolol tartrate 12.5 milliGRAM(s) Oral two times a day    Vitals:  T(F): 98.3 (05-01), Max: 98.6 (04-30)  HR: 66 (05-01) (60 - 76)  BP: 96/61 (05-01) (93/66 - 117/72)  RR: 16 (05-01)  SpO2: 95% on RA    I&O's Summary    30 Apr 2018 07:01  -  01 May 2018 07:00  --------------------------------------------------------  IN: 320 mL / OUT: 1140 mL / NET: -820 mL    Physical Exam:  Appearance: No acute distress; well appearing, walking around the room  Eyes: PERRL, EOMI, pink conjunctiva  HENT: Normal oral mucosa  Cardiovascular: RRR, S1, S2, S3, no murmurs, rubs, no edema; no JVD  Respiratory: Clear to auscultation bilaterally, no rales  Gastrointestinal: soft, non-tender, non-distended with normal bowel sounds  Musculoskeletal: No clubbing; no joint deformity   Neurologic: Non-focal  Lymphatic: No lymphadenopathy  Psychiatry: AAOx3, mood & affect appropriate  Skin: No rashes, ecchymoses, or cyanosis                          14.8   6.7   )-----------( 243      ( 01 May 2018 05:46 )             43.5     05-01    142  |  103  |  20  ----------------------------<  98  4.0   |  24  |  0.79    Ca    10.1      01 May 2018 05:46  Phos  4.6     05-01  Mg     2.4     05-01    TPro  6.0  /  Alb  3.5  /  TBili  0.6  /  DBili  x   /  AST  38  /  ALT  35  /  AlkPhos  47  04-30    PT/INR - ( 30 Apr 2018 04:32 )   PT: 12.5 sec;   INR: 1.15 ratio    PTT - ( 30 Apr 2018 04:32 )  PTT:28.8 sec    Serum Pro-Brain Natriuretic Peptide: 3147 pg/mL (04-28 @ 15:47)  Serum Pro-Brain Natriuretic Peptide: 1758 pg/mL (04-28 @ 12:10)    Echo: < from: TTE with Doppler (w/Cont) (04.28.18 @ 12:48) >  Dimensions:    Normal Values:  LA:     3.9    2.0 - 4.0 cm  Ao:     2.4    2.0 - 3.8 cm  SEPTUM: 0.8    0.6 - 1.2 cm  PWT:    0.8    0.6 - 1.1 cm  LVIDd:  4.8    3.0 - 5.6 cm  LVIDs:  3.5    1.8 - 4.0 cm  Derived variables:  LVMI: 59 g/m2  RWT: 0.33  Fractional short: 27 %  EF (Visual Estimate): 25 %  ------------------------------------------------------------------------  Observations: Mitral Valve: Normal mitral valve. Mild mitral regurgitation. Aortic Valve/Aorta: Normal trileaflet aortic valve. LVOT velocity time integral equals 17 cm. Aortic Root: 2.4 cm. LVOT diameter: 1.8 cm. Left Atrium: Mildly dilated left atrium.  LA volume index = 37 cc/m2. Left Ventricle: Severe segmental left ventricular systolic  dysfunction.  Akinesis of the mid to apical septum, apex, distal lateral, inferolateral wall.   Endocardial visualization enhanced with intravenous injection of echo contrast (Definity). No left ventricular thrombus. Normal left ventricular internal dimensions and wall thickness. Moderate diastolic dysfunction (Stage II). Right Heart: Normal right atrium.   Normal tricuspid valve. Normal pulmonic valve. Pericardium/Pleura: Normal pericardium with no pericardial effusion. Hemodynamic: Estimated right atrial pressure is 8 mm Hg. Estimated right ventricular systolic pressure equals 31 mm Hg, assuming right atrial pressure equals 8 mm Hg, consistent with normal pulmonary pressures.  < end of copied text >    Cath: < from: Cardiac Cath Lab - Adult (04.28.18 @ 17:31) > PROCEDURE: --  Sonosite - Diagnostic. --  [place] Alondra Insertion. --  Right heart catheterization. --  Left coronary angiography. --  Right coronary angiography. --  Left heart catheterization with ventriculography. --  Hemostasis with Perclose.  VENTRICLES: EF estimated was 25 %. Relatively preserved function at the base. CORONARY VESSELS: The coronary circulation is co-dominant. LM:   --  LM: Normal. LAD:   --  LAD: Normal. CX:   --  Circumflex: Normal. RCA:   --  RCA: Normal. COMPLICATIONS: There were no complications.  < end of copied text >    Imaging: < from: CT Chest No Cont (04.29.18 @ 15:47) > Diffuse bilateral patchy ground glass opacities. Differential diagnosis  includes pulmonary edema and/or multifocal pneumonia.  < end of copied text >     Interpretation of Telemetry: Sinus 60s - 80s

## 2018-05-01 NOTE — PROGRESS NOTE ADULT - ASSESSMENT
38M w/ no PMHx (prior hospitalization 11/2017 for PNA), Opiate abuse (on Suboxone) who presented to an OSH w/ CP and SOB and was Tx to [facility] w/ +biomarkers, TW changes (concerning for hyperacute TWs although unchanged on subsequent ECGs). The patient was found to be in acute HFrEF (EF:25%, +RVSD w/ hypokinesis of the mid-apical septum, apex, distal lateral and inferolateral walls). His LHC did not show evidence of CAD. The patient's Utox at [facility] confirms cocaine use (which pt is still denying). Suspect stress CM 2/2 cocaine use vs myocarditis. The patient is clinically euvolemic on exam and is refusing cMR.     Plan:    1. Acute HFrEF  -Repeat limited TTE today w/ Definity to r/o LV thrombus (day 4 of hospitalization)  -c/w Lasix 20 daily  -cMR if patient amendable  -Monitor I/Os, daily weights  -Change Lopressor 12.5 bid to Toprol XL 25  -D/C ASA 81  -Start Lisinopril 2.5 daily  -Monitor on Tele    If repeat TTE is negative for LV thrombus and pt continues to refuse cMR and he is tolerating the above medication changes no further Cardiac w/u as an inpatient. The patient reports that he wants to f/u as an outpatient with a Cardiologist from [place] (Dr. Brandin Qureshi [phone]). Would also provide him with the General Cardiology Clinic number in his discharge paperwork as he needs close outpatient f/u ([phone])    General Cardiology will continue to follow if the patient remains admitted    JEFFERSON Pruitt  Cardiology Fellow  (p): [phone] 38M w/ no PMHx (prior hospitalization 11/2017 for PNA), Opiate abuse (on Suboxone) who presented to an OSH w/ CP and SOB and was Tx to [facility] w/ +biomarkers, TW changes (concerning for hyperacute TWs although unchanged on subsequent ECGs). The patient was found to be in acute HFrEF (EF:25%, +RVSD w/ hypokinesis of the mid-apical septum, apex, distal lateral and inferolateral walls). His LHC did not show evidence of CAD. The patient's Utox at [facility] confirms cocaine use (which pt is still denying). Suspect stress CM 2/2 cocaine use vs myocarditis. The patient is clinically euvolemic on exam and is refusing cMR.     Plan:    1. Acute HFrEF  -Repeat limited TTE today w/ Definity to r/o LV thrombus (day 4 of hospitalization)  -c/w Lasix 20 daily  -cMR if patient amendable  -Monitor I/Os, daily weights  -Change Lopressor 12.5 bid to Toprol XL 25  -D/C ASA 81  -Start Lisinopril 2.5 daily  -Monitor on Tele    If repeat TTE is negative for LV thrombus and pt continues to refuse cMR and he is tolerating the above medication changes no further Cardiac w/u as an inpatient. The patient reports that he wants to f/u as an outpatient with a Cardiologist from [place] (Dr. Brandin Qureshi [phone]). Would also provide him with the General Cardiology Clinic number in his discharge paperwork as he needs close outpatient f/u ([phone])    I took the liberty of changing these orders myself. I spoke with the nurse, the floor NP and the Echo department. General Cardiology will continue to follow if the patient remains admitted    JEFFERSON Pruitt  Cardiology Fellow  (p): [phone] 38M w/ no PMHx (prior hospitalization 11/2017 for PNA), Opiate abuse (on Suboxone) who presented to an OSH w/ CP and SOB and was Tx to [facility] w/ +biomarkers, TW changes (concerning for hyperacute TWs although unchanged on subsequent ECGs). The patient was found to be in acute HFrEF (EF:25%, +RVSD w/ hypokinesis of the mid-apical septum, apex, distal lateral and inferolateral walls). His LHC did not show evidence of CAD. The patient's Utox at [facility] confirms cocaine use (which pt is still denying). Suspect stress CM 2/2 cocaine use vs myocarditis. The patient is clinically euvolemic on exam and is refusing cMR.     Plan:    1. Acute HFrEF  -Repeat limited TTE today w/ Definity to r/o LV thrombus (day 4 of hospitalization)  -c/w Lasix 20 daily  -cMR if patient amendable  -Monitor I/Os, daily weights  -Change Lopressor 12.5 bid to Toprol XL 25  -c/w ASA 81 - cocaine use known to cause microvascular disease that would not be visualized on LHC  -Start Lisinopril 2.5 daily  -Monitor on Tele    If repeat TTE is negative for LV thrombus and pt continues to refuse cMR and he is tolerating the above medication changes no further Cardiac w/u as an inpatient. The patient reports that he wants to f/u as an outpatient with a Cardiologist from [place] (Dr. Brandin Munizr [phone]). Would also provide him with the General Cardiology Clinic number in his discharge paperwork as he needs close outpatient f/u ([phone])    I took the liberty of changing these orders myself. I spoke with the nurse, the floor NP and the Echo department as well as the CCU team (the patient was transferred out of the CCU this morning prior to am rounds). General Cardiology will continue to follow if the patient remains admitted    JEFFERSON Pruitt  Cardiology Fellow  (p): [phone]

## 2018-05-04 LAB
AMPHET UR-MCNC: NEGATIVE — SIGNIFICANT CHANGE UP
BARBITURATES, URINE.: NEGATIVE — SIGNIFICANT CHANGE UP
BENZODIAZ UR-MCNC: NEGATIVE — SIGNIFICANT CHANGE UP
COCAINE METAB.OTHER UR-MCNC: SIGNIFICANT CHANGE UP
CREATININE, URINE THERAPEUTIC: 96.3 MG/DL — SIGNIFICANT CHANGE UP
METHADONE UR-MCNC: NEGATIVE — SIGNIFICANT CHANGE UP
METHAQUALONE UR QL: NEGATIVE — SIGNIFICANT CHANGE UP
METHAQUALONE UR-MCNC: NEGATIVE — SIGNIFICANT CHANGE UP
OPIATES UR-MCNC: NEGATIVE — SIGNIFICANT CHANGE UP
PCP UR-MCNC: NEGATIVE — SIGNIFICANT CHANGE UP
PROPOXYPH UR QL: NEGATIVE — SIGNIFICANT CHANGE UP
THC UR QL: NEGATIVE — SIGNIFICANT CHANGE UP

## 2018-05-08 ENCOUNTER — APPOINTMENT (OUTPATIENT)
Dept: CARDIOLOGY | Facility: CLINIC | Age: 38
End: 2018-05-08
Payer: MEDICAID

## 2018-05-08 ENCOUNTER — NON-APPOINTMENT (OUTPATIENT)
Age: 38
End: 2018-05-08

## 2018-05-08 VITALS
DIASTOLIC BLOOD PRESSURE: 78 MMHG | HEART RATE: 60 BPM | HEIGHT: 73 IN | WEIGHT: 195 LBS | SYSTOLIC BLOOD PRESSURE: 118 MMHG | OXYGEN SATURATION: 100 % | BODY MASS INDEX: 25.84 KG/M2

## 2018-05-08 PROCEDURE — 99215 OFFICE O/P EST HI 40 MIN: CPT

## 2018-05-08 PROCEDURE — 99205 OFFICE O/P NEW HI 60 MIN: CPT

## 2018-05-08 PROCEDURE — 93000 ELECTROCARDIOGRAM COMPLETE: CPT

## 2018-05-23 PROCEDURE — 87486 CHLMYD PNEUM DNA AMP PROBE: CPT

## 2018-05-23 PROCEDURE — 87086 URINE CULTURE/COLONY COUNT: CPT

## 2018-05-23 PROCEDURE — 85014 HEMATOCRIT: CPT

## 2018-05-23 PROCEDURE — 71250 CT THORAX DX C-: CPT

## 2018-05-23 PROCEDURE — 82947 ASSAY GLUCOSE BLOOD QUANT: CPT

## 2018-05-23 PROCEDURE — 85610 PROTHROMBIN TIME: CPT

## 2018-05-23 PROCEDURE — C8929: CPT

## 2018-05-23 PROCEDURE — 93460 R&L HRT ART/VENTRICLE ANGIO: CPT

## 2018-05-23 PROCEDURE — 86901 BLOOD TYPING SEROLOGIC RH(D): CPT

## 2018-05-23 PROCEDURE — 81001 URINALYSIS AUTO W/SCOPE: CPT

## 2018-05-23 PROCEDURE — 84484 ASSAY OF TROPONIN QUANT: CPT

## 2018-05-23 PROCEDURE — C1887: CPT

## 2018-05-23 PROCEDURE — 84132 ASSAY OF SERUM POTASSIUM: CPT

## 2018-05-23 PROCEDURE — 84100 ASSAY OF PHOSPHORUS: CPT

## 2018-05-23 PROCEDURE — 87040 BLOOD CULTURE FOR BACTERIA: CPT

## 2018-05-23 PROCEDURE — 83605 ASSAY OF LACTIC ACID: CPT

## 2018-05-23 PROCEDURE — 86850 RBC ANTIBODY SCREEN: CPT

## 2018-05-23 PROCEDURE — 82550 ASSAY OF CK (CPK): CPT

## 2018-05-23 PROCEDURE — 80053 COMPREHEN METABOLIC PANEL: CPT

## 2018-05-23 PROCEDURE — 80307 DRUG TEST PRSMV CHEM ANLYZR: CPT

## 2018-05-23 PROCEDURE — 96374 THER/PROPH/DIAG INJ IV PUSH: CPT

## 2018-05-23 PROCEDURE — 85730 THROMBOPLASTIN TIME PARTIAL: CPT

## 2018-05-23 PROCEDURE — C1760: CPT

## 2018-05-23 PROCEDURE — 87581 M.PNEUMON DNA AMP PROBE: CPT

## 2018-05-23 PROCEDURE — 96375 TX/PRO/DX INJ NEW DRUG ADDON: CPT

## 2018-05-23 PROCEDURE — 82803 BLOOD GASES ANY COMBINATION: CPT

## 2018-05-23 PROCEDURE — 87633 RESP VIRUS 12-25 TARGETS: CPT

## 2018-05-23 PROCEDURE — 80048 BASIC METABOLIC PNL TOTAL CA: CPT

## 2018-05-23 PROCEDURE — 84295 ASSAY OF SERUM SODIUM: CPT

## 2018-05-23 PROCEDURE — 82435 ASSAY OF BLOOD CHLORIDE: CPT

## 2018-05-23 PROCEDURE — 93005 ELECTROCARDIOGRAM TRACING: CPT

## 2018-05-23 PROCEDURE — 80061 LIPID PANEL: CPT

## 2018-05-23 PROCEDURE — 99285 EMERGENCY DEPT VISIT HI MDM: CPT | Mod: 25

## 2018-05-23 PROCEDURE — 80202 ASSAY OF VANCOMYCIN: CPT

## 2018-05-23 PROCEDURE — 84443 ASSAY THYROID STIM HORMONE: CPT

## 2018-05-23 PROCEDURE — 87798 DETECT AGENT NOS DNA AMP: CPT

## 2018-05-23 PROCEDURE — 83880 ASSAY OF NATRIURETIC PEPTIDE: CPT

## 2018-05-23 PROCEDURE — 93308 TTE F-UP OR LMTD: CPT

## 2018-05-23 PROCEDURE — 85027 COMPLETE CBC AUTOMATED: CPT

## 2018-05-23 PROCEDURE — 99153 MOD SED SAME PHYS/QHP EA: CPT

## 2018-05-23 PROCEDURE — 71045 X-RAY EXAM CHEST 1 VIEW: CPT

## 2018-05-23 PROCEDURE — 99152 MOD SED SAME PHYS/QHP 5/>YRS: CPT

## 2018-05-23 PROCEDURE — C1769: CPT

## 2018-05-23 PROCEDURE — 83036 HEMOGLOBIN GLYCOSYLATED A1C: CPT

## 2018-05-23 PROCEDURE — 84145 PROCALCITONIN (PCT): CPT

## 2018-05-23 PROCEDURE — 82553 CREATINE MB FRACTION: CPT

## 2018-05-23 PROCEDURE — 86703 HIV-1/HIV-2 1 RESULT ANTBDY: CPT

## 2018-05-23 PROCEDURE — 83735 ASSAY OF MAGNESIUM: CPT

## 2018-05-23 PROCEDURE — 82330 ASSAY OF CALCIUM: CPT

## 2018-05-23 PROCEDURE — C1894: CPT

## 2018-05-23 PROCEDURE — 86900 BLOOD TYPING SEROLOGIC ABO: CPT

## 2018-08-06 PROBLEM — E05.90 THYROTOXICOSIS, UNSPECIFIED WITHOUT THYROTOXIC CRISIS OR STORM: Chronic | Status: ACTIVE | Noted: 2017-04-29

## 2018-08-07 ENCOUNTER — OUTPATIENT (OUTPATIENT)
Dept: OUTPATIENT SERVICES | Facility: HOSPITAL | Age: 38
LOS: 1 days | End: 2018-08-07
Payer: MEDICAID

## 2018-08-07 ENCOUNTER — APPOINTMENT (OUTPATIENT)
Dept: CARDIOLOGY | Facility: CLINIC | Age: 38
End: 2018-08-07
Payer: MEDICAID

## 2018-08-07 ENCOUNTER — APPOINTMENT (OUTPATIENT)
Dept: CV DIAGNOSITCS | Facility: HOSPITAL | Age: 38
End: 2018-08-07

## 2018-08-07 ENCOUNTER — NON-APPOINTMENT (OUTPATIENT)
Age: 38
End: 2018-08-07

## 2018-08-07 VITALS
DIASTOLIC BLOOD PRESSURE: 85 MMHG | OXYGEN SATURATION: 98 % | HEART RATE: 54 BPM | SYSTOLIC BLOOD PRESSURE: 131 MMHG | WEIGHT: 208 LBS | BODY MASS INDEX: 27.57 KG/M2 | HEIGHT: 73 IN

## 2018-08-07 DIAGNOSIS — Z98.890 OTHER SPECIFIED POSTPROCEDURAL STATES: Chronic | ICD-10-CM

## 2018-08-07 DIAGNOSIS — I42.8 OTHER CARDIOMYOPATHIES: ICD-10-CM

## 2018-08-07 PROCEDURE — 93306 TTE W/DOPPLER COMPLETE: CPT | Mod: 26

## 2018-08-07 PROCEDURE — 93306 TTE W/DOPPLER COMPLETE: CPT

## 2018-08-07 PROCEDURE — 99214 OFFICE O/P EST MOD 30 MIN: CPT

## 2018-08-07 RX ORDER — FUROSEMIDE 20 MG/1
20 TABLET ORAL DAILY
Qty: 90 | Refills: 3 | Status: DISCONTINUED | COMMUNITY
Start: 2018-05-08 | End: 2018-08-07

## 2018-08-08 ENCOUNTER — RX RENEWAL (OUTPATIENT)
Age: 38
End: 2018-08-08

## 2018-12-10 ENCOUNTER — RX RENEWAL (OUTPATIENT)
Age: 38
End: 2018-12-10

## 2019-01-04 ENCOUNTER — EMERGENCY (EMERGENCY)
Facility: HOSPITAL | Age: 39
LOS: 1 days | Discharge: HOME | End: 2019-01-04
Attending: EMERGENCY MEDICINE
Payer: COMMERCIAL

## 2019-01-04 VITALS
OXYGEN SATURATION: 99 % | TEMPERATURE: 98 F | HEART RATE: 64 BPM | RESPIRATION RATE: 18 BRPM | HEIGHT: 73 IN | DIASTOLIC BLOOD PRESSURE: 90 MMHG | SYSTOLIC BLOOD PRESSURE: 140 MMHG | WEIGHT: 210.1 LBS

## 2019-01-04 DIAGNOSIS — Z98.890 OTHER SPECIFIED POSTPROCEDURAL STATES: Chronic | ICD-10-CM

## 2019-01-04 PROCEDURE — 73050 X-RAY EXAM OF SHOULDERS: CPT | Mod: 26

## 2019-01-04 PROCEDURE — 96372 THER/PROPH/DIAG INJ SC/IM: CPT

## 2019-01-04 PROCEDURE — 99284 EMERGENCY DEPT VISIT MOD MDM: CPT | Mod: 25

## 2019-01-04 PROCEDURE — 73030 X-RAY EXAM OF SHOULDER: CPT

## 2019-01-04 PROCEDURE — 99283 EMERGENCY DEPT VISIT LOW MDM: CPT

## 2019-01-04 PROCEDURE — 73050 X-RAY EXAM OF SHOULDERS: CPT

## 2019-01-04 PROCEDURE — 73030 X-RAY EXAM OF SHOULDER: CPT | Mod: 26,RT

## 2019-01-04 RX ORDER — KETOROLAC TROMETHAMINE 30 MG/ML
30 SYRINGE (ML) INJECTION ONCE
Qty: 0 | Refills: 0 | Status: DISCONTINUED | OUTPATIENT
Start: 2019-01-04 | End: 2019-01-04

## 2019-01-04 RX ORDER — DIAZEPAM 5 MG
5 TABLET ORAL ONCE
Qty: 0 | Refills: 0 | Status: DISCONTINUED | OUTPATIENT
Start: 2019-01-04 | End: 2019-01-04

## 2019-01-04 RX ORDER — LIDOCAINE 4 G/100G
1 CREAM TOPICAL ONCE
Qty: 0 | Refills: 0 | Status: COMPLETED | OUTPATIENT
Start: 2019-01-04 | End: 2019-01-04

## 2019-01-04 RX ORDER — OXYCODONE AND ACETAMINOPHEN 5; 325 MG/1; MG/1
1 TABLET ORAL ONCE
Qty: 0 | Refills: 0 | Status: DISCONTINUED | OUTPATIENT
Start: 2019-01-04 | End: 2019-01-04

## 2019-01-04 RX ADMIN — Medication 5 MILLIGRAM(S): at 16:32

## 2019-01-04 RX ADMIN — Medication 30 MILLIGRAM(S): at 16:04

## 2019-01-04 RX ADMIN — OXYCODONE AND ACETAMINOPHEN 1 TABLET(S): 5; 325 TABLET ORAL at 16:32

## 2019-01-04 RX ADMIN — LIDOCAINE 1 PATCH: 4 CREAM TOPICAL at 16:32

## 2019-01-04 NOTE — ED PROVIDER NOTE - NSFOLLOWUPINSTRUCTIONS_ED_ALL_ED_FT
Rest. Hydrate. Elevate affected site. You may use the sling as needed for up to 7 days. Be sure to remove sling 4 times daily and move your arm around.  Follow up with Orthopedics. Call for appointment tomorrow.  For pain Take motrin 600mg by mouth every 8 hours as needed for pain. Take with food. You may also continue Tylenol prescribed to you by your PCP.  Return to ER for new or worsening symptoms including severe pain, numbness, tingling, weakness, swelling, or any concern.

## 2019-01-04 NOTE — ED PROVIDER NOTE - PROGRESS NOTE DETAILS
XRs negative. Provided sling for comfort. Educated on removing sling 4 times daily to rotate shoulder. Plan to continue tylenol#3 he was previously prescribed plus ibuprofen. Follow up with Orthopedics. -Yaa Huynh PA-C

## 2019-01-04 NOTE — ED PROVIDER NOTE - CARE PLAN
Principal Discharge DX:	Acute pain of right shoulder  Assessment and plan of treatment:	Right shoulder pain w radiculopathy  -XR shoulder and b/l AC  -analgesia prn  -ortho f/u

## 2019-01-04 NOTE — ED ADULT NURSE NOTE - NSIMPLEMENTINTERV_GEN_ALL_ED
Implemented All Universal Safety Interventions:  [place] to call system. Call bell, personal items and telephone within reach. Instruct patient to call for assistance. Room bathroom lighting operational. Non-slip footwear when patient is off stretcher. Physically safe environment: no spills, clutter or unnecessary equipment. Stretcher in lowest position, wheels locked, appropriate side rails in place.

## 2019-01-04 NOTE — ED PROVIDER NOTE - NSFOLLOWUPCLINICS_GEN_ALL_ED_FT
[facility] Orthopedic Surgery  Orthopedic Surgery  [address], [address]rd & [address]th [place] [address]  Phone: [phone]  Fax:   Follow Up Time:

## 2019-01-04 NOTE — ED ADULT NURSE NOTE - OBJECTIVE STATEMENT
37 yo male A&OX3 presents to the ED with the c/o r sided shoulder pain. Pt states that he started having pain on Wed and was seen by him pmd who put him on Tylenol 3 and Flexeril, pt states that pain is now worst and that the medication does not help relieve the pain. 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 MAEX4

## 2019-01-04 NOTE — ED PROVIDER NOTE - OBJECTIVE STATEMENT
37yo M pmh LV dysfunction on carvedilol, lasix, asa, RUE carpal tunnel and ulnar nerve radiculopathy s/p release presents for evaluation of right shoulder pain s/p lifting heavy pipe 1/2/18. Acute onset in anterior shoulder. Radiating to trapezius. Associated w numbness/tingling to all digits of right hand. No chest pain, sob. Nonpleuritic. No neck/back pain. Left hand dominant.

## 2019-01-04 NOTE — ED PROVIDER NOTE - MEDICAL DECISION MAKING DETAILS
Attending MD Woodard: 38M with R shoulder and neck pain, exam with reproducible ttp right trapezius and R AC joint, ddx includes cervical radiculopathy, shoulder arthritis, impingement, rotator cuff arthropathy. Plan for screening XR R shoulder, PO analgesia and reassessment

## 2019-01-04 NOTE — ED PROVIDER NOTE - MUSCULOSKELETAL, MLM
No vertebral point tenderness to palpation. No stepoffs. No deformities. Right shoulder with slightly asymmetric contour to left, however patient is angelo trapezius shrugging shoulder constantly. +Anterior shoulder ttp. +trapezius ttp. Decreased passive/active ROM limiited by pain. Radial nerve 2+. FROM at elbow/wrist/hand. Motor intact. Decreased sensation to all digits.

## 2019-01-04 NOTE — ED PROVIDER NOTE - NEUROLOGICAL, MLM
Alert and oriented, no focal deficits, no motor deficits. Abnormal sensation to right hand as above.

## 2019-01-22 ENCOUNTER — MEDICATION RENEWAL (OUTPATIENT)
Age: 39
End: 2019-01-22

## 2019-01-30 PROBLEM — I51.9 HEART DISEASE, UNSPECIFIED: Chronic | Status: ACTIVE | Noted: 2019-01-04

## 2019-02-04 ENCOUNTER — APPOINTMENT (OUTPATIENT)
Dept: CARDIOLOGY | Facility: CLINIC | Age: 39
End: 2019-02-04

## 2019-02-05 ENCOUNTER — APPOINTMENT (OUTPATIENT)
Dept: CARDIOLOGY | Facility: CLINIC | Age: 39
End: 2019-02-05

## 2019-03-18 ENCOUNTER — RX RENEWAL (OUTPATIENT)
Age: 39
End: 2019-03-18

## 2019-05-30 NOTE — ED ADULT NURSE NOTE - CARDIO WDL
Wear protective goggles/shield to bed. Normal rate, regular rhythm, normal S1, S2 heart sounds heard.

## 2019-11-12 ENCOUNTER — NON-APPOINTMENT (OUTPATIENT)
Age: 39
End: 2019-11-12

## 2019-11-12 ENCOUNTER — APPOINTMENT (OUTPATIENT)
Dept: CARDIOLOGY | Facility: CLINIC | Age: 39
End: 2019-11-12
Payer: COMMERCIAL

## 2019-11-12 VITALS
SYSTOLIC BLOOD PRESSURE: 121 MMHG | WEIGHT: 210 LBS | DIASTOLIC BLOOD PRESSURE: 85 MMHG | OXYGEN SATURATION: 96 % | HEART RATE: 62 BPM | BODY MASS INDEX: 27.83 KG/M2 | HEIGHT: 73 IN

## 2019-11-12 DIAGNOSIS — I50.22 CHRONIC SYSTOLIC (CONGESTIVE) HEART FAILURE: ICD-10-CM

## 2019-11-12 DIAGNOSIS — I42.8 OTHER CARDIOMYOPATHIES: ICD-10-CM

## 2019-11-12 PROCEDURE — 99214 OFFICE O/P EST MOD 30 MIN: CPT

## 2019-11-12 PROCEDURE — 93000 ELECTROCARDIOGRAM COMPLETE: CPT

## 2019-11-12 NOTE — DISCUSSION/SUMMARY
[___ Month(s)] : [unfilled] month(s) [FreeTextEntry1] : The patient is a 39-year-old gentleman  nonischemic cardiomyopathy after viral illness who has been off all medications and anxious to exercise. ECG normal. Negative exam. Exercise stress ECHO ordered. Compliance with follow up discussed.

## 2019-11-12 NOTE — PHYSICAL EXAM
[General Appearance - Well Developed] : well developed [Normal Appearance] : normal appearance [Well Groomed] : well groomed [General Appearance - Well Nourished] : well nourished [General Appearance - In No Acute Distress] : no acute distress [No Deformities] : no deformities [Normal Conjunctiva] : the conjunctiva exhibited no abnormalities [Eyelids - No Xanthelasma] : the eyelids demonstrated no xanthelasmas [Normal Oral Mucosa] : normal oral mucosa [No Oral Pallor] : no oral pallor [Normal Jugular Venous A Waves Present] : normal jugular venous A waves present [No Oral Cyanosis] : no oral cyanosis [Normal Jugular Venous V Waves Present] : normal jugular venous V waves present [No Jugular Venous Jaquez A Waves] : no jugular venous jaquez A waves [Respiration, Rhythm And Depth] : normal respiratory rhythm and effort [Auscultation Breath Sounds / Voice Sounds] : lungs were clear to auscultation bilaterally [Exaggerated Use Of Accessory Muscles For Inspiration] : no accessory muscle use [Heart Rate And Rhythm] : heart rate and rhythm were normal [Heart Sounds] : normal S1 and S2 [Murmurs] : no murmurs present [Abdomen Tenderness] : non-tender [Abdomen Mass (___ Cm)] : no abdominal mass palpated [Abdomen Soft] : soft [Gait - Sufficient For Exercise Testing] : the gait was sufficient for exercise testing [Abnormal Walk] : normal gait [Petechial Hemorrhages (___cm)] : no petechial hemorrhages [Cyanosis, Localized] : no localized cyanosis [Nail Clubbing] : no clubbing of the fingernails [Skin Color & Pigmentation] : normal skin color and pigmentation [] : no rash [No Venous Stasis] : no venous stasis [Skin Lesions] : no skin lesions [No Skin Ulcers] : no skin ulcer [No Xanthoma] : no  xanthoma was observed [Oriented To Time, Place, And Person] : oriented to person, place, and time [Affect] : the affect was normal [No Anxiety] : not feeling anxious [Mood] : the mood was normal

## 2019-11-12 NOTE — HISTORY OF PRESENT ILLNESS
[FreeTextEntry1] : Santino is a 39-year-old gentleman nonischemic CMP who is feeling well. Denies any chest pain, palpitations or shortness of breath. He stopped medications when bottles ran out. Went to PCP and referred here.

## 2019-11-15 RX ORDER — BUPRENORPHINE HYDROCHLORIDE, NALOXONE HYDROCHLORIDE 2; .5 MG/1; MG/1
2-0.5 FILM, SOLUBLE BUCCAL; SUBLINGUAL
Refills: 0 | Status: ACTIVE | COMMUNITY

## 2019-12-10 ENCOUNTER — APPOINTMENT (OUTPATIENT)
Dept: CARDIOLOGY | Facility: CLINIC | Age: 39
End: 2019-12-10

## 2019-12-31 ENCOUNTER — APPOINTMENT (OUTPATIENT)
Dept: CARDIOLOGY | Facility: CLINIC | Age: 39
End: 2019-12-31

## 2020-01-29 ENCOUNTER — APPOINTMENT (OUTPATIENT)
Dept: CV DIAGNOSITCS | Facility: HOSPITAL | Age: 40
End: 2020-01-29

## 2020-02-26 ENCOUNTER — APPOINTMENT (OUTPATIENT)
Dept: CV DIAGNOSITCS | Facility: HOSPITAL | Age: 40
End: 2020-02-26

## 2020-03-23 ENCOUNTER — APPOINTMENT (OUTPATIENT)
Dept: CV DIAGNOSITCS | Facility: HOSPITAL | Age: 40
End: 2020-03-23

## 2020-05-28 NOTE — DISCHARGE NOTE ADULT - HOSPITAL COURSE
Detail Level: Detailed 37 year old male with PMHX hyperthyroid, carpal tunnel, anxiety admitted with malaise, cough, fever, and sepsis secondary to Community acquired pneumonia. Stable for discharge on Levaquin x 5 days. 37 year old male with PMHX hyperthyroid, carpal tunnel, anxiety admitted with malaise, cough, fever, and sepsis secondary to Community acquired pneumonia. Stable for discharge on Levaquin x 5 days. Repeat chest xray outpatient in 4-6 weeks

## 2020-06-22 ENCOUNTER — APPOINTMENT (OUTPATIENT)
Dept: CARDIOLOGY | Facility: CLINIC | Age: 40
End: 2020-06-22

## 2020-07-21 ENCOUNTER — APPOINTMENT (OUTPATIENT)
Dept: CARDIOLOGY | Facility: CLINIC | Age: 40
End: 2020-07-21

## 2020-08-06 NOTE — DISCHARGE NOTE ADULT - SECONDARY DIAGNOSIS.
The pt called back and states he has been taking all of his medications as prescribed since being dc'd from the hospital. He states the HH RN organized all of his meds in a pill box organizer, but he could not verify all the BP meds he is taking. I am still waiting for a call back from the HH RN to verify. I informed the pt if his BP is still very high, he would possibly need to go back to the hospital. Pt states no headaches, no changes in vision and he does not have a BP machine to re-check it at this time. The pt also stated he is compliant with taking the Cymbalta and it is not helping with the depression at all. Please advise.   CAP (community acquired pneumonia) Transaminitis Anxiety

## 2020-08-28 NOTE — ED PROVIDER NOTE - PROGRESS NOTE
Quality 130: Documentation Of Current Medications In The Medical Record: Current Medications Documented Detail Level: Detailed Quality 431: Preventive Care And Screening: Unhealthy Alcohol Use - Screening: Patient screened for unhealthy alcohol use using a single question and scores less than 2 times per year Quality 226: Preventive Care And Screening: Tobacco Use: Screening And Cessation Intervention: Patient screened for tobacco use and is an ex/non-smoker Stable.

## 2020-10-27 ENCOUNTER — APPOINTMENT (OUTPATIENT)
Dept: CARDIOLOGY | Facility: CLINIC | Age: 40
End: 2020-10-27

## 2020-11-19 NOTE — CONSULT NOTE ADULT - CONSULT REQUESTED BY NAME
ED -stable  -can resuming low dose eliquis 2.5 mg daily as patient had afib on ppm interrogation.    Patient of Dr. Steven Goldberg ([facility])    Luis Kim D.O.  [phone]

## 2020-11-24 ENCOUNTER — APPOINTMENT (OUTPATIENT)
Dept: CARDIOLOGY | Facility: CLINIC | Age: 40
End: 2020-11-24

## 2020-11-24 ENCOUNTER — APPOINTMENT (OUTPATIENT)
Dept: CV DIAGNOSITCS | Facility: HOSPITAL | Age: 40
End: 2020-11-24

## 2021-03-16 ENCOUNTER — APPOINTMENT (OUTPATIENT)
Dept: CARDIOLOGY | Facility: CLINIC | Age: 41
End: 2021-03-16

## 2021-04-05 ENCOUNTER — APPOINTMENT (OUTPATIENT)
Dept: CARDIOLOGY | Facility: CLINIC | Age: 41
End: 2021-04-05

## 2021-10-11 ENCOUNTER — APPOINTMENT (OUTPATIENT)
Dept: CARDIOLOGY | Facility: CLINIC | Age: 41
End: 2021-10-11

## 2021-12-30 ENCOUNTER — APPOINTMENT (OUTPATIENT)
Dept: CARDIOLOGY | Facility: CLINIC | Age: 41
End: 2021-12-30

## 2022-03-21 NOTE — DISCHARGE NOTE ADULT - HAS THE PATIENT RECEIVED THE INFLUENZA VACCINE DURING THIS VISIT
No [Symptom and Test Evaluation] : symptom and test evaluation [Hypertension] : hypertension [Coronary Artery Disease] : coronary artery disease [Other: ____] : [unfilled]

## 2023-02-13 NOTE — ED ADULT NURSE NOTE - EENT WDL
Please schedule nurse visit in 2 weeks for blood pressure recheck.
Eyes with no visual disturbances.  Ears clean and dry and no hearing difficulties. Nose with pink mucosa and no drainage.  Mouth mucous membranes moist and pink.  No tenderness or swelling to throat or neck.

## 2023-11-07 ENCOUNTER — NON-APPOINTMENT (OUTPATIENT)
Age: 43
End: 2023-11-07

## 2023-11-16 ENCOUNTER — NON-APPOINTMENT (OUTPATIENT)
Age: 43
End: 2023-11-16

## 2023-11-24 ENCOUNTER — NON-APPOINTMENT (OUTPATIENT)
Age: 43
End: 2023-11-24

## 2023-12-27 ENCOUNTER — NON-APPOINTMENT (OUTPATIENT)
Age: 43
End: 2023-12-27

## 2024-01-03 ENCOUNTER — APPOINTMENT (OUTPATIENT)
Dept: ORTHOPEDIC SURGERY | Facility: CLINIC | Age: 44
End: 2024-01-03

## 2024-01-14 ENCOUNTER — NON-APPOINTMENT (OUTPATIENT)
Age: 44
End: 2024-01-14

## 2024-02-15 ENCOUNTER — NON-APPOINTMENT (OUTPATIENT)
Age: 44
End: 2024-02-15

## 2024-04-01 NOTE — DISCHARGE NOTE ADULT - FUNCTIONAL SCREEN CURRENT LEVEL: AMBULATION, MLM
Tolerated injection well.  Verified patient Rh Negative, verified Informed consent for RhoGAM injection and consent for treatment both signed, verified patient name, , MRN, and RhoGAM LOT# and EXP date. Patient was given RhoGAM medication information insert that came from blood bank with the injection. Highlighted reportable signs/symptoms and patient verbalizes understanding and denies any questions, offered education material and/or discharge material, reviewed medication information and signs and symptoms  and educated on possible side effects, verbalizes good knowledge of current plan patient verbalizes understanding, and has no signs or symptoms to report at this time. Patient discharged. Patient alert and oriented x3.   No distress noted.   Vital signs stable.   Patient denies any new or worsening pain.  Patient denies any needs.  All questions answered. Patient declined to wait observation period of 20 minutes after the injection instructed on importance of staying  and patient declines . No reaction noted. Patient given the filled out RhoGAM identification card and instructed on keeping with her,she verbalizes understanding.    (0) independent

## 2024-10-30 ENCOUNTER — NON-APPOINTMENT (OUTPATIENT)
Age: 44
End: 2024-10-30

## 2024-12-04 ENCOUNTER — NON-APPOINTMENT (OUTPATIENT)
Age: 44
End: 2024-12-04

## 2024-12-06 ENCOUNTER — NON-APPOINTMENT (OUTPATIENT)
Age: 44
End: 2024-12-06

## 2025-01-27 ENCOUNTER — NON-APPOINTMENT (OUTPATIENT)
Age: 45
End: 2025-01-27

## 2025-04-28 ENCOUNTER — NON-APPOINTMENT (OUTPATIENT)
Age: 45
End: 2025-04-28

## 2025-05-02 ENCOUNTER — NON-APPOINTMENT (OUTPATIENT)
Age: 45
End: 2025-05-02

## 2025-09-17 ENCOUNTER — NON-APPOINTMENT (OUTPATIENT)
Age: 45
End: 2025-09-17